# Patient Record
Sex: FEMALE | Race: WHITE | NOT HISPANIC OR LATINO | Employment: FULL TIME | URBAN - NONMETROPOLITAN AREA
[De-identification: names, ages, dates, MRNs, and addresses within clinical notes are randomized per-mention and may not be internally consistent; named-entity substitution may affect disease eponyms.]

---

## 2018-06-29 ENCOUNTER — HISTORICAL (OUTPATIENT)
Dept: ADMINISTRATIVE | Facility: HOSPITAL | Age: 23
End: 2018-06-29

## 2018-11-18 ENCOUNTER — HISTORICAL (OUTPATIENT)
Dept: ADMINISTRATIVE | Facility: HOSPITAL | Age: 23
End: 2018-11-18

## 2021-12-15 LAB
ALBUMIN SERPL-MCNC: 4.2 G/DL (ref 3.4–5)
ALBUMIN/GLOB SERPL: 1.8 {RATIO}
ALP SERPL-CCNC: 72 U/L (ref 50–144)
ALT SERPL-CCNC: 14 U/L (ref 1–45)
ANION GAP SERPL CALC-SCNC: 3 MMOL/L (ref 2–13)
AST SERPL-CCNC: 18 U/L (ref 14–36)
BASOPHILS # BLD AUTO: 0.07 10*3/UL (ref 0.01–0.08)
BASOPHILS NFR BLD AUTO: 1 % (ref 0.1–1.2)
BILIRUB SERPL-MCNC: 0.5 MG/DL (ref 0–1)
BUN SERPL-MCNC: 8 MG/DL (ref 7–20)
CALCIUM SERPL-MCNC: 9.3 MG/DL (ref 8.4–10.2)
CHLORIDE SERPL-SCNC: 104 MMOL/L (ref 94–110)
CHOLEST SERPL-MCNC: 196 MG/DL (ref 0–200)
CO2 SERPL-SCNC: 32 MMOL/L (ref 21–32)
CREAT SERPL-MCNC: 0.75 MG/DL (ref 0.52–1.04)
CREAT/UREA NIT SERPL: 10.7 (ref 12–20)
DEPRECATED CALCIDIOL+CALCIFEROL SERPL-MC: 28 NG/ML (ref 30–100)
EOSINOPHIL # BLD AUTO: 0.15 10*3/UL (ref 0.04–0.36)
EOSINOPHIL NFR BLD AUTO: 2.2 % (ref 0.7–7)
ERYTHROCYTE [DISTWIDTH] IN BLOOD BY AUTOMATED COUNT: 13.5 % (ref 11–14.5)
EST. AVERAGE GLUCOSE BLD GHB EST-MCNC: 99 MG/DL (ref 70–115)
GLOBULIN SER-MCNC: 2.4 G/DL (ref 2–3.9)
GLUCOSE SERPL-MCNC: 87 MGM./DL (ref 70–115)
HBA1C MFR BLD: 5.3 % (ref 4–6)
HCT VFR BLD AUTO: 37.9 % (ref 36–48)
HDLC SERPL-MCNC: 42 MG/DL (ref 40–60)
HGB BLD-MCNC: 12.7 G/DL (ref 11.8–16)
IMM GRANULOCYTES # BLD AUTO: 0.02 10*3/UL (ref 0–0.03)
IMM GRANULOCYTES NFR BLD AUTO: 0.3 % (ref 0–0.5)
LDLC SERPL CALC-MCNC: 92.9 MG/DL (ref 30–100)
LYMPHOCYTES # BLD AUTO: 2 10*3/UL (ref 1.16–3.74)
LYMPHOCYTES NFR BLD AUTO: 29.2 % (ref 20–55)
MCH RBC QN AUTO: 24.9 PG (ref 27–34)
MCHC RBC AUTO-ENTMCNC: 33.5 G/DL (ref 31–37)
MCV RBC AUTO: 74.3 FL (ref 79–99)
MONOCYTES # BLD AUTO: 0.45 10*3/UL (ref 0.24–0.36)
MONOCYTES NFR BLD AUTO: 6.6 % (ref 4.7–12.5)
NEUTROPHILS # BLD AUTO: 4.17 10*3/UL (ref 1.56–6.13)
NEUTROPHILS NFR BLD AUTO: 60.7 % (ref 37–73)
PLATELET # BLD AUTO: 280 10*3/UL (ref 140–371)
PMV BLD AUTO: 13 FL (ref 9.4–12.4)
POTASSIUM SERPL-SCNC: 3.4 MMOL/L (ref 3.5–5.1)
PROT SERPL-MCNC: 6.6 G/DL (ref 6.3–8.2)
RBC # BLD AUTO: 5.1 10*6/UL (ref 4–5.1)
SODIUM SERPL-SCNC: 139 MMOL/L (ref 135–145)
TRIGL SERPL-MCNC: 103 MG/DL (ref 30–200)
TSH SERPL-ACNC: 0.57 UIU/ML (ref 0.36–3.74)
WBC # SPEC AUTO: 6.9 10*3/UL (ref 4–11.5)

## 2022-01-11 LAB
INFLUENZA A ANTIGEN, POC: NEGATIVE
INFLUENZA B ANTIGEN, POC: NEGATIVE
RAPID GROUP A STREP (OHS): NEGATIVE

## 2022-01-18 ENCOUNTER — HISTORICAL (OUTPATIENT)
Dept: ADMINISTRATIVE | Facility: HOSPITAL | Age: 27
End: 2022-01-18

## 2022-04-10 ENCOUNTER — HISTORICAL (OUTPATIENT)
Dept: ADMINISTRATIVE | Facility: HOSPITAL | Age: 27
End: 2022-04-10

## 2022-04-26 VITALS
WEIGHT: 263.88 LBS | HEIGHT: 62 IN | SYSTOLIC BLOOD PRESSURE: 122 MMHG | DIASTOLIC BLOOD PRESSURE: 98 MMHG | BODY MASS INDEX: 48.56 KG/M2 | OXYGEN SATURATION: 98 %

## 2022-05-04 ENCOUNTER — HISTORICAL (OUTPATIENT)
Dept: ADMINISTRATIVE | Facility: HOSPITAL | Age: 27
End: 2022-05-04

## 2022-09-21 ENCOUNTER — HISTORICAL (OUTPATIENT)
Dept: ADMINISTRATIVE | Facility: HOSPITAL | Age: 27
End: 2022-09-21

## 2022-11-17 DIAGNOSIS — G43.909 MIGRAINE WITHOUT STATUS MIGRAINOSUS, NOT INTRACTABLE, UNSPECIFIED MIGRAINE TYPE: Primary | ICD-10-CM

## 2022-12-06 ENCOUNTER — HISTORICAL (OUTPATIENT)
Dept: ADMINISTRATIVE | Facility: HOSPITAL | Age: 27
End: 2022-12-06

## 2023-01-18 PROBLEM — R20.0 BILATERAL HAND NUMBNESS: Status: ACTIVE | Noted: 2023-01-18

## 2023-01-18 PROBLEM — M54.50 CHRONIC BILATERAL LOW BACK PAIN WITHOUT SCIATICA: Status: ACTIVE | Noted: 2023-01-18

## 2023-01-18 PROBLEM — M54.2 NECK PAIN: Status: ACTIVE | Noted: 2023-01-18

## 2023-01-18 PROBLEM — G44.59 OTHER COMPLICATED HEADACHE SYNDROME: Status: ACTIVE | Noted: 2023-01-18

## 2023-01-18 PROBLEM — G89.29 CHRONIC BILATERAL LOW BACK PAIN WITHOUT SCIATICA: Status: ACTIVE | Noted: 2023-01-18

## 2023-01-18 PROBLEM — E66.01 MORBID OBESITY WITH BMI OF 45.0-49.9, ADULT: Status: ACTIVE | Noted: 2023-01-18

## 2023-01-23 RX ORDER — LISINOPRIL 30 MG/1
30 TABLET ORAL
COMMUNITY
Start: 2022-12-20 | End: 2023-01-23 | Stop reason: SDUPTHER

## 2023-01-23 RX ORDER — CITALOPRAM 40 MG/1
40 TABLET, FILM COATED ORAL
COMMUNITY
Start: 2022-12-20 | End: 2023-01-23 | Stop reason: SDUPTHER

## 2023-01-23 NOTE — TELEPHONE ENCOUNTER
Pt called back and said that she took her last Celexa and Lisinopril yesterday.    Please advise on Ambien. Pt was last seen 11/23/22 and was supposed to f/u in 1mo on Migraines after starting Aimovig. No future appt scheduled.     ----- Message from Freda Brown MA sent at 1/23/2023 11:47 AM CST -----  Regarding: Refills  Ambein 5mg pt requested to go up on the dose. She is having to take other meds with her ambein.  Lisinapril 30mg  Celexia 40mg    [phone]  Send to [facility]

## 2023-01-24 DIAGNOSIS — G47.00 INSOMNIA, UNSPECIFIED TYPE: Primary | ICD-10-CM

## 2023-01-24 RX ORDER — LISINOPRIL 30 MG/1
30 TABLET ORAL DAILY
Qty: 30 TABLET | Refills: 1 | Status: SHIPPED | OUTPATIENT
Start: 2023-01-24 | End: 2023-03-22 | Stop reason: SDUPTHER

## 2023-01-24 RX ORDER — CITALOPRAM 40 MG/1
40 TABLET, FILM COATED ORAL DAILY
Qty: 30 TABLET | Refills: 1 | Status: SHIPPED | OUTPATIENT
Start: 2023-01-24 | End: 2023-03-22 | Stop reason: SDUPTHER

## 2023-01-24 RX ORDER — ZOLPIDEM TARTRATE 10 MG/1
10 TABLET ORAL NIGHTLY PRN
Qty: 30 TABLET | Refills: 0 | Status: SHIPPED | OUTPATIENT
Start: 2023-01-24 | End: 2023-02-23 | Stop reason: SDUPTHER

## 2023-01-24 RX ORDER — ZOLPIDEM TARTRATE 10 MG/1
TABLET ORAL
COMMUNITY
Start: 2022-11-09 | End: 2023-01-24 | Stop reason: SDUPTHER

## 2023-01-25 NOTE — TELEPHONE ENCOUNTER
I called pt and notified her. She verbalized understanding. I told her that our office would call her to schedule her f/u appt.

## 2023-02-23 DIAGNOSIS — G43.909 MIGRAINE WITHOUT STATUS MIGRAINOSUS, NOT INTRACTABLE, UNSPECIFIED MIGRAINE TYPE: Primary | ICD-10-CM

## 2023-02-23 DIAGNOSIS — G47.00 INSOMNIA, UNSPECIFIED TYPE: ICD-10-CM

## 2023-02-23 RX ORDER — ZOLPIDEM TARTRATE 10 MG/1
10 TABLET ORAL NIGHTLY PRN
Qty: 30 TABLET | Refills: 2 | Status: SHIPPED | OUTPATIENT
Start: 2023-02-23 | End: 2023-06-10 | Stop reason: SDUPTHER

## 2023-02-23 RX ORDER — UBROGEPANT 100 MG/1
100 TABLET ORAL DAILY PRN
Qty: 20 TABLET | Refills: 5 | Status: SHIPPED | OUTPATIENT
Start: 2023-02-23 | End: 2023-05-02 | Stop reason: SDUPTHER

## 2023-02-23 RX ORDER — UBROGEPANT 100 MG/1
1 TABLET ORAL DAILY PRN
COMMUNITY
Start: 2023-01-09 | End: 2023-02-23 | Stop reason: SDUPTHER

## 2023-03-22 DIAGNOSIS — M54.2 NECK PAIN: ICD-10-CM

## 2023-03-22 DIAGNOSIS — I10 HYPERTENSION, UNSPECIFIED TYPE: ICD-10-CM

## 2023-03-22 DIAGNOSIS — M62.838 MUSCLE SPASMS OF NECK: ICD-10-CM

## 2023-03-22 DIAGNOSIS — F32.A DEPRESSION, UNSPECIFIED DEPRESSION TYPE: ICD-10-CM

## 2023-03-23 RX ORDER — CYCLOBENZAPRINE HCL 10 MG
10 TABLET ORAL 2 TIMES DAILY PRN
COMMUNITY
Start: 2023-02-22 | End: 2023-03-23 | Stop reason: SDUPTHER

## 2023-03-23 RX ORDER — CYCLOBENZAPRINE HCL 10 MG
10 TABLET ORAL 2 TIMES DAILY PRN
Qty: 60 TABLET | Refills: 5 | Status: SHIPPED | OUTPATIENT
Start: 2023-03-23 | End: 2023-05-25

## 2023-03-23 RX ORDER — LISINOPRIL 30 MG/1
30 TABLET ORAL DAILY
Qty: 30 TABLET | Refills: 11 | Status: SHIPPED | OUTPATIENT
Start: 2023-03-23 | End: 2023-06-10 | Stop reason: SDUPTHER

## 2023-03-23 RX ORDER — CITALOPRAM 40 MG/1
40 TABLET, FILM COATED ORAL DAILY
Qty: 30 TABLET | Refills: 11 | Status: SHIPPED | OUTPATIENT
Start: 2023-03-23 | End: 2023-06-10 | Stop reason: SDUPTHER

## 2023-03-23 NOTE — TELEPHONE ENCOUNTER
----- Message from Abby Godoy sent at 3/23/2023  1:15 PM CDT -----  Regarding: refill  Cyclobenzaprine        [facility]        [phone]

## 2023-05-02 ENCOUNTER — TELEPHONE (OUTPATIENT)
Dept: FAMILY MEDICINE | Facility: CLINIC | Age: 28
End: 2023-05-02
Payer: MEDICAID

## 2023-05-02 DIAGNOSIS — G43.909 MIGRAINE WITHOUT STATUS MIGRAINOSUS, NOT INTRACTABLE, UNSPECIFIED MIGRAINE TYPE: ICD-10-CM

## 2023-05-02 RX ORDER — ERENUMAB-AOOE 70 MG/ML
1 INJECTION SUBCUTANEOUS
COMMUNITY
Start: 2023-02-13 | End: 2023-05-02 | Stop reason: SDUPTHER

## 2023-05-02 RX ORDER — UBROGEPANT 100 MG/1
100 TABLET ORAL DAILY PRN
Qty: 20 TABLET | Refills: 5 | Status: SHIPPED | OUTPATIENT
Start: 2023-05-02 | End: 2023-06-10 | Stop reason: SDUPTHER

## 2023-05-02 RX ORDER — ERENUMAB-AOOE 70 MG/ML
70 INJECTION SUBCUTANEOUS
Qty: 1 ML | Refills: 5 | Status: SHIPPED | OUTPATIENT
Start: 2023-05-02 | End: 2023-06-10 | Stop reason: SDUPTHER

## 2023-05-02 NOTE — TELEPHONE ENCOUNTER
----- Message from Bassam Madden sent at 5/2/2023  9:02 AM CDT -----  Regarding: refills  Aimovig, ubrelvy,     [facility]    [phone]

## 2023-05-24 NOTE — PROGRESS NOTES
[facility] Initial Office Visit Note    Initial Visit Date: 5/25/2023  Current Visit Date:  05/25/2023    Chief Complaint:     Chief Complaint   Patient presents with    Patient presents today with a hx of headaches and migraines     Patient states her migraines can come frequently at least 3 times a week but there has been times when her migraines has lasted 7-8 days straight. Patient mentioned she did see neurosurgery a few months ago and they stated surgery wouldn't be mindful and they should see a neurologist. Patient states her medication didn't cause a big change but did help somewhat.       History of Present Illness:      This is 27 y.o. female with history of morbid obesity, Chiari malformation type 1, anxiety, PCOS, insomnia, hypertension who is referred headache disorder.    Age of Onset : 10 years old    Headache Description:   occipital, paracervical, stabbing, throbbing, pressure sensation, moderate to severe, lasting >24 hours to 7 days, with nausea, with photophobia and phonophobia.   Left cheek, lower jaw, gum numbness and occasional periorbital throbbing pain.  Also reports jaw clicking sensation at times.    Frequency: 15 migraine headache days per month for the past 3 years.     Provocation Factors: denied    Risk Factors  - Family history of headache disorder: Yes mom and sister with headaches. Maternal grandfather with headache disorder.   - History of focal CNS lesions: No  - History of CNS infections: No  - History head trauma: No  - History of underlying mood disorder: Yes mood swings.   - History of sleep disorder: Yes not well.  Severe bruxism.  Tends to by through mouth guards when she was young.  Family history of bruxism.   - Recreational drug use: No  - Tobacco use: No  - Alcohol use: No  - Weight fluctuation: Yes morbid obesity.  - Isotretinoin or Tetracycline use:  No  - Family planning and contraceptive use: No    Medications:     Current Prophylactic  Erenumab 70 mg once per  month (12/12/2022 to present)  Lisinopril 30 mg daily  Celexa 40 mg daily  Gabapentin 300 mg 3 times a day (12/20/2022 to present)    Current Abortive  Ubrogepant 100 mg twice a day as needed (12/12/2022 to present): effective.   Cyclobenzaprine   Aleve PRN: daily for the past few weeks   Tylenol PRN: one day per week  Advil PRN: one day     Prior Prophylactic  Acetazolamide  Topiramate (1/2022): nausea   Amitriptyline (2/2022): worsening depression   Emgality 120 mg once per month: ineffective    Prior Abortive  Denied     Devices:     - VNS:  - TNS  - TMS:     Procedures:     - Botox:  - PSG block:   - Occipital nerve block:     Labs:     Results for orders placed or performed in visit on 09/21/22   POCT rapid strep A   Result Value Ref Range    Rapid Group A Strep Negative    POCT Influenza A/B Molecular   Result Value Ref Range    Inflenza A Ag Negative     Influenza B Ag Negative        Studies:     - MRI Brain CSF flow 2/22/2023:  I have reviewed the study independently and with the patient.  Patent flow.  - MRI Brain without contrast 12/6/2022:  I have reviewed the study independently and with the patient. 4 mm tonsillar ectopia.  No crowding.  - MRA Head w/o Juan Luis:   - MRV Head w/o Juan Luis:   - NCHCT:  - Lumbar Puncture:    Review of Systems:     Review of Systems   All other systems reviewed and are negative.    Physical Exams:     Vitals:    05/25/23 0907   BP: 130/89   Pulse: 87   Temp: 97.7 °F (36.5 °C)       Physical Exam  Vitals and nursing note reviewed.   Constitutional:       Appearance: Normal appearance.   HENT:      Head: Normocephalic and atraumatic.      Comments: Bilateral masseter tenderness to palpation.  Mild TMJ subluxation on the left.     Nose: Nose normal.      Mouth/Throat:      Mouth: Mucous membranes are moist.      Pharynx: Oropharynx is clear.   Eyes:      Conjunctiva/sclera: Conjunctivae normal.   Cardiovascular:      Rate and Rhythm: Normal rate and regular rhythm.      Pulses: Normal  pulses.   Pulmonary:      Effort: Pulmonary effort is normal.      Breath sounds: Normal breath sounds.   Abdominal:      General: Abdomen is flat.   Musculoskeletal:         General: Normal range of motion.      Cervical back: Normal range of motion.   Skin:     General: Skin is warm.   Neurological:      Mental Status: She is alert.       Comprehensive Neurological Exam:  Mental Status: Alert Oriented to Self, Date, and Place. Comprehension wnl. No dysarthria.   CN II - XII: GOMEZ, No APD, Fundus wnl OU, VFFC, No ptosis OU, EOMI without nystagmus LT/Temp symmetric in CN V1-3 distribution, Hearing grossly intact, Face Symmetric, Tongue and Uvula midline, Trapezius symmetric bilateral.   Motor: tone and bulk wnl throughout, no abnormal involuntary or voluntary movements, 5/5 to confrontation, Fine finger movements wnl b/l, No pronator drift.   Sensory: LT, Proprioception, Vibration, PP, Temp symmetric.   Reflexes: 2+ throughout, plantar reflexes downward bilateral.   Cerebellar: FNF wnl b/l, RAHM wnl b/l  Romberg: Negative  Gait: normal. Heel Gait, Toe Gait, Tandem Gait wnl.     Assessment:     This is 27 y.o. female with history of morbid obesity, Chiari malformation type 1, anxiety, PCOS, insomnia, hypertension who is referred for chronic migraine without aura with bruxism and left stylohyaloid tendonitis.    Problem List Items Addressed This Visit          Neuro    Chronic migraine without aura without status migrainosus, not intractable    Relevant Medications    baclofen (LIORESAL) 10 MG tablet    onabotulinumtoxina injection 200 Units (Start on 8/25/2023 12:00 AM)       ENT    Bruxism - Primary    Relevant Medications    baclofen (LIORESAL) 10 MG tablet    onabotulinumtoxina injection 200 Units (Start on 8/25/2023 12:00 AM)     Other Visit Diagnoses       Stylohyoid tendonitis                Plan:     [] Continue with Erenumab 70 mg once per month for now.  [] Continue with Gabapentin 300 mg 3 times a day   []  Continue with Ubrogepant 100 mg twice a day as needed - triptans are contraindicated in patient with severe hypertension.  [] start Baclofen 10 mg at bedtime with BID PRN  [] Patient advised to wear mouth guard if possible.  [] Discontinue cyclobenzaprine.  [] Migraine Botox Protocol:  A. : Botox dosage: 10 units in 2 sites Right: 5 Left: 5   B. Procerus Botox dosage: 5 Units in 1 site   C. Frontalis Botox dosage: 20 Units divided in 4 sites Right: 10 Left: 10   D. Temporalis Botox dosage: 40 Units divided in 8 sites Right: 20 Left: 20   E. Occipitalis Botox dosage 30 Units divided in 6 sites Right: 15 Left: 15   F. Cervical Paraspinal Botox dosage: 20 Units divided in 4 sites: Right 10 Left: 10   G. Trapezius Botox dosage: 30 Units divided in 6 sites: Right: 15 Left: 15     Total Units Injected: 155 units   Total Units discarded: 45 units       RTC Botox    Headache education provided: good sleep hygiene and 7 hours of sleep per night, stress management, medication overuse education provided. Using more 3 OTC per week may worsen headaches, high intensity interval training has shown to reduce headache frequency. Low carb, high protein has shown to reduce headache frequency. Patient is instructed in keep headache diary.     I have explained the treatment plan, diagnosis, and prognosis to patient. All questions are answered to the best of my knowledge.     Face to face time 60 minutes, including documentation, chart review, counseling, education, review of test results, relevant medical records, and coordination of care.       Mary Fried MD   General Neurology  05/25/2023

## 2023-05-25 ENCOUNTER — OFFICE VISIT (OUTPATIENT)
Dept: NEUROLOGY | Facility: CLINIC | Age: 28
End: 2023-05-25
Payer: MEDICAID

## 2023-05-25 VITALS
HEIGHT: 62 IN | BODY MASS INDEX: 49.69 KG/M2 | SYSTOLIC BLOOD PRESSURE: 130 MMHG | WEIGHT: 270 LBS | HEART RATE: 87 BPM | OXYGEN SATURATION: 98 % | DIASTOLIC BLOOD PRESSURE: 89 MMHG | TEMPERATURE: 98 F

## 2023-05-25 DIAGNOSIS — F45.8 BRUXISM: ICD-10-CM

## 2023-05-25 DIAGNOSIS — I10 PRIMARY HYPERTENSION: Chronic | ICD-10-CM

## 2023-05-25 DIAGNOSIS — G43.711 INTRACTABLE CHRONIC MIGRAINE WITHOUT AURA AND WITH STATUS MIGRAINOSUS: Primary | ICD-10-CM

## 2023-05-25 DIAGNOSIS — M77.9 STYLOHYOID TENDONITIS: ICD-10-CM

## 2023-05-25 PROBLEM — G43.709 CHRONIC MIGRAINE WITHOUT AURA WITHOUT STATUS MIGRAINOSUS, NOT INTRACTABLE: Status: ACTIVE | Noted: 2023-05-25

## 2023-05-25 PROCEDURE — 4010F PR ACE/ARB THEARPY RXD/TAKEN: ICD-10-PCS | Mod: CPTII,,, | Performed by: PSYCHIATRY & NEUROLOGY

## 2023-05-25 PROCEDURE — 4010F ACE/ARB THERAPY RXD/TAKEN: CPT | Mod: CPTII,,, | Performed by: PSYCHIATRY & NEUROLOGY

## 2023-05-25 PROCEDURE — 3075F SYST BP GE 130 - 139MM HG: CPT | Mod: CPTII,,, | Performed by: PSYCHIATRY & NEUROLOGY

## 2023-05-25 PROCEDURE — 99205 PR OFFICE/OUTPT VISIT, NEW, LEVL V, 60-74 MIN: ICD-10-PCS | Mod: S$PBB,,, | Performed by: PSYCHIATRY & NEUROLOGY

## 2023-05-25 PROCEDURE — 3008F PR BODY MASS INDEX (BMI) DOCUMENTED: ICD-10-PCS | Mod: CPTII,,, | Performed by: PSYCHIATRY & NEUROLOGY

## 2023-05-25 PROCEDURE — 1159F PR MEDICATION LIST DOCUMENTED IN MEDICAL RECORD: ICD-10-PCS | Mod: CPTII,,, | Performed by: PSYCHIATRY & NEUROLOGY

## 2023-05-25 PROCEDURE — 3075F PR MOST RECENT SYSTOLIC BLOOD PRESS GE 130-139MM HG: ICD-10-PCS | Mod: CPTII,,, | Performed by: PSYCHIATRY & NEUROLOGY

## 2023-05-25 PROCEDURE — 3008F BODY MASS INDEX DOCD: CPT | Mod: CPTII,,, | Performed by: PSYCHIATRY & NEUROLOGY

## 2023-05-25 PROCEDURE — 99214 OFFICE O/P EST MOD 30 MIN: CPT | Mod: PBBFAC | Performed by: PSYCHIATRY & NEUROLOGY

## 2023-05-25 PROCEDURE — 3079F DIAST BP 80-89 MM HG: CPT | Mod: CPTII,,, | Performed by: PSYCHIATRY & NEUROLOGY

## 2023-05-25 PROCEDURE — 1159F MED LIST DOCD IN RCRD: CPT | Mod: CPTII,,, | Performed by: PSYCHIATRY & NEUROLOGY

## 2023-05-25 PROCEDURE — 99205 OFFICE O/P NEW HI 60 MIN: CPT | Mod: S$PBB,,, | Performed by: PSYCHIATRY & NEUROLOGY

## 2023-05-25 PROCEDURE — 3079F PR MOST RECENT DIASTOLIC BLOOD PRESSURE 80-89 MM HG: ICD-10-PCS | Mod: CPTII,,, | Performed by: PSYCHIATRY & NEUROLOGY

## 2023-05-25 RX ORDER — ERGOCALCIFEROL 1.25 MG/1
50000 CAPSULE ORAL
COMMUNITY
Start: 2022-12-01 | End: 2023-12-01

## 2023-05-25 RX ORDER — CLONIDINE HYDROCHLORIDE 0.3 MG/1
0.3 TABLET ORAL NIGHTLY
COMMUNITY
Start: 2023-05-04 | End: 2023-06-09 | Stop reason: SDUPTHER

## 2023-05-25 RX ORDER — GABAPENTIN 300 MG/1
300 CAPSULE ORAL 3 TIMES DAILY
COMMUNITY
Start: 2023-05-13 | End: 2023-09-05 | Stop reason: SDUPTHER

## 2023-05-25 RX ORDER — HYDROCHLOROTHIAZIDE 12.5 MG/1
12.5 TABLET ORAL DAILY
COMMUNITY
Start: 2022-02-08

## 2023-05-25 RX ORDER — NAPROXEN SODIUM 220 MG
220 TABLET ORAL DAILY PRN
COMMUNITY

## 2023-05-25 RX ORDER — BACLOFEN 10 MG/1
TABLET ORAL
Qty: 90 TABLET | Refills: 4 | Status: SHIPPED | OUTPATIENT
Start: 2023-05-25 | End: 2023-06-01

## 2023-06-01 ENCOUNTER — TELEPHONE (OUTPATIENT)
Dept: NEUROLOGY | Facility: CLINIC | Age: 28
End: 2023-06-01
Payer: MEDICAID

## 2023-06-01 DIAGNOSIS — M77.9 STYLOHYOID TENDONITIS: Primary | ICD-10-CM

## 2023-06-01 RX ORDER — TIZANIDINE 2 MG/1
2 TABLET ORAL NIGHTLY
Qty: 30 TABLET | Refills: 4 | Status: SHIPPED | OUTPATIENT
Start: 2023-06-01 | End: 2023-07-13

## 2023-06-01 NOTE — TELEPHONE ENCOUNTER
Patient was last seen on 05/25/2023. She complains that the medication is not helping her with her muscle spasms at all, she also states that she has been dealing with a lot of nausea. Patient is requesting for a new medication for her muscle pain.

## 2023-06-01 NOTE — TELEPHONE ENCOUNTER
----- Message from Anabelle Springer sent at 6/1/2023  2:57 PM CDT -----  Regarding: Medcation Issues  Patient called and stated she was prescribed this medication baclofen (LIORESAL) 10 MG tablet and has been taking for a total of one week.   Patient complains about dizziness, fatigue, headaches and nausea , with nausea being the worst of the symptoms.   Patient would like to speak to a nurse regarding this medication and can be reached at [phone]    Please Advise     Thank You

## 2023-06-09 ENCOUNTER — TELEPHONE (OUTPATIENT)
Dept: FAMILY MEDICINE | Facility: CLINIC | Age: 28
End: 2023-06-09
Payer: MEDICAID

## 2023-06-09 DIAGNOSIS — I10 PRIMARY HYPERTENSION: Primary | Chronic | ICD-10-CM

## 2023-06-09 RX ORDER — CLONIDINE HYDROCHLORIDE 0.3 MG/1
0.3 TABLET ORAL NIGHTLY
Qty: 30 TABLET | Refills: 11 | Status: SHIPPED | OUTPATIENT
Start: 2023-06-09 | End: 2023-12-12 | Stop reason: SDUPTHER

## 2023-06-10 DIAGNOSIS — F32.A DEPRESSION, UNSPECIFIED DEPRESSION TYPE: ICD-10-CM

## 2023-06-10 DIAGNOSIS — G43.909 MIGRAINE WITHOUT STATUS MIGRAINOSUS, NOT INTRACTABLE, UNSPECIFIED MIGRAINE TYPE: ICD-10-CM

## 2023-06-10 DIAGNOSIS — I10 HYPERTENSION, UNSPECIFIED TYPE: ICD-10-CM

## 2023-06-10 DIAGNOSIS — G47.00 INSOMNIA, UNSPECIFIED TYPE: ICD-10-CM

## 2023-06-12 RX ORDER — UBROGEPANT 100 MG/1
100 TABLET ORAL DAILY PRN
Qty: 20 TABLET | Refills: 5 | Status: SHIPPED | OUTPATIENT
Start: 2023-06-12 | End: 2023-12-12 | Stop reason: SDUPTHER

## 2023-06-12 RX ORDER — CITALOPRAM 40 MG/1
40 TABLET, FILM COATED ORAL DAILY
Qty: 30 TABLET | Refills: 11 | Status: SHIPPED | OUTPATIENT
Start: 2023-06-12

## 2023-06-12 RX ORDER — ERENUMAB-AOOE 70 MG/ML
70 INJECTION SUBCUTANEOUS
Qty: 1 ML | Refills: 5 | Status: SHIPPED | OUTPATIENT
Start: 2023-06-12 | End: 2023-07-13

## 2023-06-12 RX ORDER — ZOLPIDEM TARTRATE 10 MG/1
10 TABLET ORAL NIGHTLY PRN
Qty: 30 TABLET | Refills: 2 | Status: SHIPPED | OUTPATIENT
Start: 2023-06-12 | End: 2023-09-05 | Stop reason: SDUPTHER

## 2023-06-12 RX ORDER — LISINOPRIL 30 MG/1
30 TABLET ORAL DAILY
Qty: 30 TABLET | Refills: 11 | Status: SHIPPED | OUTPATIENT
Start: 2023-06-12

## 2023-06-13 NOTE — PROGRESS NOTES
Chief Complaint:  Annual Exam     History of Present Illness:  Princess Colindres is a 27 y.o. year old  with PMH of PCOS presents for her well woman. Currently using nothing for birth control, abstinent.     C/o sporadic bleeding. States she's had 4-6 cycles within the last 5 years. +monthly cramping as if she's having a cycle every month.      LMP: 23  Frequency:irregular   Cycle Length: 2 days   Flow: light  Intermenstrual Bleeding: No  Postcoital Bleeding: No  Dysmenorrhea: Yes  Sexually Active: Not Currently   Dyspareunia: No  Contraception: Abstinence   H/o STI: No   Last pap: 2018  H/o Abnormal Pap: No   Colposcopy: NA  Gardasil: 0   MMG: n/a      Review of Systems:  General/Constitutional: Chills denies. Fatigue/weakness denies. Fever denies. Night sweats denies. Hot flashes denies    Respiratory: Cough denies. Hemoptysis denies. SOB denies. Sputum production denies. Wheezing denies .   Cardiovascular: Chest pain denies . Dizziness denies. Palpitations denies. Swelling in hands/feet denies    Gastrointestinal: Abdominal pain denies. Blood in stool denies. Constipation denies. Diarrhea denies. Heartburn denies. Nausea denies. Vomiting denies    Genitourinary: Incontinence denies. Blood in urine denies. Frequent urination denies. Painful urination denies. Urinary urgency denies. Nocturia denies    Gynecologic: Irregular menses admits. Heavy bleeding denies. Painful menses denies. Vaginal discharge denies. Vaginal odor denies. Vaginal itching denies. Vaginal lesion denies. Pelvic pain denies. Decreased libido denies. Vulvar lesion denies. Prolapse of genital organs denies. Painful intercourse denies. Postcoital bleeding denies    Psychiatric: Depression denies. Anxiety denies     OB History    Para Term  AB Living   1 1 1 0 0 1   SAB IAB Ectopic Multiple Live Births   0 0 0 0 1      # 1 - Date: 18, Sex: Male, Weight: 3.147 kg (6 lb 15 oz), GA: 38w0d, Delivery: , Low  Transverse, Apgar1: None, Apgar5: None, Living: Living, Birth Comments: None      Past Medical History:   Diagnosis Date    Acute angle-closure glaucoma     bilateral    Asthma     Cervical radiculopathy     Family history of diabetes mellitus     Fibromyalgia     BRITNEY (generalized anxiety disorder)     Hypertension     Insomnia     Migraine headache     Ovarian cyst     PCOS (polycystic ovarian syndrome)        Current Outpatient Medications:     AIMOVIG AUTOINJECTOR 70 mg/mL autoinjector, Inject 1 mL (70 mg total) into the skin every 28 days., Disp: 1 mL, Rfl: 5    citalopram (CELEXA) 40 MG tablet, Take 1 tablet (40 mg total) by mouth once daily., Disp: 30 tablet, Rfl: 11    cloNIDine (CATAPRES) 0.3 MG tablet, Take 1 tablet (0.3 mg total) by mouth every evening., Disp: 30 tablet, Rfl: 11    ergocalciferol (ERGOCALCIFEROL) 50,000 unit Cap, Take 50,000 Units by mouth., Disp: , Rfl:     gabapentin (NEURONTIN) 300 MG capsule, Take 300 mg by mouth 3 (three) times daily., Disp: , Rfl:     hydroCHLOROthiazide (HYDRODIURIL) 12.5 MG Tab, Take 12.5 mg by mouth., Disp: , Rfl:     lisinopriL (PRINIVIL,ZESTRIL) 30 MG tablet, Take 1 tablet (30 mg total) by mouth once daily., Disp: 30 tablet, Rfl: 11    UBRELVY 100 mg tablet, Take 1 tablet (100 mg total) by mouth daily as needed for Migraine., Disp: 20 tablet, Rfl: 5    zolpidem (AMBIEN) 10 mg Tab, Take 1 tablet (10 mg total) by mouth nightly as needed (Insomnia)., Disp: 30 tablet, Rfl: 2    naproxen sodium (ANAPROX) 220 MG tablet, Take 220 mg by mouth daily as needed., Disp: , Rfl:     tiZANidine (ZANAFLEX) 2 MG tablet, Take 1 tablet (2 mg total) by mouth every evening. (Patient not taking: Reported on 6/14/2023), Disp: 30 tablet, Rfl: 4    Current Facility-Administered Medications:     [START ON 8/23/2023] onabotulinumtoxina injection 200 Units, 200 Units, Intramuscular, 1 time in Clinic/HOD, Mary W Fried, MD    Review of patient's allergies indicates:   Allergen Reactions     "Codeine Anxiety, Itching, Nausea Only, Rash and Shortness Of Breath     Other reaction(s): Other (See Comments)  headaches       Past Surgical History:   Procedure Laterality Date     SECTION  2018     Family History   Problem Relation Age of Onset    Melanoma Maternal Grandmother     Breast cancer Neg Hx     Cervical cancer Neg Hx     Uterine cancer Neg Hx     Colon cancer Neg Hx      Social History     Socioeconomic History    Marital status: Single   Tobacco Use    Smoking status: Never    Smokeless tobacco: Never   Substance and Sexual Activity    Alcohol use: Never    Drug use: Never    Sexual activity: Not Currently     Partners: Male     Birth control/protection: None       Physical Exam:  /78   Ht 5' 2" (1.575 m)   Wt 121.2 kg (267 lb 3.2 oz)   LMP 2023 (Exact Date) Comment: irregular, 4 cycles in 5 years.  BMI 48.87 kg/m²     Chaperone: present.     General appearance: healthy, well-nourished and well-developed     Psychiatric: Orientation to time, place and person. Normal mood and affect and active, alert     Skin: Appearance: no rashes or lesions.     Neck:   Neck: supple, FROM, trachea midline. and no masses   Thyroid: no enlargement or nodules and non-tender.       Cardiovascular:   Auscultation: RRR and no murmur.   Peripheral Vascular: no varicosities, LLE edema, RLE edema, calf tenderness, and palpable cord and pedal pulses intact.     Lungs:   Respiratory effort: no intercostal retractions or accessory muscle usage.   Auscultation: no wheezing, rales/crackles, or rhonchi and clear to auscultation.     Breast:   Inspection/Palpation: no tenderness, discrete/distinct masses, skin changes, or abnormal secretions. Nipple appearance normal.     Abdomen:   Auscultation/Inspection/Palpation: no hepatomegaly, splenomegaly, masses, tenderness or CVA tenderness and soft, non-distended bowel sounds preset.    Hernia: no palpable hernias.     Female Genitalia:    Vulva: no masses, " tenderness or lesions    Bladder/Urethra: no urethral discharge or mass, normal meatus, bladder non-distended.    Vagina: no tenderness, erythema, cystocele, rectocele, abnormal vaginal discharge or vesicle(s) or ulcers    Cervix: no discharge, no cervical lacerations noted or motion tenderness and grossly normal    Uterus: normal size and shape and midline, non-tender, and no uterine prolapse.    Adnexa/Parametria: no parametrial tenderness or mass, no adnexal tenderness or ovarian mass.     Lymph Nodes:   Palpation: non tender submandibular nodes, axillary nodes, or inguinal nodes.     Rectal Exam:   Rectum: normal perianal skin.       Assessment/Plan:  1. Well woman exam  Pap  Advised patient if she notices any changes to her breasts, including a lump, mass, dimpling, discharge, rash or tenderness, to should contact us immediately   Healthy diet, exercise   Multivitamin   Seat belt   Sunscreen use   Safe sex/ STI education   Contraception evaluation: nothing, declines   Gardasil evaluation: 1/3    2. BMI 45.0-49.9, adult  Morbid Obesity: discouraged pregnancy at this time due to obesity. Patient educated on risks and complications of obesity and pregnancy including but not limited to first trimester miscarriage recurrent miscarriages, congenital anomalies, HTN disorders, gestational diabetes, macrosomia, PTL&D,  higher risk of fetal demise in-utero and higher risk of CS (and wound complication including infection breakdown) with obesity.     Weight loss with diet and exercise modifications    3. Oligomenorrhea, PCOS (polycystic ovarian syndrome)  Educated Polycystic ovary syndrome (PCOS) is characterized clinically by oligomenorrhea and hyperandrogenism, as well as the frequent presence of associated risk factors for cardiovascular disease, including obesity, glucose intolerance, dyslipidemia, and obstructive sleep apnea.     Labs: prolactin, estradiol, DHEA, FSH, FT4, TSH, testosterone, lipid panel, a1c,  glucose    4. Contraception management   Discussed with patient contraceptive options including natural family planning, barrier, oral contraceptives, patch, NuvaRing, Depo-Provera, Nexplanon, IUDs, abstinence.       Safe sex education       Discussed with patient that birth control options discussed above do not protect against STDs.     Patient declines

## 2023-06-14 ENCOUNTER — OFFICE VISIT (OUTPATIENT)
Dept: OBSTETRICS AND GYNECOLOGY | Facility: CLINIC | Age: 28
End: 2023-06-14
Payer: MEDICAID

## 2023-06-14 VITALS
HEIGHT: 62 IN | BODY MASS INDEX: 49.17 KG/M2 | WEIGHT: 267.19 LBS | DIASTOLIC BLOOD PRESSURE: 78 MMHG | SYSTOLIC BLOOD PRESSURE: 126 MMHG

## 2023-06-14 DIAGNOSIS — E28.2 PCOS (POLYCYSTIC OVARIAN SYNDROME): ICD-10-CM

## 2023-06-14 DIAGNOSIS — Z01.419 WELL WOMAN EXAM: ICD-10-CM

## 2023-06-14 DIAGNOSIS — N91.5 OLIGOMENORRHEA, UNSPECIFIED TYPE: ICD-10-CM

## 2023-06-14 DIAGNOSIS — Z12.4 SCREENING FOR MALIGNANT NEOPLASM OF THE CERVIX: Primary | ICD-10-CM

## 2023-06-14 LAB
B-HCG UR QL: NEGATIVE
CTP QC/QA: YES

## 2023-06-14 PROCEDURE — 3078F PR MOST RECENT DIASTOLIC BLOOD PRESSURE < 80 MM HG: ICD-10-PCS | Mod: CPTII,,, | Performed by: OBSTETRICS & GYNECOLOGY

## 2023-06-14 PROCEDURE — 4010F ACE/ARB THERAPY RXD/TAKEN: CPT | Mod: CPTII,,, | Performed by: OBSTETRICS & GYNECOLOGY

## 2023-06-14 PROCEDURE — 81025 URINE PREGNANCY TEST: CPT | Mod: ,,, | Performed by: OBSTETRICS & GYNECOLOGY

## 2023-06-14 PROCEDURE — 99385 PR PREVENTIVE VISIT,NEW,18-39: ICD-10-PCS | Mod: ,,, | Performed by: OBSTETRICS & GYNECOLOGY

## 2023-06-14 PROCEDURE — 4010F PR ACE/ARB THEARPY RXD/TAKEN: ICD-10-PCS | Mod: CPTII,,, | Performed by: OBSTETRICS & GYNECOLOGY

## 2023-06-14 PROCEDURE — 1159F PR MEDICATION LIST DOCUMENTED IN MEDICAL RECORD: ICD-10-PCS | Mod: CPTII,,, | Performed by: OBSTETRICS & GYNECOLOGY

## 2023-06-14 PROCEDURE — 3074F PR MOST RECENT SYSTOLIC BLOOD PRESSURE < 130 MM HG: ICD-10-PCS | Mod: CPTII,,, | Performed by: OBSTETRICS & GYNECOLOGY

## 2023-06-14 PROCEDURE — 99385 PREV VISIT NEW AGE 18-39: CPT | Mod: ,,, | Performed by: OBSTETRICS & GYNECOLOGY

## 2023-06-14 PROCEDURE — 3078F DIAST BP <80 MM HG: CPT | Mod: CPTII,,, | Performed by: OBSTETRICS & GYNECOLOGY

## 2023-06-14 PROCEDURE — 3008F PR BODY MASS INDEX (BMI) DOCUMENTED: ICD-10-PCS | Mod: CPTII,,, | Performed by: OBSTETRICS & GYNECOLOGY

## 2023-06-14 PROCEDURE — 1159F MED LIST DOCD IN RCRD: CPT | Mod: CPTII,,, | Performed by: OBSTETRICS & GYNECOLOGY

## 2023-06-14 PROCEDURE — 3008F BODY MASS INDEX DOCD: CPT | Mod: CPTII,,, | Performed by: OBSTETRICS & GYNECOLOGY

## 2023-06-14 PROCEDURE — 3074F SYST BP LT 130 MM HG: CPT | Mod: CPTII,,, | Performed by: OBSTETRICS & GYNECOLOGY

## 2023-06-14 PROCEDURE — 81025 POCT URINE PREGNANCY: ICD-10-PCS | Mod: ,,, | Performed by: OBSTETRICS & GYNECOLOGY

## 2023-06-20 LAB — PSYCHE PATHOLOGY RESULT: NORMAL

## 2023-06-26 NOTE — PROGRESS NOTES
Chief Complaint:  f/u for labs    History of Present Illness:  Patient is a 27 y.o.  presents to review her labs secondary to PMH of PCOS. C/o sporadic bleeding. States she's had 4-6 cycles within the last 5 years. +monthly cramping as if she's having a cycle every month.      LMP: 23  Frequency:irregular   Cycle Length: 2 days   Flow: light  Intermenstrual Bleeding: No  Postcoital Bleeding: No  Dysmenorrhea: Yes  Sexually Active: Not Currently   Dyspareunia: No  Contraception: Abstinence   H/o STI: No   Last pap:   H/o Abnormal Pap: No   Colposcopy: NA  Gardasil: 0   MMG: n/a      Review of systems:  General/Constitutional: Chills denies. Fatigue/weakness denies. Fever denies. Night sweats denies. Hot flashes denies  Gastrointestinal: Abdominal pain denies. Blood in stool denies. Constipation denies. Diarrhea denies. Heartburn denies. Nausea denies. Vomiting denies   Genitourinary: Incontinence denies. Blood in urine denies. Frequent urination denies. Urgency denies. Painful urination denies. Nocturia denies   Gynecologic: Irregular menses denies. Heavy bleeding  denies. Painful menses denies. Vaginal discharge denies.Vaginal odor denies. Vaginal itching/Irritation denies. Vaginal lesion denies.  Pelvic pain denies. Decreased libido denies. Vulvar lesion denies. Prolapse of genital organs denies. Painful intercourse denies. Postcoital bleeding denies   Psychiatric: Mood lability denies. Depressed mood denies. Suicidal thoughts denies. Anxiety denies. Overwhelmed denies. Appetite normal. Energy level normal     OB History    Para Term  AB Living   1 1 1 0 0 1   SAB IAB Ectopic Multiple Live Births   0 0 0 0 1      # 1 - Date: 18, Sex: Male, Weight: 3.147 kg (6 lb 15 oz), GA: 38w0d, Delivery: , Low Transverse, Apgar1: None, Apgar5: None, Living: Living, Birth Comments: None      Past Medical History:   Diagnosis Date    Acute angle-closure glaucoma     bilateral    Asthma   "   Cervical radiculopathy     Family history of diabetes mellitus     Fibromyalgia     BRITNEY (generalized anxiety disorder)     Hypertension     Insomnia     Migraine headache     Ovarian cyst     PCOS (polycystic ovarian syndrome)        Current Outpatient Medications:     AIMOVIG AUTOINJECTOR 70 mg/mL autoinjector, Inject 1 mL (70 mg total) into the skin every 28 days., Disp: 1 mL, Rfl: 5    citalopram (CELEXA) 40 MG tablet, Take 1 tablet (40 mg total) by mouth once daily., Disp: 30 tablet, Rfl: 11    cloNIDine (CATAPRES) 0.3 MG tablet, Take 1 tablet (0.3 mg total) by mouth every evening., Disp: 30 tablet, Rfl: 11    ergocalciferol (ERGOCALCIFEROL) 50,000 unit Cap, Take 50,000 Units by mouth., Disp: , Rfl:     gabapentin (NEURONTIN) 300 MG capsule, Take 300 mg by mouth 3 (three) times daily., Disp: , Rfl:     hydroCHLOROthiazide (HYDRODIURIL) 12.5 MG Tab, Take 12.5 mg by mouth., Disp: , Rfl:     lisinopriL (PRINIVIL,ZESTRIL) 30 MG tablet, Take 1 tablet (30 mg total) by mouth once daily., Disp: 30 tablet, Rfl: 11    naproxen sodium (ANAPROX) 220 MG tablet, Take 220 mg by mouth daily as needed., Disp: , Rfl:     tiZANidine (ZANAFLEX) 2 MG tablet, Take 1 tablet (2 mg total) by mouth every evening. (Patient not taking: Reported on 6/14/2023), Disp: 30 tablet, Rfl: 4    UBRELVY 100 mg tablet, Take 1 tablet (100 mg total) by mouth daily as needed for Migraine., Disp: 20 tablet, Rfl: 5    zolpidem (AMBIEN) 10 mg Tab, Take 1 tablet (10 mg total) by mouth nightly as needed (Insomnia)., Disp: 30 tablet, Rfl: 2    Current Facility-Administered Medications:     [START ON 8/23/2023] onabotulinumtoxina injection 200 Units, 200 Units, Intramuscular, 1 time in Clinic/HOD, Mary Fried MD      Physical Exam:  /74 (BP Location: Right arm)   Temp 97.5 °F (36.4 °C)   Ht 5' 2" (1.575 m)   Wt 120.2 kg (264 lb 15.9 oz)   LMP 04/19/2023 (Exact Date) Comment: irregular, 4 cycles in 5 years.  BMI 48.47 kg/m²     Constitutional: " General appearance: healthy, well-nourished and well-developed   Psychiatric: Orientation to time, place and person. Normal mood and affect and active, alert       Assessment/Plan:  1. PCOS, Oligomenorreha  Educated Polycystic ovary syndrome (PCOS) is characterized clinically by oligomenorrhea and hyperandrogenism, as well as the frequent presence of associated risk factors for cardiovascular disease, including obesity, glucose intolerance, dyslipidemia, and obstructive sleep apnea.     Educated on unopposed estrogen  Educated on risk for endometrial cancer. Advised progesterone in form of POP, DMPA or mirena IUD. States understanding, desires to think about it and discuss on RTC    Lab Results PCOS/OLIGO   Component Value Date    PROLACTIN 47.91**     ESTRADIOL 39     TSH 0.155     FSH 7.06     FT4 0.98     TESTOSTERONE pending     HGBA1C 5.0     DHEA pending      2hr GTT not done    2. Elevated prolactin level  Educated  Repeat prolactin    -     Prolactin; Future; Expected date: 06/29/2023    3.. Chronic HTN      Follow up 2 weeks, repeat prolactin, 2hr GTT, testosterone, DHEA  Discuss progesterone management

## 2023-06-28 ENCOUNTER — LAB VISIT (OUTPATIENT)
Dept: LAB | Facility: HOSPITAL | Age: 28
End: 2023-06-28
Attending: OBSTETRICS & GYNECOLOGY
Payer: MEDICAID

## 2023-06-28 DIAGNOSIS — N91.5 OLIGOMENORRHEA, UNSPECIFIED TYPE: ICD-10-CM

## 2023-06-28 DIAGNOSIS — E28.2 PCOS (POLYCYSTIC OVARIAN SYNDROME): ICD-10-CM

## 2023-06-28 LAB
CHOLEST SERPL-MCNC: 199 MG/DL (ref 0–200)
EST. AVERAGE GLUCOSE BLD GHB EST-MCNC: 96.8 MG/DL (ref 70–115)
HBA1C MFR BLD: 5 % (ref 4–6)
HDLC SERPL-MCNC: 52 MG/DL (ref 40–60)
LDLC SERPL DIRECT ASSAY-SCNC: 110.9 MG/DL (ref 30–100)
T4 FREE SERPL-MCNC: 0.98 NG/DL (ref 0.78–2.19)
TRIGL SERPL-MCNC: 99 MG/DL (ref 30–200)
TSH SERPL-ACNC: 0.15 UIU/ML (ref 0.36–3.74)

## 2023-06-28 PROCEDURE — 84146 ASSAY OF PROLACTIN: CPT

## 2023-06-28 PROCEDURE — 36415 COLL VENOUS BLD VENIPUNCTURE: CPT

## 2023-06-28 PROCEDURE — 83001 ASSAY OF GONADOTROPIN (FSH): CPT

## 2023-06-28 PROCEDURE — 83036 HEMOGLOBIN GLYCOSYLATED A1C: CPT

## 2023-06-28 PROCEDURE — 82670 ASSAY OF TOTAL ESTRADIOL: CPT

## 2023-06-28 PROCEDURE — 80061 LIPID PANEL: CPT

## 2023-06-28 PROCEDURE — 84443 ASSAY THYROID STIM HORMONE: CPT

## 2023-06-28 PROCEDURE — 82627 DEHYDROEPIANDROSTERONE: CPT

## 2023-06-28 PROCEDURE — 84402 ASSAY OF FREE TESTOSTERONE: CPT

## 2023-06-28 PROCEDURE — 84439 ASSAY OF FREE THYROXINE: CPT

## 2023-06-29 ENCOUNTER — OFFICE VISIT (OUTPATIENT)
Dept: OBSTETRICS AND GYNECOLOGY | Facility: CLINIC | Age: 28
End: 2023-06-29
Payer: MEDICAID

## 2023-06-29 VITALS
TEMPERATURE: 98 F | HEIGHT: 62 IN | BODY MASS INDEX: 48.76 KG/M2 | WEIGHT: 265 LBS | SYSTOLIC BLOOD PRESSURE: 128 MMHG | DIASTOLIC BLOOD PRESSURE: 74 MMHG

## 2023-06-29 DIAGNOSIS — R79.89 ELEVATED PROLACTIN LEVEL: ICD-10-CM

## 2023-06-29 DIAGNOSIS — E28.2 PCOS (POLYCYSTIC OVARIAN SYNDROME): Primary | ICD-10-CM

## 2023-06-29 LAB
ESTRADIOL SERPL HS-MCNC: 39 PG/ML
FSH SERPL-ACNC: 7.06 MIU/ML
PROLACTIN LEVEL (OHS): 47.91 NG/ML (ref 5.18–26.53)

## 2023-06-29 PROCEDURE — 3078F PR MOST RECENT DIASTOLIC BLOOD PRESSURE < 80 MM HG: ICD-10-PCS | Mod: CPTII,,, | Performed by: OBSTETRICS & GYNECOLOGY

## 2023-06-29 PROCEDURE — 3078F DIAST BP <80 MM HG: CPT | Mod: CPTII,,, | Performed by: OBSTETRICS & GYNECOLOGY

## 2023-06-29 PROCEDURE — 99214 PR OFFICE/OUTPT VISIT, EST, LEVL IV, 30-39 MIN: ICD-10-PCS | Mod: ,,, | Performed by: OBSTETRICS & GYNECOLOGY

## 2023-06-29 PROCEDURE — 1159F PR MEDICATION LIST DOCUMENTED IN MEDICAL RECORD: ICD-10-PCS | Mod: CPTII,,, | Performed by: OBSTETRICS & GYNECOLOGY

## 2023-06-29 PROCEDURE — 3074F SYST BP LT 130 MM HG: CPT | Mod: CPTII,,, | Performed by: OBSTETRICS & GYNECOLOGY

## 2023-06-29 PROCEDURE — 99214 OFFICE O/P EST MOD 30 MIN: CPT | Mod: ,,, | Performed by: OBSTETRICS & GYNECOLOGY

## 2023-06-29 PROCEDURE — 3074F PR MOST RECENT SYSTOLIC BLOOD PRESSURE < 130 MM HG: ICD-10-PCS | Mod: CPTII,,, | Performed by: OBSTETRICS & GYNECOLOGY

## 2023-06-29 PROCEDURE — 3008F PR BODY MASS INDEX (BMI) DOCUMENTED: ICD-10-PCS | Mod: CPTII,,, | Performed by: OBSTETRICS & GYNECOLOGY

## 2023-06-29 PROCEDURE — 4010F PR ACE/ARB THEARPY RXD/TAKEN: ICD-10-PCS | Mod: CPTII,,, | Performed by: OBSTETRICS & GYNECOLOGY

## 2023-06-29 PROCEDURE — 3008F BODY MASS INDEX DOCD: CPT | Mod: CPTII,,, | Performed by: OBSTETRICS & GYNECOLOGY

## 2023-06-29 PROCEDURE — 1159F MED LIST DOCD IN RCRD: CPT | Mod: CPTII,,, | Performed by: OBSTETRICS & GYNECOLOGY

## 2023-06-29 PROCEDURE — 4010F ACE/ARB THERAPY RXD/TAKEN: CPT | Mod: CPTII,,, | Performed by: OBSTETRICS & GYNECOLOGY

## 2023-06-30 LAB — DHEA-S SERPL-MCNC: 183 MCG/DL (ref 83–377)

## 2023-07-05 LAB
TESTOST FREE SERPL-MCNC: 0.72 NG/DL
TESTOST SERPL-MCNC: 29 NG/DL (ref 8–60)

## 2023-07-11 DIAGNOSIS — E28.2 PCOS (POLYCYSTIC OVARIAN SYNDROME): ICD-10-CM

## 2023-07-11 DIAGNOSIS — F45.8 BRUXISM: ICD-10-CM

## 2023-07-11 DIAGNOSIS — G43.711 INTRACTABLE CHRONIC MIGRAINE WITHOUT AURA AND WITH STATUS MIGRAINOSUS: ICD-10-CM

## 2023-07-12 ENCOUNTER — TELEPHONE (OUTPATIENT)
Dept: OBSTETRICS AND GYNECOLOGY | Facility: CLINIC | Age: 28
End: 2023-07-12
Payer: MEDICAID

## 2023-07-12 NOTE — TELEPHONE ENCOUNTER
----- Message from Stephany Guillen sent at 7/11/2023 12:05 PM CDT -----  Regarding: Nurse call  .Type:  Needs Medical Advice    Who Called:  patient  Best Call Back Number:  [phone]  Additional Information:  discussed possible uterine cancer- wondering if that is what the labs are for or if she will have her do something else to determine if that's what she has

## 2023-07-13 ENCOUNTER — PROCEDURE VISIT (OUTPATIENT)
Dept: NEUROLOGY | Facility: CLINIC | Age: 28
End: 2023-07-13
Payer: MEDICAID

## 2023-07-13 VITALS
TEMPERATURE: 98 F | HEART RATE: 96 BPM | RESPIRATION RATE: 12 BRPM | HEIGHT: 62 IN | WEIGHT: 266.81 LBS | OXYGEN SATURATION: 98 % | BODY MASS INDEX: 49.1 KG/M2 | DIASTOLIC BLOOD PRESSURE: 85 MMHG | SYSTOLIC BLOOD PRESSURE: 129 MMHG

## 2023-07-13 DIAGNOSIS — F45.8 BRUXISM: ICD-10-CM

## 2023-07-13 DIAGNOSIS — G43.711 INTRACTABLE CHRONIC MIGRAINE WITHOUT AURA AND WITH STATUS MIGRAINOSUS: Primary | ICD-10-CM

## 2023-07-13 PROCEDURE — 64615 CHEMODENERV MUSC MIGRAINE: CPT | Mod: PBBFAC | Performed by: PSYCHIATRY & NEUROLOGY

## 2023-07-13 PROCEDURE — 64615 PR CHEMODENERVATION OF MUSCLE FOR CHRONIC MIGRAINE: ICD-10-PCS | Mod: S$PBB,,, | Performed by: PSYCHIATRY & NEUROLOGY

## 2023-07-13 PROCEDURE — 64615 CHEMODENERV MUSC MIGRAINE: CPT | Mod: S$PBB,,, | Performed by: PSYCHIATRY & NEUROLOGY

## 2023-07-13 RX ORDER — CYCLOBENZAPRINE HCL 10 MG
10 TABLET ORAL 2 TIMES DAILY PRN
COMMUNITY
End: 2023-09-05 | Stop reason: SDUPTHER

## 2023-07-13 RX ADMIN — ONABOTULINUMTOXINA 200 UNITS: 200 INJECTION, POWDER, LYOPHILIZED, FOR SOLUTION INTRADERMAL; INTRAMUSCULAR at 01:07

## 2023-07-13 NOTE — PROCEDURES
[facility] Neurology Outpatient Botox Procedure Note    History of Present Illness     This is 27 y.o. female with history of morbid obesity, Chiari malformation type 1, anxiety, PCOS, insomnia, hypertension who is referred for chronic migraine without aura with bruxism and left stylohyaloid tendonitis.  Patient is here for Botox 1.     Age of Onset : 10 years old     Headache Description:   occipital, paracervical, stabbing, throbbing, pressure sensation, moderate to severe, lasting >24 hours to 7 days, with nausea, with photophobia and phonophobia.   Left cheek, lower jaw, gum numbness and occasional periorbital throbbing pain.  Also reports jaw clicking sensation at times.     Baseline Headache Frequency: 15 migraine headache days per month for the past 3 years.     Current Prophylactic  Erenumab 70 mg once per month (12/12/2022 to present)  Lisinopril 30 mg daily  Celexa 40 mg daily  Gabapentin 300 mg 3 times a day (12/20/2022 to present)     Current Abortive  Ubrogepant 100 mg twice a day as needed (12/12/2022 to present): effective.   Cyclobenzaprine     Review of System      reviewed. stable.    Focused Exam     Reviewed.  Stable.    Assessment     This is 27 y.o. female with history of morbid obesity, Chiari malformation type 1, anxiety, PCOS, insomnia, hypertension who is referred for chronic migraine without aura with bruxism and left stylohyaloid tendonitis.  Patient is here for Botox 1.     Procedure     Date of procedure: 7/13/2023    Procedure: Chronic Migraine Chemodenervation with Botulinum toxin    The patient was identified and informed consent was reviewed with the patient, and we discussed the risks, benefits and alternatives. Specifically, we discussed the risks of bleeding, infection and nerve injury with worsened pain and function. Specifically, we discussed the most frequently reported adverse reactions following injection of BOTOX include headache (5%), eyelid ptosis (4%), muscular weakness (4%),  bronchitis (3%), injection-site pain (3%), musculoskeletal pain (3%), myalgia (3%), facial paresis (2%), hypertension (2%), and muscle spasms (2%). The patient verbalized an understanding of these risks and the symptoms and the potentially catastrophic consequences of this occurrence. The patient verbalized an understanding that if she should begin to have these symptoms that she should immediately go to the nearest emergency room for evaluation. The patient was then positioned. The injection sites were identified and was prepped with Alcohol. 4cc of preservative free normal saline was mixed with 200 units of Botox. A 30-gauge, 0.5 inch needle was then used to inject a total 155 units of Botox. Muscles injected as below. Patient tolerated the procedure well with no complaints.    A. : Botox dosage: 10 units in 2 sites Right: 5 Left: 5   B. Procerus Botox dosage: 5 Units in 1 site   C. Frontalis Botox dosage: 20 Units divided in 4 sites Right: 10 Left: 10   D. Temporalis Botox dosage: 40 Units divided in 8 sites Right: 20 Left: 20   E. Occipitalis Botox dosage 30 Units divided in 6 sites Right: 15 Left: 15   F. Cervical Paraspinal Botox dosage: 20 Units divided in 4 sites: Right 10 Left: 10   G. Trapezius Botox dosage: 30 Units divided in 6 sites: Right: 15 Left: 15     Total Units Injected: 155 units   Total Units discarded: 45 units     Follow Up       RTC 2 months with NP     RTC Botox      Mary Fried MD   [facility] General Neurology

## 2023-07-17 ENCOUNTER — LAB VISIT (OUTPATIENT)
Dept: LAB | Facility: HOSPITAL | Age: 28
End: 2023-07-17
Attending: OBSTETRICS & GYNECOLOGY
Payer: MEDICAID

## 2023-07-17 DIAGNOSIS — R79.89 ELEVATED PROLACTIN LEVEL: ICD-10-CM

## 2023-07-17 DIAGNOSIS — E28.2 PCOS (POLYCYSTIC OVARIAN SYNDROME): ICD-10-CM

## 2023-07-17 LAB
GLUCOSE 1H P 100 G GLC PO SERPL-MCNC: 123 MG/DL
GLUCOSE 2H P 100 G GLC PO SERPL-MCNC: 99 MG/DL
GLUCOSE P FAST SERPL-MCNC: 83 MG/DL (ref 70–115)
POCT GLUCOSE: 69 MG/DL (ref 70–110)
TESTOST SERPL-MCNC: 38.5 NG/DL (ref 5.77–77)

## 2023-07-17 PROCEDURE — 82627 DEHYDROEPIANDROSTERONE: CPT

## 2023-07-17 PROCEDURE — 36415 COLL VENOUS BLD VENIPUNCTURE: CPT

## 2023-07-17 PROCEDURE — 84403 ASSAY OF TOTAL TESTOSTERONE: CPT

## 2023-07-17 PROCEDURE — 82950 GLUCOSE TEST: CPT

## 2023-07-17 PROCEDURE — 82947 ASSAY GLUCOSE BLOOD QUANT: CPT

## 2023-07-17 PROCEDURE — 84146 ASSAY OF PROLACTIN: CPT

## 2023-07-17 PROCEDURE — 82951 GLUCOSE TOLERANCE TEST (GTT): CPT

## 2023-07-18 LAB — PROLACTIN LEVEL (OHS): 70.7 NG/ML (ref 5.18–26.53)

## 2023-07-19 LAB — DHEA-S SERPL-MCNC: 192 MCG/DL (ref 83–377)

## 2023-07-20 ENCOUNTER — OFFICE VISIT (OUTPATIENT)
Dept: OBSTETRICS AND GYNECOLOGY | Facility: CLINIC | Age: 28
End: 2023-07-20
Payer: MEDICAID

## 2023-07-20 VITALS
BODY MASS INDEX: 48.95 KG/M2 | HEIGHT: 62 IN | DIASTOLIC BLOOD PRESSURE: 74 MMHG | TEMPERATURE: 98 F | WEIGHT: 266 LBS | SYSTOLIC BLOOD PRESSURE: 128 MMHG

## 2023-07-20 DIAGNOSIS — R79.89 ELEVATED PROLACTIN LEVEL: ICD-10-CM

## 2023-07-20 DIAGNOSIS — R10.2 PELVIC PAIN: ICD-10-CM

## 2023-07-20 DIAGNOSIS — N91.5 OLIGOMENORRHEA, UNSPECIFIED TYPE: ICD-10-CM

## 2023-07-20 DIAGNOSIS — E28.2 PCOS (POLYCYSTIC OVARIAN SYNDROME): Primary | ICD-10-CM

## 2023-07-20 PROCEDURE — 4010F PR ACE/ARB THEARPY RXD/TAKEN: ICD-10-PCS | Mod: CPTII,,, | Performed by: OBSTETRICS & GYNECOLOGY

## 2023-07-20 PROCEDURE — 3078F DIAST BP <80 MM HG: CPT | Mod: CPTII,,, | Performed by: OBSTETRICS & GYNECOLOGY

## 2023-07-20 PROCEDURE — 3008F BODY MASS INDEX DOCD: CPT | Mod: CPTII,,, | Performed by: OBSTETRICS & GYNECOLOGY

## 2023-07-20 PROCEDURE — 1159F PR MEDICATION LIST DOCUMENTED IN MEDICAL RECORD: ICD-10-PCS | Mod: CPTII,,, | Performed by: OBSTETRICS & GYNECOLOGY

## 2023-07-20 PROCEDURE — 99214 PR OFFICE/OUTPT VISIT, EST, LEVL IV, 30-39 MIN: ICD-10-PCS | Mod: ,,, | Performed by: OBSTETRICS & GYNECOLOGY

## 2023-07-20 PROCEDURE — 99214 OFFICE O/P EST MOD 30 MIN: CPT | Mod: ,,, | Performed by: OBSTETRICS & GYNECOLOGY

## 2023-07-20 PROCEDURE — 3078F PR MOST RECENT DIASTOLIC BLOOD PRESSURE < 80 MM HG: ICD-10-PCS | Mod: CPTII,,, | Performed by: OBSTETRICS & GYNECOLOGY

## 2023-07-20 PROCEDURE — 1159F MED LIST DOCD IN RCRD: CPT | Mod: CPTII,,, | Performed by: OBSTETRICS & GYNECOLOGY

## 2023-07-20 PROCEDURE — 3074F PR MOST RECENT SYSTOLIC BLOOD PRESSURE < 130 MM HG: ICD-10-PCS | Mod: CPTII,,, | Performed by: OBSTETRICS & GYNECOLOGY

## 2023-07-20 PROCEDURE — 3008F PR BODY MASS INDEX (BMI) DOCUMENTED: ICD-10-PCS | Mod: CPTII,,, | Performed by: OBSTETRICS & GYNECOLOGY

## 2023-07-20 PROCEDURE — 3044F HG A1C LEVEL LT 7.0%: CPT | Mod: CPTII,,, | Performed by: OBSTETRICS & GYNECOLOGY

## 2023-07-20 PROCEDURE — 3074F SYST BP LT 130 MM HG: CPT | Mod: CPTII,,, | Performed by: OBSTETRICS & GYNECOLOGY

## 2023-07-20 PROCEDURE — 4010F ACE/ARB THERAPY RXD/TAKEN: CPT | Mod: CPTII,,, | Performed by: OBSTETRICS & GYNECOLOGY

## 2023-07-20 PROCEDURE — 3044F PR MOST RECENT HEMOGLOBIN A1C LEVEL <7.0%: ICD-10-PCS | Mod: CPTII,,, | Performed by: OBSTETRICS & GYNECOLOGY

## 2023-07-20 NOTE — PROGRESS NOTES
Chief Complaint:  F/u Results     History of Present Illness:  Patient is a 27 y.o.  with PMH of PCOS presents review labs due to an elevated prolactin level. C/o sporadic bleeding. States she's had 4-6 cycles within the last 5 years. First cycle this year was in April. +monthly cramping as if she's having a cycle every month.     C/o one month history of worsening intermittent pelvic pain. Pain is more in the center of her lower abdomen. Improvement with ibuprofen/tylenol. No relation to PO intake, BM or urination. State mild cramp in nature. No pain today.     LMP: 23  Frequency:irregular   Cycle Length: 2 days   Flow: light  Intermenstrual Bleeding: No  Postcoital Bleeding: No  Dysmenorrhea: Yes  Sexually Active: Not Currently   Dyspareunia: No  Contraception: Abstinence   H/o STI: No   Last pap: 23 WNL   H/o Abnormal Pap: No   Colposcopy: NA  Gardasil: 0   MMG: n/a      Review of systems:  General/Constitutional: Chills denies. Fatigue/weakness denies. Fever denies. Night sweats denies. Hot flashes denies  Gastrointestinal: Abdominal pain denies. Blood in stool denies. Constipation denies. Diarrhea denies. Heartburn denies. Nausea denies. Vomiting denies   Genitourinary: Incontinence denies. Blood in urine denies. Frequent urination denies. Urgency denies. Painful urination denies. Nocturia denies   Gynecologic: Irregular menses admits. Heavy bleeding  denies. Painful menses denies. Vaginal discharge denies.Vaginal odor denies. Vaginal itching/Irritation denies. Vaginal lesion denies.  Pelvic pain admits. Decreased libido denies. Vulvar lesion denies. Prolapse of genital organs denies. Painful intercourse denies. Postcoital bleeding denies   Psychiatric: Mood lability denies. Depressed mood denies. Suicidal thoughts denies. Anxiety denies. Overwhelmed denies. Appetite normal. Energy level normal     OB History    Para Term  AB Living   1 1 1 0 0 1   SAB IAB Ectopic Multiple Live  "Births   0 0 0 0 1      # 1 - Date: [birth year], Sex: Male, Weight: 3.147 kg (6 lb 15 oz), GA: 38w0d, Delivery: , Low Transverse, Apgar1: None, Apgar5: None, Living: Living, Birth Comments: None      Past Medical History:   Diagnosis Date    Acute angle-closure glaucoma     bilateral    Asthma     Cervical radiculopathy     Family history of diabetes mellitus     Fibromyalgia     BRITNEY (generalized anxiety disorder)     Hypertension     Insomnia     Migraine headache     Ovarian cyst     PCOS (polycystic ovarian syndrome)        Current Outpatient Medications:     citalopram (CELEXA) 40 MG tablet, Take 1 tablet (40 mg total) by mouth once daily., Disp: 30 tablet, Rfl: 11    cloNIDine (CATAPRES) 0.3 MG tablet, Take 1 tablet (0.3 mg total) by mouth every evening., Disp: 30 tablet, Rfl: 11    cyclobenzaprine (FLEXERIL) 10 MG tablet, Take 10 mg by mouth 2 (two) times daily as needed for Muscle spasms., Disp: , Rfl:     ergocalciferol (ERGOCALCIFEROL) 50,000 unit Cap, Take 50,000 Units by mouth every 7 days., Disp: , Rfl:     gabapentin (NEURONTIN) 300 MG capsule, Take 300 mg by mouth 3 (three) times daily., Disp: , Rfl:     hydroCHLOROthiazide (HYDRODIURIL) 12.5 MG Tab, Take 12.5 mg by mouth once daily., Disp: , Rfl:     lisinopriL (PRINIVIL,ZESTRIL) 30 MG tablet, Take 1 tablet (30 mg total) by mouth once daily., Disp: 30 tablet, Rfl: 11    naproxen sodium (ANAPROX) 220 MG tablet, Take 220 mg by mouth daily as needed., Disp: , Rfl:     UBRELVY 100 mg tablet, Take 1 tablet (100 mg total) by mouth daily as needed for Migraine., Disp: 20 tablet, Rfl: 5    zolpidem (AMBIEN) 10 mg Tab, Take 1 tablet (10 mg total) by mouth nightly as needed (Insomnia)., Disp: 30 tablet, Rfl: 2    Current Facility-Administered Medications:     [START ON 10/12/2023] onabotulinumtoxina injection 200 Units, 200 Units, Intramuscular, 1 time in Clinic/HOD, Mary Fried MD      Physical Exam:  /74   Temp 98.1 °F (36.7 °C)   Ht 5' 2" " (1.575 m)   Wt 120.7 kg (266 lb)   LMP  (LMP Unknown)   BMI 48.65 kg/m²     Constitutional: General appearance: healthy, well-nourished and well-developed   Psychiatric:  Orientation to time, place and person. Normal mood and affect and active, alert   Abdomen: Auscultation/Inspection/Palpation: No tenderness or masses. Soft, nondistended       Declines pelvic exam      Assessment/Plan:  1. PCOS,  Oligomenorrhea  Educated Polycystic ovary syndrome (PCOS) is characterized clinically by oligomenorrhea and hyperandrogenism, as well as the frequent presence of associated risk factors for cardiovascular disease, including obesity, glucose intolerance, dyslipidemia, and obstructive sleep apnea.      Educated on unopposed estrogen  Educated on risk for endometrial cancer. Advised progesterone in form of POP, DMPA or mirena IUD. States understanding, desires to think about it and discuss on RTC     Lab Results PCOS/OLIGO   Component Value Date     PROLACTIN 47.91**  6/28/23    PROLACTIN 70.70** 7/17/23     ESTRADIOL 39  6/28/23     TSH 0.155  6/28/23     FSH 7.06  6/28/23     FT4 0.98  6/28/23     TESTOSTERONE 38.50 7/17/23      HGBA1C 5.0 6/28/23    DHEA 183 6/28/23     DHEA 192 7/17/23    2hr GTT 83, 123, 99 7/17/23    Cholesterol total 199 6/28/23    HDL 52 6/28/23    Triglyceride  99 6/28/23     6/28/23       3. Elevated prolactin level  Educated    Reviewed MRI from 12/2022. Due to no mass and mild elevation of prolactin, will not repeat MRI at this time. States  known \ h/o Chiari malformation      Repeat prl in 3 mo       7/17/23: 70.70  6/28/23: 47.91     4. Pelvic Pain  Educated    NSAIDs, heating pad, warm bath    Pain/ER precautions   RTS pelvis  Urine swab: gc/cz/tv/myco/urea    Declines pelvic exam    Discussed with patient not a candidate for Estrogen containing contraceptive management       Explained common options for contraception including natural family planning, barrier methods, depo-provera,  POP, IUD, Nexplanon, and sterilization.        If BTB continues after 3 months, a change may be necessary. A second birth control method should be used for the first 7 days.        Advised patient that smoking is harmful due to increased risks of stroke, heart attack and blood clots when taking progesterone. Patient to contact us immediately if she experiences severe abdominal pain, severe chest pain, severe headaches, eye-visual changes, severe leg pain or SOB.       Discussed that birth control, such as pills, patch, Nuva Ring or Depo Provera, Nexplanon, or IUDs do not protect against STDs.        Patient desires Mirena IUD  RTC in 2 weeks for insertion and review US       MRI    I see no intraparenchymal masses, dominant wedge shaped infarcts, or hemorrhagic lesions.  The ventricles and sulci are of normal configuration.    I see no extra-axial masses or abnormal fluid collections.  The orbits and intraorbital contents, as   well as, the paranasal sinuses, pituitary gland, brain stem and cranial nerves, where visualized, appear unremarkable.   There is 3-5 mm of herniation of the cerebellar tonsils through the foramen magnum.  A 2 cm, benign-appearing mucous retention cyst   is noted within the right maxillary sinus.            IMPRESSION:          1.  There is 3-5 mm of herniation of the cerebellar tonsils through the foramen magnum (Chiari I type malformation).  This can be associated with recurrent headaches, and clinical correlation is recommended.        2.  A 2 cm, benign-appearing mucous retention cyst is noted within the right maxillary sinus.

## 2023-07-25 ENCOUNTER — TELEPHONE (OUTPATIENT)
Dept: OBSTETRICS AND GYNECOLOGY | Facility: CLINIC | Age: 28
End: 2023-07-25
Payer: MEDICAID

## 2023-07-25 NOTE — TELEPHONE ENCOUNTER
Dr. Leon not in currently. Symptoms should not affect her pelvis US. Patient feels like it's urgent, can go to urgent care or ER. I will speak with JOURDAN when she comes in.

## 2023-07-25 NOTE — TELEPHONE ENCOUNTER
----- Message from Lindy Orrodrigolio sent at 7/25/2023  9:27 AM CDT -----  Regarding: Call Back  Type:  Patient Returning Call    Who Called:Pt  Who Left Message for Patient:  Does the patient know what this is regarding?:  Would the patient rather a call back or a response via [facility]?   Best Call Back Number:[phone]  Additional Information: Pt was seen last week and has imaging scheduled tomorrow. She said that when waking up this morning she noticed swelling in her legs, no appetite, frequent bowel movements,fatigue,spotting yesterday and nausea when eating.   Pt said she has not saw PCP recently and has been seeing Dr. Leon due to irregular cycle.

## 2023-07-26 ENCOUNTER — HOSPITAL ENCOUNTER (OUTPATIENT)
Dept: RADIOLOGY | Facility: HOSPITAL | Age: 28
Discharge: HOME | End: 2023-07-26
Attending: OBSTETRICS & GYNECOLOGY
Payer: MEDICAID

## 2023-07-26 DIAGNOSIS — R10.2 PELVIC PAIN: ICD-10-CM

## 2023-07-26 PROCEDURE — 76856 US EXAM PELVIC COMPLETE: CPT | Mod: TC

## 2023-08-02 NOTE — PROGRESS NOTES
Chief Complaint:  F/U Pelvic U/S     History of Present Illness:   Princess Colindres is a 27 y.o.  who presents to review recent imaging secondary to pelvic pain and sporadic bleeding.       LMP: 23  Frequency: irregualr  Cycle Length: 2 days   Flow: light  Intermenstrual Bleeding: No  Postcoital Bleeding: No  Dysmenorrhea: Yes  Sexually Active: not currently   Dyspareunia: No  Contraception: abstinence   H/o STI: No   Last pap: 23 NIL  H/o abnl pap prior to current: No   Colposcopy: no  Gardasil: 0/3   MMG: n/a  H/o abnl MMG: n/a  Colonoscopy: n/a      Review of Systems:  General/Constitutional: Chills denies. Fatigue/weakness denies. Fever denies . Night sweats denies . Hot flashes denies  Gynecologic: Irregular menses admits.  Heavy bleeding  denies.  Painful menses denies.  Vaginal discharge denies. Vaginal odor denies. Vaginal itching/Irritation denies. Vaginal lesion denies.  Pelvic pain denies. Decreased libido denies. Vulvar lesion denies. Prolapse of genital organs denies. Painful intercourse denies. Postcoital bleeding denies   Psychiatric: Mood lability denies.  Depressed mood denies. Suicidal thoughts denies. Anxiety denies.  Overwhelmed denies.  Appetite normal. Energy level normal      OB History    Para Term  AB Living   1 1 1 0 0 1   SAB IAB Ectopic Multiple Live Births   0 0 0 0 1      # 1 - Date: [birth year], Sex: Male, Weight: 3.147 kg (6 lb 15 oz), GA: 38w0d, Delivery: , Low Transverse, Apgar1: None, Apgar5: None, Living: Living, Birth Comments: None      Current Outpatient Medications:     citalopram (CELEXA) 40 MG tablet, Take 1 tablet (40 mg total) by mouth once daily., Disp: 30 tablet, Rfl: 11    cloNIDine (CATAPRES) 0.3 MG tablet, Take 1 tablet (0.3 mg total) by mouth every evening., Disp: 30 tablet, Rfl: 11    cyclobenzaprine (FLEXERIL) 10 MG tablet, Take 10 mg by mouth 2 (two) times daily as needed for Muscle spasms., Disp: , Rfl:     ergocalciferol  "(ERGOCALCIFEROL) 50,000 unit Cap, Take 50,000 Units by mouth every 7 days., Disp: , Rfl:     gabapentin (NEURONTIN) 300 MG capsule, Take 300 mg by mouth 3 (three) times daily., Disp: , Rfl:     hydroCHLOROthiazide (HYDRODIURIL) 12.5 MG Tab, Take 12.5 mg by mouth once daily., Disp: , Rfl:     lisinopriL (PRINIVIL,ZESTRIL) 30 MG tablet, Take 1 tablet (30 mg total) by mouth once daily., Disp: 30 tablet, Rfl: 11    naproxen sodium (ANAPROX) 220 MG tablet, Take 220 mg by mouth daily as needed., Disp: , Rfl:     UBRELVY 100 mg tablet, Take 1 tablet (100 mg total) by mouth daily as needed for Migraine., Disp: 20 tablet, Rfl: 5    zolpidem (AMBIEN) 10 mg Tab, Take 1 tablet (10 mg total) by mouth nightly as needed (Insomnia)., Disp: 30 tablet, Rfl: 2    Current Facility-Administered Medications:     [START ON 10/12/2023] onabotulinumtoxina injection 200 Units, 200 Units, Intramuscular, 1 time in Clinic/HOD, Mary Fried MD    Physical Exam:  /74   Temp 98.1 °F (36.7 °C)   Ht 5' 2" (1.575 m)   Wt 121 kg (266 lb 12.8 oz)   LMP  (LMP Unknown)   BMI 48.80 kg/m²     Chaperone present.    Constitutional: General appearance: healthy, well-nourished and well-developed  Psychiatric: Orientation to time, place and person. Normal mood and affect and active, alert  Abdomen: Auscultation/Inspection/Palpation: deferred  Female Genitalia:  Vulva: no masses, atrophy or lesions  Bladder/Urethra: no urethral discharge or mass, normal meatus, bladder   non-distended.  Vagina: no tenderness, erythema, cystocele, rectocele, abnormal  vaginal discharge, or vesicle(s) or ulcers   Cervix: no discharge or cervical motion tenderness and grossly normal  Uterus: normal size and shape and midline, non-tender, and no uterine   prolapse.  Adnexa/Parametria: no parametrial tenderness or mass, no adnexal tenderness  or ovarian mass.    Procedure:   After having reviewed the contraindications, side effects, and risks and benefits of all major " options for reversible contraception, the patient chose the Mirena IUD for contraception. We discussed the risks of insertion, pelvic infection, and the possibility of failure. Patient stated that she has a good understanding of all we discussed, and all questions were answered regarding IUD use. Patient signed consent form.   Pelvic examination was normal. Pap smear is current. Urine pregnancy test negative.  With the patient in the dorsal lithotomy position, a speculum was placed in the vagina. The cervix and vagina were prepped with Betadine, the cervix was stabilized with a tenaculum, and the uterus was sounded to a depth of _ cm. The Mirena IUD was then placed in the endometrial cavity as directed without complications. The strings were trimmed to approximately to 2 cm.  Patient tolerated procedure well.      Assessment/Plan:  1. Pelvic pain  Educated    Reviewed imaging with patient, see below  NSAIDs, heating pad, warm bath    Pain/ER precautions     US pelvis  7/26/23  - Uterus: 6.3 x 3.7 x 5.4 cm   - ES: 4 mm  - ROV: 2.5 x 1.8 x 2.4 cm   - LOV: 2.8 x 2.1 x 1.8 cm   - Free fluid: no evidence of free fluid     Urine swab 7/20/23: neg gc/cz/tv/myco/urea       2. IUD insert  Explained to patient, and options for contraceptive including natural family planning, barrier methods, oral contraceptives, patch, NuvaRing, Depo-Provera, Nexplanon, IUD, and sterilization     Patient desires IUD insertion.      Risk factors reviewed and not present.      HCG negative.       Tolerated well.       Discussed cramping and bleeding for 6-8 weeks.  If breakthrough bleeding continues after 3 months add back or change may be necessary.       A second form of birth control should be used for the first 10 days after insertion.       Advised patient that smoking is harmful due to increased risk of stroke, heart attack, and blood clots when taking contraceptive hormones. Patient is to contact us immediately if she experiences severe  abdominal pain, severe chest pain, severe headaches, eye visual changes, severe leg pain, or shortness of breath       Call with fever, foul discharge, or intense abdominal pain. Ibuprofen for cramping.       Return to clinic 3 weeks for string check.     Reminded IUD no longer effective after 8 years      Lot #[id number]  Exp: 08/31/2025    3. Elevated prolactin level     7/17/23: 70.70  6/28/23: 47.91     MRI from 12/2022 to eval CSF flow secondary to known Chiari malformation  . Due to no mass and mild elevation of prolactin, will not repeat MRI at this time.   Repeat prl in 3 mo

## 2023-08-03 ENCOUNTER — OFFICE VISIT (OUTPATIENT)
Dept: OBSTETRICS AND GYNECOLOGY | Facility: CLINIC | Age: 28
End: 2023-08-03
Payer: MEDICAID

## 2023-08-03 VITALS
WEIGHT: 266.81 LBS | DIASTOLIC BLOOD PRESSURE: 74 MMHG | SYSTOLIC BLOOD PRESSURE: 126 MMHG | BODY MASS INDEX: 49.1 KG/M2 | TEMPERATURE: 98 F | HEIGHT: 62 IN

## 2023-08-03 DIAGNOSIS — Z30.430 ENCOUNTER FOR IUD INSERTION: Primary | ICD-10-CM

## 2023-08-03 DIAGNOSIS — R79.89 ELEVATED PROLACTIN LEVEL: ICD-10-CM

## 2023-08-03 DIAGNOSIS — R10.2 PELVIC PAIN: ICD-10-CM

## 2023-08-03 PROCEDURE — 58300 PR INSERT INTRAUTERINE DEVICE: ICD-10-PCS | Mod: ,,, | Performed by: OBSTETRICS & GYNECOLOGY

## 2023-08-03 PROCEDURE — 58300 INSERT INTRAUTERINE DEVICE: CPT | Mod: ,,, | Performed by: OBSTETRICS & GYNECOLOGY

## 2023-08-03 PROCEDURE — 3008F BODY MASS INDEX DOCD: CPT | Mod: CPTII,,, | Performed by: OBSTETRICS & GYNECOLOGY

## 2023-08-03 PROCEDURE — 3078F PR MOST RECENT DIASTOLIC BLOOD PRESSURE < 80 MM HG: ICD-10-PCS | Mod: CPTII,,, | Performed by: OBSTETRICS & GYNECOLOGY

## 2023-08-03 PROCEDURE — 3078F DIAST BP <80 MM HG: CPT | Mod: CPTII,,, | Performed by: OBSTETRICS & GYNECOLOGY

## 2023-08-03 PROCEDURE — 3074F PR MOST RECENT SYSTOLIC BLOOD PRESSURE < 130 MM HG: ICD-10-PCS | Mod: CPTII,,, | Performed by: OBSTETRICS & GYNECOLOGY

## 2023-08-03 PROCEDURE — 3044F PR MOST RECENT HEMOGLOBIN A1C LEVEL <7.0%: ICD-10-PCS | Mod: CPTII,,, | Performed by: OBSTETRICS & GYNECOLOGY

## 2023-08-03 PROCEDURE — 99213 PR OFFICE/OUTPT VISIT, EST, LEVL III, 20-29 MIN: ICD-10-PCS | Mod: 25,,, | Performed by: OBSTETRICS & GYNECOLOGY

## 2023-08-03 PROCEDURE — 4010F PR ACE/ARB THEARPY RXD/TAKEN: ICD-10-PCS | Mod: CPTII,,, | Performed by: OBSTETRICS & GYNECOLOGY

## 2023-08-03 PROCEDURE — 3074F SYST BP LT 130 MM HG: CPT | Mod: CPTII,,, | Performed by: OBSTETRICS & GYNECOLOGY

## 2023-08-03 PROCEDURE — 3008F PR BODY MASS INDEX (BMI) DOCUMENTED: ICD-10-PCS | Mod: CPTII,,, | Performed by: OBSTETRICS & GYNECOLOGY

## 2023-08-03 PROCEDURE — 4010F ACE/ARB THERAPY RXD/TAKEN: CPT | Mod: CPTII,,, | Performed by: OBSTETRICS & GYNECOLOGY

## 2023-08-03 PROCEDURE — 1159F PR MEDICATION LIST DOCUMENTED IN MEDICAL RECORD: ICD-10-PCS | Mod: CPTII,,, | Performed by: OBSTETRICS & GYNECOLOGY

## 2023-08-03 PROCEDURE — 3044F HG A1C LEVEL LT 7.0%: CPT | Mod: CPTII,,, | Performed by: OBSTETRICS & GYNECOLOGY

## 2023-08-03 PROCEDURE — 1159F MED LIST DOCD IN RCRD: CPT | Mod: CPTII,,, | Performed by: OBSTETRICS & GYNECOLOGY

## 2023-08-03 PROCEDURE — 99213 OFFICE O/P EST LOW 20 MIN: CPT | Mod: 25,,, | Performed by: OBSTETRICS & GYNECOLOGY

## 2023-09-05 DIAGNOSIS — G89.29 CHRONIC BILATERAL LOW BACK PAIN WITHOUT SCIATICA: Primary | ICD-10-CM

## 2023-09-05 DIAGNOSIS — G47.00 INSOMNIA, UNSPECIFIED TYPE: ICD-10-CM

## 2023-09-05 DIAGNOSIS — M79.7 FIBROMYALGIA: ICD-10-CM

## 2023-09-05 DIAGNOSIS — M54.50 CHRONIC BILATERAL LOW BACK PAIN WITHOUT SCIATICA: Primary | ICD-10-CM

## 2023-09-05 RX ORDER — ZOLPIDEM TARTRATE 10 MG/1
10 TABLET ORAL NIGHTLY PRN
Qty: 30 TABLET | Refills: 2 | Status: SHIPPED | OUTPATIENT
Start: 2023-09-05 | End: 2023-12-18 | Stop reason: SDUPTHER

## 2023-09-05 RX ORDER — CYCLOBENZAPRINE HCL 10 MG
10 TABLET ORAL 2 TIMES DAILY PRN
Qty: 60 TABLET | Refills: 2 | Status: SHIPPED | OUTPATIENT
Start: 2023-09-05 | End: 2023-12-12 | Stop reason: SDUPTHER

## 2023-09-05 RX ORDER — GABAPENTIN 300 MG/1
300 CAPSULE ORAL 3 TIMES DAILY
Qty: 90 CAPSULE | Refills: 2 | Status: SHIPPED | OUTPATIENT
Start: 2023-09-05 | End: 2023-12-12 | Stop reason: SDUPTHER

## 2023-09-08 ENCOUNTER — TELEPHONE (OUTPATIENT)
Dept: FAMILY MEDICINE | Facility: CLINIC | Age: 28
End: 2023-09-08
Payer: MEDICAID

## 2023-09-11 PROBLEM — E66.813 CLASS 3 SEVERE OBESITY DUE TO EXCESS CALORIES WITH SERIOUS COMORBIDITY AND BODY MASS INDEX (BMI) OF 45.0 TO 49.9 IN ADULT: Status: ACTIVE | Noted: 2023-01-18

## 2023-09-11 NOTE — PROGRESS NOTES
[facility] Neurology Telemedicine Follow Up Visit Note    Initial Visit Date: 5/25/2023  Last Visit Date: 7/13/2023  Current Visit Date:  09/12/2023    Chief Complaint:     Chief Complaint   Patient presents with    Migraine     Patient reports 1-2 a week. Increased intensity.       History of Present Illness:      This is 27 y.o. female with history of morbid obesity, Chiari malformation type 1, anxiety, PCOS, insomnia, hypertension who was referred headache disorder. Pt was last seen on 7/13/2023 where she received Botox for Migraine and baclofen was initiated, cyclobenzaprine was discontinued. Discussed need to wear mouth guard.    Today, Pt states she noticed a decrease in migraines immediately after Botox. Now, is averaging 1-2 severe migraines per week. Did not tolerate Baclofen as she states it caused nausea and vomiting. No longer on injectable. Ubrelvy continues to be effective as abortive therapy. However, went to urgent care for Toradol for severe migraine that occurred last Friday. Is taking cyclobenzaprine instead of Baclofen.    Age of Onset : 10 years old    Headache Description:   occipital, paracervical, stabbing, throbbing, pressure sensation, moderate to severe, lasting >24 hours to 7 days, with nausea, with photophobia and phonophobia.   Left cheek, lower jaw, gum numbness and occasional periorbital throbbing pain.  Also reports jaw clicking sensation at times.    Frequency: 15 migraine headache days per month for the past 3 years.     Provocation Factors: denied    Risk Factors  - Family history of headache disorder: Yes mom and sister with headaches. Maternal grandfather with headache disorder.   - History of focal CNS lesions: No  - History of CNS infections: No  - History head trauma: No  - History of underlying mood disorder: Yes mood swings.   - History of sleep disorder: Yes not well.  Severe bruxism.  Tends to by through mouth guards when she was young.  Family history of bruxism.   -  Recreational drug use: No  - Tobacco use: No  - Alcohol use: No  - Weight fluctuation: Yes morbid obesity.  - Isotretinoin or Tetracycline use:  No  - Family planning and contraceptive use: No    Medications:     Current Prophylactic  Erenumab 70 mg once per month (12/12/2022 to present)  Lisinopril 30 mg daily  Celexa 40 mg daily  Gabapentin 300 mg 3 times a day (12/20/2022 to present)    Current Abortive  Ubrogepant 100 mg twice a day as needed (12/12/2022 to present): effective.   Cyclobenzaprine   Aleve PRN: daily for the past few weeks   Tylenol PRN: one day per week  Advil PRN: one day     Prior Prophylactic  Acetazolamide  Topiramate (1/2022): nausea   Amitriptyline (2/2022): worsening depression   Emgality 120 mg once per month: ineffective    Prior Abortive  Denied     Devices:     - VNS:  - TNS  - TMS:     Procedures:     - Botox:  #1 7/13/2023    - PSG block:   - Occipital nerve block:     Labs:     Results for orders placed or performed in visit on 07/17/23   Prolactin   Result Value Ref Range    Prolactin Level 70.70 (H) 5.18 - 26.53 ng/mL   Testosterone   Result Value Ref Range    Testosterone Total 38.50 5.77 - 77.00 ng/dL   DHEA-Sulfate   Result Value Ref Range    DHEA SULFATE 192 83 - 377 mcg/dL   GTT Fasting   Result Value Ref Range    Glucose Fasting 83 70 - 115 mg/dL   GTT 1 Hour   Result Value Ref Range    Glucose 1 Hour 123 mg/dL   GTT 2 Hour   Result Value Ref Range    Glucose 2 Hour 99 mg/dL   POCT glucose   Result Value Ref Range    POCT Glucose 69 (L) 70 - 110 mg/dL       Studies:     - MRI Brain CSF flow 2/22/2023:  I have reviewed the study independently and with the patient.  Patent flow.  - MRI Brain without contrast 12/6/2022:  I have reviewed the study independently and with the patient. 4 mm tonsillar ectopia.  No crowding.  - MRA Head w/o Juan Luis:   - MRV Head w/o Juan Luis:   - NCHCT:  - Lumbar Puncture:    Review of Systems:     Review of Systems   All other systems reviewed and are  negative.  As per HPI    Physical Exams:     There were no vitals filed for this visit.    Physical Exam  Vitals and nursing note reviewed.   Constitutional:       Appearance: Normal appearance.   HENT:      Head: Normocephalic and atraumatic.      Comments: Bilateral masseter tenderness to palpation.  Mild TMJ subluxation on the left.     Nose: Nose normal.      Mouth/Throat:      Mouth: Mucous membranes are moist.      Pharynx: Oropharynx is clear.   Eyes:      Conjunctiva/sclera: Conjunctivae normal.   Cardiovascular:      Rate and Rhythm: Normal rate and regular rhythm.      Pulses: Normal pulses.   Pulmonary:      Effort: Pulmonary effort is normal.      Breath sounds: Normal breath sounds.   Abdominal:      General: Abdomen is flat.   Musculoskeletal:         General: Normal range of motion.      Cervical back: Normal range of motion.   Skin:     General: Skin is warm.   Neurological:      Mental Status: She is alert.         Comprehensive Neurological Exam:  Mental Status: Alert Oriented to Self, Date, and Place. Comprehension wnl. No dysarthria.   CN II - XII: GOMEZ, No APD, Fundus wnl OU, VFFC, No ptosis OU, EOMI without nystagmus LT/Temp symmetric in CN V1-3 distribution, Hearing grossly intact, Face Symmetric, Tongue and Uvula midline, Trapezius symmetric bilateral.   Motor: tone and bulk wnl throughout, no abnormal involuntary or voluntary movements, 5/5 to confrontation, Fine finger movements wnl b/l, No pronator drift.   Sensory: LT, Proprioception, Vibration, PP, Temp symmetric.   Reflexes: 2+ throughout, plantar reflexes downward bilateral.   Cerebellar: FNF wnl b/l, RAHM wnl b/l  Romberg: Negative  Gait: normal. Heel Gait, Toe Gait, Tandem Gait wnl.     Assessment:     This is 27 y.o. female with history of morbid obesity, Chiari malformation type 1, anxiety, PCOS, insomnia, hypertension who was referred for chronic migraine without aura with bruxism and left stylohyaloid tendonitis.    Problem List  Items Addressed This Visit          Neuro    Intractable chronic migraine without aura and with status migrainosus - Primary       Endocrine    Class 3 severe obesity due to excess calories with serious comorbidity and body mass index (BMI) of 45.0 to 49.9 in adult     Plan:     [] c/w Gabapentin 300 mg 3 times a day   [] c/w Ubrogepant 100 mg twice a day as needed - triptans are contraindicated in patient with severe hypertension.  [] Patient advised to wear mouth guard if possible.  [] c/w cyclobenzaprine 10mg BID PRN  [] start exercise program for overall health and wellness    [] Migraine Botox Protocol:  A. : Botox dosage: 10 units in 2 sites Right: 5 Left: 5   B. Procerus Botox dosage: 5 Units in 1 site   C. Frontalis Botox dosage: 20 Units divided in 4 sites Right: 10 Left: 10   D. Temporalis Botox dosage: 40 Units divided in 8 sites Right: 20 Left: 20   E. Occipitalis Botox dosage 30 Units divided in 6 sites Right: 15 Left: 15   F. Cervical Paraspinal Botox dosage: 20 Units divided in 4 sites: Right 10 Left: 10   G. Trapezius Botox dosage: 30 Units divided in 6 sites: Right: 15 Left: 15     RTC 10/12/2023 @ 11 AM for Botox #2    Headache education provided: good sleep hygiene and 7 hours of sleep per night, stress management, medication overuse education provided. Using more 3 OTC per week may worsen headaches, high intensity interval training has shown to reduce headache frequency. Low carb, high protein has shown to reduce headache frequency. Patient is instructed in keep headache diary.     I have explained the treatment plan, diagnosis, and prognosis to patient. All questions are answered to the best of my knowledge.     Face to face time 30 minutes, including documentation, chart review, counseling, education, review of test results, relevant medical records, and coordination of care.   09/12/2023

## 2023-09-12 ENCOUNTER — OFFICE VISIT (OUTPATIENT)
Dept: NEUROLOGY | Facility: CLINIC | Age: 28
End: 2023-09-12
Payer: MEDICAID

## 2023-09-12 DIAGNOSIS — G43.711 INTRACTABLE CHRONIC MIGRAINE WITHOUT AURA AND WITH STATUS MIGRAINOSUS: Primary | ICD-10-CM

## 2023-09-12 DIAGNOSIS — E66.01 CLASS 3 SEVERE OBESITY DUE TO EXCESS CALORIES WITH SERIOUS COMORBIDITY AND BODY MASS INDEX (BMI) OF 45.0 TO 49.9 IN ADULT: ICD-10-CM

## 2023-09-12 PROCEDURE — 99214 OFFICE O/P EST MOD 30 MIN: CPT | Mod: 95,,, | Performed by: NURSE PRACTITIONER

## 2023-09-12 PROCEDURE — 3044F PR MOST RECENT HEMOGLOBIN A1C LEVEL <7.0%: ICD-10-PCS | Mod: CPTII,95,, | Performed by: NURSE PRACTITIONER

## 2023-09-12 PROCEDURE — 4010F PR ACE/ARB THEARPY RXD/TAKEN: ICD-10-PCS | Mod: CPTII,95,, | Performed by: NURSE PRACTITIONER

## 2023-09-12 PROCEDURE — 4010F ACE/ARB THERAPY RXD/TAKEN: CPT | Mod: CPTII,95,, | Performed by: NURSE PRACTITIONER

## 2023-09-12 PROCEDURE — 1159F MED LIST DOCD IN RCRD: CPT | Mod: CPTII,95,, | Performed by: NURSE PRACTITIONER

## 2023-09-12 PROCEDURE — 3044F HG A1C LEVEL LT 7.0%: CPT | Mod: CPTII,95,, | Performed by: NURSE PRACTITIONER

## 2023-09-12 PROCEDURE — 99214 PR OFFICE/OUTPT VISIT, EST, LEVL IV, 30-39 MIN: ICD-10-PCS | Mod: 95,,, | Performed by: NURSE PRACTITIONER

## 2023-09-12 PROCEDURE — 1159F PR MEDICATION LIST DOCUMENTED IN MEDICAL RECORD: ICD-10-PCS | Mod: CPTII,95,, | Performed by: NURSE PRACTITIONER

## 2023-09-12 PROCEDURE — 1160F RVW MEDS BY RX/DR IN RCRD: CPT | Mod: CPTII,95,, | Performed by: NURSE PRACTITIONER

## 2023-09-12 PROCEDURE — 1160F PR REVIEW ALL MEDS BY PRESCRIBER/CLIN PHARMACIST DOCUMENTED: ICD-10-PCS | Mod: CPTII,95,, | Performed by: NURSE PRACTITIONER

## 2023-09-14 ENCOUNTER — TELEPHONE (OUTPATIENT)
Dept: FAMILY MEDICINE | Facility: CLINIC | Age: 28
End: 2023-09-14
Payer: MEDICAID

## 2023-10-12 ENCOUNTER — PROCEDURE VISIT (OUTPATIENT)
Dept: NEUROLOGY | Facility: CLINIC | Age: 28
End: 2023-10-12
Payer: MEDICAID

## 2023-10-12 VITALS
HEIGHT: 62 IN | OXYGEN SATURATION: 98 % | DIASTOLIC BLOOD PRESSURE: 95 MMHG | SYSTOLIC BLOOD PRESSURE: 137 MMHG | HEART RATE: 95 BPM | WEIGHT: 260 LBS | BODY MASS INDEX: 47.84 KG/M2

## 2023-10-12 DIAGNOSIS — F45.8 BRUXISM: ICD-10-CM

## 2023-10-12 DIAGNOSIS — G43.711 INTRACTABLE CHRONIC MIGRAINE WITHOUT AURA AND WITH STATUS MIGRAINOSUS: Primary | ICD-10-CM

## 2023-10-12 DIAGNOSIS — G24.4 OROMANDIBULAR DYSTONIA: ICD-10-CM

## 2023-10-12 PROCEDURE — 64615 CHEMODENERV MUSC MIGRAINE: CPT | Mod: PBBFAC | Performed by: PSYCHIATRY & NEUROLOGY

## 2023-10-12 PROCEDURE — 64615 PR CHEMODENERVATION OF MUSCLE FOR CHRONIC MIGRAINE: ICD-10-PCS | Mod: S$PBB,,, | Performed by: PSYCHIATRY & NEUROLOGY

## 2023-10-12 PROCEDURE — 64615 CHEMODENERV MUSC MIGRAINE: CPT | Mod: S$PBB,,, | Performed by: PSYCHIATRY & NEUROLOGY

## 2023-10-12 RX ADMIN — ONABOTULINUMTOXINA 200 UNITS: 200 INJECTION, POWDER, LYOPHILIZED, FOR SOLUTION INTRADERMAL; INTRAMUSCULAR at 11:10

## 2023-10-12 NOTE — PROCEDURES
[facility] Neurology Outpatient Botox Procedure Note    History of Present Illness     This is 27 y.o. female with history of morbid obesity, Chiari malformation type 1, anxiety, PCOS, insomnia, hypertension who is referred for chronic migraine without aura with bruxism.  Patient is here for Botox 2.     Age of Onset : 10 years old     Headache Description:   occipital, paracervical, stabbing, throbbing, pressure sensation, moderate to severe, lasting >24 hours to 7 days, with nausea, with photophobia and phonophobia.   Left cheek, lower jaw, gum numbness and occasional periorbital throbbing pain.  Also reports jaw clicking sensation at times.     Baseline Headache Frequency: 15 migraine headache days per month for the past 3 years.   Interval Headache Frequency:  4-8 migraine headache days per month     Current Prophylactic  Erenumab 70 mg once per month (12/12/2022 to present)  Lisinopril 30 mg daily  Celexa 40 mg daily  Gabapentin 300 mg 3 times a day (12/20/2022 to present)     Current Abortive  Ubrogepant 100 mg twice a day as needed (12/12/2022 to present): effective.   Cyclobenzaprine     Review of System      reviewed. stable.    Focused Exam     Reviewed.  Stable.    Assessment     This is 27 y.o. female with history of morbid obesity, Chiari malformation type 1, anxiety, PCOS, insomnia, hypertension who is referred for chronic migraine without aura with bruxism.  Patient is here for Botox 2.     Procedure     Date of procedure: 10/12/2023    Procedure: Chronic Migraine Chemodenervation with Botulinum toxin    The patient was identified and informed consent was reviewed with the patient, and we discussed the risks, benefits and alternatives. Specifically, we discussed the risks of bleeding, infection and nerve injury with worsened pain and function. Specifically, we discussed the most frequently reported adverse reactions following injection of BOTOX include headache (5%), eyelid ptosis (4%), muscular weakness  (4%), bronchitis (3%), injection-site pain (3%), musculoskeletal pain (3%), myalgia (3%), facial paresis (2%), hypertension (2%), and muscle spasms (2%). The patient verbalized an understanding of these risks and the symptoms and the potentially catastrophic consequences of this occurrence. The patient verbalized an understanding that if she should begin to have these symptoms that she should immediately go to the nearest emergency room for evaluation. The patient was then positioned. The injection sites were identified and was prepped with Alcohol. 4cc of preservative free normal saline was mixed with 200 units of Botox. A 30-gauge, 0.5 inch needle was then used to inject a total 155 units of Botox. Muscles injected as below. Patient tolerated the procedure well with no complaints.    A. : Botox dosage: 10 units in 2 sites Right: 5 Left: 5   B. Procerus Botox dosage: 5 Units in 1 site   C. Frontalis Botox dosage: 20 Units divided in 4 sites Right: 10 Left: 10   D. Temporalis Botox dosage: 40 Units divided in 8 sites Right: 20 Left: 20   E. Occipitalis Botox dosage 30 Units divided in 6 sites Right: 15 Left: 15   F. Cervical Paraspinal Botox dosage: 20 Units divided in 4 sites: Right 10 Left: 10   G. Trapezius Botox dosage: 30 Units divided in 6 sites: Right: 15 Left: 15   H. Masseter Botox dosage: 10 Units divided in 2 sites: Right: 5 Left: 5     Total Units Injected: 165 units   Total Units discarded: 35 units     Follow Up       RTC 2 Months   RTC Botox      Mary Fried MD   [facility] General Neurology

## 2023-10-27 ENCOUNTER — HOSPITAL ENCOUNTER (EMERGENCY)
Facility: HOSPITAL | Age: 28
Discharge: HOME | End: 2023-10-27
Attending: FAMILY MEDICINE
Payer: MEDICAID

## 2023-10-27 VITALS
HEART RATE: 85 BPM | OXYGEN SATURATION: 99 % | BODY MASS INDEX: 48.33 KG/M2 | WEIGHT: 256 LBS | TEMPERATURE: 98 F | DIASTOLIC BLOOD PRESSURE: 96 MMHG | HEIGHT: 61 IN | SYSTOLIC BLOOD PRESSURE: 143 MMHG | RESPIRATION RATE: 17 BRPM

## 2023-10-27 DIAGNOSIS — K29.00 ACUTE GASTRITIS, PRESENCE OF BLEEDING UNSPECIFIED, UNSPECIFIED GASTRITIS TYPE: Primary | ICD-10-CM

## 2023-10-27 LAB
ABS NEUT CALC (OHS): 3.58 X10(3)/MCL (ref 2.1–9.2)
ALBUMIN SERPL-MCNC: 4.3 G/DL (ref 3.4–5)
ALBUMIN/GLOB SERPL: 1.3 RATIO
ALP SERPL-CCNC: 68 UNIT/L (ref 50–144)
ALT SERPL-CCNC: 24 UNIT/L (ref 1–45)
ANION GAP SERPL CALC-SCNC: 8 MEQ/L (ref 2–13)
APPEARANCE UR: CLEAR
AST SERPL-CCNC: 28 UNIT/L (ref 14–36)
B-HCG SERPL QL: NEGATIVE
BILIRUB SERPL-MCNC: 0.5 MG/DL (ref 0–1)
BILIRUB UR QL STRIP.AUTO: NEGATIVE
BUN SERPL-MCNC: 11 MG/DL (ref 7–20)
CALCIUM SERPL-MCNC: 9.3 MG/DL (ref 8.4–10.2)
CHLORIDE SERPL-SCNC: 104 MMOL/L (ref 98–110)
CO2 SERPL-SCNC: 27 MMOL/L (ref 21–32)
COLOR UR AUTO: YELLOW
CREAT SERPL-MCNC: 0.7 MG/DL (ref 0.66–1.25)
CREAT/UREA NIT SERPL: 16 (ref 12–20)
EOSINOPHIL NFR BLD MANUAL: 0.06 X10(3)/MCL (ref 0–0.9)
EOSINOPHIL NFR BLD MANUAL: 1 % (ref 0–3)
ERYTHROCYTE [DISTWIDTH] IN BLOOD BY AUTOMATED COUNT: 14.1 % (ref 11–14.5)
GFR SERPLBLD CREATININE-BSD FMLA CKD-EPI: >90 MLS/MIN/1.73/M2
GLOBULIN SER-MCNC: 3.3 GM/DL (ref 2–3.9)
GLUCOSE SERPL-MCNC: 90 MG/DL (ref 70–115)
GLUCOSE UR QL STRIP.AUTO: NEGATIVE
HCT VFR BLD AUTO: 40.7 % (ref 36–48)
HGB BLD-MCNC: 13.8 G/DL (ref 11.8–16)
KETONES UR QL STRIP.AUTO: NEGATIVE
LEUKOCYTE ESTERASE UR QL STRIP.AUTO: NEGATIVE
LIPASE SERPL-CCNC: 117 U/L (ref 23–300)
LYMPH ABN # BLD MANUAL: 8 %
LYMPHOCYTES NFR BLD MANUAL: 1.55 X10(3)/MCL
LYMPHOCYTES NFR BLD MANUAL: 26 % (ref 20–55)
MCH RBC QN AUTO: 25.1 PG (ref 27–34)
MCHC RBC AUTO-ENTMCNC: 33.9 G/DL (ref 31–37)
MCV RBC AUTO: 74 FL (ref 79–99)
MICROCYTES BLD QL SMEAR: SLIGHT
MONOCYTES NFR BLD MANUAL: 0.3 X10(3)/MCL (ref 0.1–1.3)
MONOCYTES NFR BLD MANUAL: 5 % (ref 0–10)
NEUTROPHILS NFR BLD MANUAL: 60 % (ref 37–73)
NITRITE UR QL STRIP.AUTO: NEGATIVE
NRBC BLD AUTO-RTO: 0 %
PH UR STRIP.AUTO: 6.5 [PH]
PLATELET # BLD AUTO: 265 X10(3)/MCL (ref 140–371)
PLATELET # BLD EST: ADEQUATE 10*3/UL
PMV BLD AUTO: 11.9 FL (ref 9.4–12.4)
POIKILOCYTOSIS BLD QL SMEAR: SLIGHT
POTASSIUM SERPL-SCNC: 3.6 MMOL/L (ref 3.5–5.1)
PROT SERPL-MCNC: 7.6 GM/DL (ref 6.3–8.2)
PROT UR QL STRIP.AUTO: NEGATIVE
RBC # BLD AUTO: 5.5 X10(6)/MCL (ref 4–5.1)
RBC #/AREA URNS AUTO: ABNORMAL /HPF
RBC UR QL AUTO: ABNORMAL
SODIUM SERPL-SCNC: 139 MMOL/L (ref 135–145)
SP GR UR STRIP.AUTO: 1.01 (ref 1–1.03)
SQUAMOUS #/AREA URNS AUTO: ABNORMAL /HPF
UROBILINOGEN UR STRIP-ACNC: 1
WBC # SPEC AUTO: 5.96 X10(3)/MCL (ref 4–11.5)
WBC #/AREA URNS AUTO: ABNORMAL /HPF

## 2023-10-27 PROCEDURE — 96375 TX/PRO/DX INJ NEW DRUG ADDON: CPT

## 2023-10-27 PROCEDURE — 83690 ASSAY OF LIPASE: CPT | Performed by: FAMILY MEDICINE

## 2023-10-27 PROCEDURE — 85027 COMPLETE CBC AUTOMATED: CPT | Performed by: FAMILY MEDICINE

## 2023-10-27 PROCEDURE — 25000003 PHARM REV CODE 250: Performed by: FAMILY MEDICINE

## 2023-10-27 PROCEDURE — 96374 THER/PROPH/DIAG INJ IV PUSH: CPT

## 2023-10-27 PROCEDURE — 99284 EMERGENCY DEPT VISIT MOD MDM: CPT | Mod: 25

## 2023-10-27 PROCEDURE — 80053 COMPREHEN METABOLIC PANEL: CPT | Performed by: FAMILY MEDICINE

## 2023-10-27 PROCEDURE — 81025 URINE PREGNANCY TEST: CPT | Performed by: FAMILY MEDICINE

## 2023-10-27 PROCEDURE — 81001 URINALYSIS AUTO W/SCOPE: CPT | Performed by: FAMILY MEDICINE

## 2023-10-27 PROCEDURE — 96361 HYDRATE IV INFUSION ADD-ON: CPT

## 2023-10-27 PROCEDURE — 63600175 PHARM REV CODE 636 W HCPCS: Performed by: FAMILY MEDICINE

## 2023-10-27 RX ORDER — ONDANSETRON 2 MG/ML
4 INJECTION INTRAMUSCULAR; INTRAVENOUS
Status: COMPLETED | OUTPATIENT
Start: 2023-10-27 | End: 2023-10-27

## 2023-10-27 RX ORDER — PROMETHAZINE HYDROCHLORIDE 25 MG/1
25 TABLET ORAL EVERY 6 HOURS PRN
Qty: 15 TABLET | Refills: 0 | Status: SHIPPED | OUTPATIENT
Start: 2023-10-27 | End: 2024-02-21

## 2023-10-27 RX ORDER — KETOROLAC TROMETHAMINE 30 MG/ML
15 INJECTION, SOLUTION INTRAMUSCULAR; INTRAVENOUS
Status: COMPLETED | OUTPATIENT
Start: 2023-10-27 | End: 2023-10-27

## 2023-10-27 RX ORDER — OMEPRAZOLE 20 MG/1
20 CAPSULE, DELAYED RELEASE ORAL DAILY
Qty: 15 CAPSULE | Refills: 0 | Status: SHIPPED | OUTPATIENT
Start: 2023-10-27 | End: 2023-12-12

## 2023-10-27 RX ADMIN — SODIUM CHLORIDE 1000 ML: 9 INJECTION, SOLUTION INTRAVENOUS at 10:10

## 2023-10-27 RX ADMIN — KETOROLAC TROMETHAMINE 15 MG: 30 INJECTION, SOLUTION INTRAMUSCULAR; INTRAVENOUS at 10:10

## 2023-10-27 RX ADMIN — ONDANSETRON 4 MG: 2 INJECTION INTRAMUSCULAR; INTRAVENOUS at 10:10

## 2023-10-27 NOTE — ED PROVIDER NOTES
Encounter Date: 10/27/2023       History     Chief Complaint   Patient presents with    Abdominal Pain    Vomiting    Diarrhea     C/o abd pain, vomiting, diarrhea, decreased appetite x 2 weeks     Patient presents to the emergency room with about a 2 week history of epigastric pain on and off.  She has had some nausea and a few episodes of vomiting.    The history is provided by the patient.     Review of patient's allergies indicates:   Allergen Reactions    Codeine Anxiety, Itching, Nausea Only, Rash and Shortness Of Breath     Other reaction(s): Other (See Comments)  headaches       Past Medical History:   Diagnosis Date    Acute angle-closure glaucoma     bilateral    Asthma     Cervical radiculopathy     Family history of diabetes mellitus     Fibromyalgia     BRITNEY (generalized anxiety disorder)     Hypertension     Insomnia     Migraine headache     Ovarian cyst     PCOS (polycystic ovarian syndrome)      Past Surgical History:   Procedure Laterality Date     SECTION  2018     Family History   Problem Relation Age of Onset    Melanoma Maternal Grandmother     Breast cancer Neg Hx     Cervical cancer Neg Hx     Uterine cancer Neg Hx     Colon cancer Neg Hx      Social History     Tobacco Use    Smoking status: Never    Smokeless tobacco: Never   Substance Use Topics    Alcohol use: Not Currently    Drug use: Never     Review of Systems   Constitutional:  Negative for fever.   HENT:  Negative for sore throat.    Respiratory:  Negative for shortness of breath.    Cardiovascular:  Negative for chest pain.   Gastrointestinal:  Positive for abdominal pain, nausea and vomiting.   Genitourinary:  Negative for dysuria and urgency.   Musculoskeletal:  Negative for back pain.   Skin:  Negative for rash.   Neurological:  Negative for weakness.   Hematological:  Does not bruise/bleed easily.   All other systems reviewed and are negative.      Physical Exam     Initial Vitals [10/27/23 1003]   BP Pulse Resp  Temp SpO2   (!) 156/98 84 20 97.9 °F (36.6 °C) 100 %      MAP       --         Physical Exam    Nursing note and vitals reviewed.  Constitutional: She appears well-developed and well-nourished.   HENT:   Head: Normocephalic and atraumatic.   Eyes: EOM are normal. Pupils are equal, round, and reactive to light.   Neck: Neck supple.   Normal range of motion.  Cardiovascular:  Normal rate, regular rhythm and normal heart sounds.           Pulmonary/Chest: Breath sounds normal.   Abdominal: Abdomen is soft. Bowel sounds are normal. There is abdominal tenderness.   Mild epigastric tenderness. There is no rebound and no guarding.   Musculoskeletal:         General: No edema. Normal range of motion.      Cervical back: Normal range of motion and neck supple.     Neurological: She is alert and oriented to person, place, and time.   Skin: Skin is warm and dry. Capillary refill takes less than 2 seconds.   Psychiatric: She has a normal mood and affect.         ED Course   Procedures  Labs Reviewed   URINALYSIS - Abnormal; Notable for the following components:       Result Value    Blood, UA Trace-Intact (*)     All other components within normal limits    Narrative:      URINE STABILITY IS 2 HOURS AT ROOM TEMP OR    SIX HOURS REFRIGERATED. PERFORMING TESTING ON    SPECIMENS GREATER THAN THIS AGE MAY AFFECT THE    FOLLOWING TESTS:    PH          SPECIFIC GRAVITY           BLOOD    CLARITY     BILIRUBIN               UROBILINOGEN   CBC WITH DIFFERENTIAL - Abnormal; Notable for the following components:    RBC 5.50 (*)     MCV 74.0 (*)     MCH 25.1 (*)     All other components within normal limits   URINALYSIS, MICROSCOPIC - Abnormal; Notable for the following components:    Squamous Epithelial Cells, UA Moderate (*)     All other components within normal limits   HCG QUALITATIVE URINE - Normal   LIPASE - Normal   COMPREHENSIVE METABOLIC PANEL   CBC W/ AUTO DIFFERENTIAL    Narrative:     The following orders were created for  panel order CBC Auto Differential.  Procedure                               Abnormality         Status                     ---------                               -----------         ------                     CBC with Differential[[id number]]       Abnormal            Final result               Manual Differential[[id number]]                             In process                   Please view results for these tests on the individual orders.   MANUAL DIFFERENTIAL          Imaging Results    None          Medications   sodium chloride 0.9% bolus 1,000 mL 1,000 mL (1,000 mLs Intravenous New Bag 10/27/23 1039)   ketorolac injection 15 mg (15 mg Intravenous Given 10/27/23 1039)   ondansetron injection 4 mg (4 mg Intravenous Given 10/27/23 1039)     Medical Decision Making  Differential diagnosis: gastritis, pancreatitis, pregnancy, uti, GERD    After iv zofran, IVF bolus, patient feeling much better.  Dx gastritis, pt says she has appt with her PCP Monday.    Amount and/or Complexity of Data Reviewed  Labs: ordered.    Risk  Prescription drug management.                               Clinical Impression:   Final diagnoses:  [K29.00] Acute gastritis, presence of bleeding unspecified, unspecified gastritis type (Primary)        ED Disposition Condition    Discharge Stable          ED Prescriptions       Medication Sig Dispense Start Date End Date Auth. Provider    promethazine (PHENERGAN) 25 MG tablet Take 1 tablet (25 mg total) by mouth every 6 (six) hours as needed for Nausea. 15 tablet 10/27/2023 -- Rupert Spence MD    omeprazole (PRILOSEC) 20 MG capsule Take 1 capsule (20 mg total) by mouth once daily. 15 capsule 10/27/2023 -- Rupert Spence MD          Follow-up Information       Follow up With Specialties Details Why Contact Info    Florencia Em, FNP-C Family Medicine  keep Monday appt [address]  [address]  [place] [address]  [phone]               Rupert Spence MD  10/27/23 5883

## 2023-10-27 NOTE — DISCHARGE INSTRUCTIONS
Add famotidine (Pepcid) available over the counter twice a day for a few weeks in addition to the prescription medications.  The combination will help.

## 2023-10-30 ENCOUNTER — OFFICE VISIT (OUTPATIENT)
Dept: FAMILY MEDICINE | Facility: CLINIC | Age: 28
End: 2023-10-30
Payer: MEDICAID

## 2023-10-30 VITALS
BODY MASS INDEX: 49.84 KG/M2 | HEART RATE: 100 BPM | TEMPERATURE: 97 F | DIASTOLIC BLOOD PRESSURE: 88 MMHG | WEIGHT: 264 LBS | OXYGEN SATURATION: 99 % | HEIGHT: 61 IN | SYSTOLIC BLOOD PRESSURE: 138 MMHG

## 2023-10-30 DIAGNOSIS — J30.2 SEASONAL ALLERGIES: ICD-10-CM

## 2023-10-30 DIAGNOSIS — R10.11 COLICKY RUQ ABDOMINAL PAIN: Primary | ICD-10-CM

## 2023-10-30 PROBLEM — E28.2 POLYCYSTIC OVARY SYNDROME: Status: ACTIVE | Noted: 2022-12-01

## 2023-10-30 PROBLEM — G93.5 ARNOLD-CHIARI MALFORMATION, TYPE I: Status: ACTIVE | Noted: 2023-10-30

## 2023-10-30 PROBLEM — E55.9 VITAMIN D DEFICIENCY: Status: ACTIVE | Noted: 2022-12-01

## 2023-10-30 PROBLEM — G47.00 INSOMNIA: Status: ACTIVE | Noted: 2023-10-30

## 2023-10-30 PROBLEM — M54.12 CERVICAL RADICULOPATHY: Status: ACTIVE | Noted: 2022-12-01

## 2023-10-30 PROBLEM — J45.909 ASTHMA: Status: ACTIVE | Noted: 2022-12-01

## 2023-10-30 PROBLEM — Z83.3 FAMILY HISTORY OF DIABETES MELLITUS: Status: ACTIVE | Noted: 2023-10-30

## 2023-10-30 PROBLEM — F41.1 GENERALIZED ANXIETY DISORDER: Status: ACTIVE | Noted: 2022-12-01

## 2023-10-30 PROBLEM — E78.00 HIGH CHOLESTEROL: Status: ACTIVE | Noted: 2022-12-01

## 2023-10-30 PROBLEM — N83.209 CYST OF OVARY: Status: ACTIVE | Noted: 2022-12-01

## 2023-10-30 PROBLEM — H40.213 ACUTE ANGLE-CLOSURE GLAUCOMA OF BOTH EYES: Status: ACTIVE | Noted: 2022-12-01

## 2023-10-30 PROBLEM — K21.9 GASTROESOPHAGEAL REFLUX DISEASE: Status: ACTIVE | Noted: 2022-12-01

## 2023-10-30 PROCEDURE — 99214 OFFICE O/P EST MOD 30 MIN: CPT | Mod: ,,, | Performed by: NURSE PRACTITIONER

## 2023-10-30 PROCEDURE — 4010F ACE/ARB THERAPY RXD/TAKEN: CPT | Mod: CPTII,,, | Performed by: NURSE PRACTITIONER

## 2023-10-30 PROCEDURE — 3075F PR MOST RECENT SYSTOLIC BLOOD PRESS GE 130-139MM HG: ICD-10-PCS | Mod: CPTII,,, | Performed by: NURSE PRACTITIONER

## 2023-10-30 PROCEDURE — 3044F PR MOST RECENT HEMOGLOBIN A1C LEVEL <7.0%: ICD-10-PCS | Mod: CPTII,,, | Performed by: NURSE PRACTITIONER

## 2023-10-30 PROCEDURE — 1160F RVW MEDS BY RX/DR IN RCRD: CPT | Mod: CPTII,,, | Performed by: NURSE PRACTITIONER

## 2023-10-30 PROCEDURE — 4010F PR ACE/ARB THEARPY RXD/TAKEN: ICD-10-PCS | Mod: CPTII,,, | Performed by: NURSE PRACTITIONER

## 2023-10-30 PROCEDURE — 1159F MED LIST DOCD IN RCRD: CPT | Mod: CPTII,,, | Performed by: NURSE PRACTITIONER

## 2023-10-30 PROCEDURE — 3044F HG A1C LEVEL LT 7.0%: CPT | Mod: CPTII,,, | Performed by: NURSE PRACTITIONER

## 2023-10-30 PROCEDURE — 3079F DIAST BP 80-89 MM HG: CPT | Mod: CPTII,,, | Performed by: NURSE PRACTITIONER

## 2023-10-30 PROCEDURE — 1159F PR MEDICATION LIST DOCUMENTED IN MEDICAL RECORD: ICD-10-PCS | Mod: CPTII,,, | Performed by: NURSE PRACTITIONER

## 2023-10-30 PROCEDURE — 3079F PR MOST RECENT DIASTOLIC BLOOD PRESSURE 80-89 MM HG: ICD-10-PCS | Mod: CPTII,,, | Performed by: NURSE PRACTITIONER

## 2023-10-30 PROCEDURE — 99214 PR OFFICE/OUTPT VISIT, EST, LEVL IV, 30-39 MIN: ICD-10-PCS | Mod: ,,, | Performed by: NURSE PRACTITIONER

## 2023-10-30 PROCEDURE — 3075F SYST BP GE 130 - 139MM HG: CPT | Mod: CPTII,,, | Performed by: NURSE PRACTITIONER

## 2023-10-30 PROCEDURE — 3008F BODY MASS INDEX DOCD: CPT | Mod: CPTII,,, | Performed by: NURSE PRACTITIONER

## 2023-10-30 PROCEDURE — 3008F PR BODY MASS INDEX (BMI) DOCUMENTED: ICD-10-PCS | Mod: CPTII,,, | Performed by: NURSE PRACTITIONER

## 2023-10-30 PROCEDURE — 1160F PR REVIEW ALL MEDS BY PRESCRIBER/CLIN PHARMACIST DOCUMENTED: ICD-10-PCS | Mod: CPTII,,, | Performed by: NURSE PRACTITIONER

## 2023-10-30 NOTE — PROGRESS NOTES
Patient ID: Princess Colindres  : 1995    Chief Complaint: ENT  and Asthma    Allergies: Patient is allergic to codeine.     History of Present Illness:  The patient is a 27 y.o.    Black or   White female who presents to clinic for follow up on ENT  and Asthma     Complains of increase in allergy type symptoms recently; itchy, watery eyes, itchy ears, chronic sinus issues. She is enquiring about allergy testing.     Also having some issue with abdominal pain, upper abdomen and sometime between shoulder blades. This has been intermittent over the last few weeks. Exacerbated by any food intake at this time; food increases pain, causes nausea, occasional vomiting and diarrhea. She did go to the ER and was prescribed medications for there treatment of GERD but that has not helped. She has never had issue with her gallbladder previously.     Social History:  reports that she has never smoked. She has never used smokeless tobacco. She reports that she does not currently use alcohol. She reports that she does not use drugs.    Past Medical History:  has a past medical history of Acute angle-closure glaucoma, Asthma, Cervical radiculopathy, Family history of diabetes mellitus, Fibromyalgia, BRITNEY (generalized anxiety disorder), Hypertension, Insomnia, Migraine headache, Ovarian cyst, and PCOS (polycystic ovarian syndrome).    Current Medications:  Current Outpatient Medications   Medication Instructions    citalopram (CELEXA) 40 mg, Oral, Daily    cloNIDine (CATAPRES) 0.3 mg, Oral, Nightly    cyclobenzaprine (FLEXERIL) 10 mg, Oral, 2 times daily PRN    ergocalciferol (ERGOCALCIFEROL) 50,000 Units, Oral, Every 7 days    gabapentin (NEURONTIN) 300 mg, Oral, 3 times daily    hydroCHLOROthiazide (HYDRODIURIL) 12.5 mg, Oral, Daily    lisinopriL (PRINIVIL,ZESTRIL) 30 mg, Oral, Daily    naproxen sodium (ANAPROX) 220 mg, Oral, Daily PRN    omeprazole (PRILOSEC) 20 mg, Oral, Daily    promethazine (PHENERGAN) 25  "mg, Oral, Every 6 hours PRN    UBRELVY 100 mg, Oral, Daily PRN    zolpidem (AMBIEN) 10 mg, Oral, Nightly PRN       Review of Systems   See HPI    Visit Vitals  /88 (BP Location: Right arm)   Pulse 100   Temp 97.2 °F (36.2 °C) (Temporal)   Ht 5' 1" (1.549 m)   Wt 119.7 kg (264 lb)   LMP  (LMP Unknown)   SpO2 99%   BMI 49.88 kg/m²       Physical Exam  Constitutional:       General: She is not in acute distress.     Appearance: Normal appearance. She is not ill-appearing.   Eyes:      Conjunctiva/sclera: Conjunctivae normal.   Cardiovascular:      Heart sounds: Normal heart sounds.   Pulmonary:      Breath sounds: Normal breath sounds.   Abdominal:      General: Bowel sounds are normal.      Palpations: Abdomen is soft.      Tenderness: There is abdominal tenderness. There is no guarding (RUQ tenderness).   Skin:     General: Skin is warm and dry.          Labs Reviewed:  Chemistry:  Lab Results   Component Value Date     10/27/2023    K 3.6 10/27/2023    CHLORIDE 104 10/27/2023    BUN 11.0 10/27/2023    CREATININE 0.70 10/27/2023    EGFRNORACEVR >90 10/27/2023    GLUCOSE 90 10/27/2023    CALCIUM 9.3 10/27/2023    ALKPHOS 68 10/27/2023    LABPROT 7.6 10/27/2023    ALBUMIN 4.3 10/27/2023    AST 28 10/27/2023    ALT 24 10/27/2023    [id number] 28.00 (L) 12/15/2021    TSH 0.155 (L) 06/28/2023    [id number] 0.98 06/28/2023        Hematology:  Lab Results   Component Value Date    WBC 5.96 10/27/2023    RBC 5.50 (H) 10/27/2023    HGB 13.8 10/27/2023    HCT 40.7 10/27/2023    MCV 74.0 (L) 10/27/2023    MCH 25.1 (L) 10/27/2023    MCHC 33.9 10/27/2023    RDW 14.1 10/27/2023     10/27/2023    MPV 11.9 10/27/2023         Assessment & Plan:  1. Colicky RUQ abdominal pain  Comments:  US abdomen/gallbladder to assess for gallstones.   Advised BRAT diet  Refer to Dr Moreno if indicated.   Orders:  -     US Abdomen Limited; Future; Expected date: 10/30/2023    2. Seasonal allergies  -     Ambulatory " referral/consult to Allergy; Future; Expected date: 11/06/2023           Future Appointments   Date Time Provider Department Center   11/9/2023 11:10 AM Emily Leon MD [facility] O[facility] [place] [facility]   11/20/2023  9:10 AM LAB, [facility] LABORATORY DRAW STATION [facility] LUCY [place] [facility]   11/28/2023  2:00 PM Florencia Em FNP-C [facility] [place] [facility]   12/12/2023 10:00 AM Elsa Schmidt, ANP [facility] NEURO [place] [facility]   1/11/2024  9:00 AM BOTOX, [facility] NEUROLOGY [facility] NE[facility] [place] [facility]       Follow up if symptoms worsen or fail to improve. Call sooner if needed.    GAYATRI Burrows    Lab Frequency Next Occurrence   Glucose Tolerance, 2 Hours Once 06/14/2023

## 2023-11-02 ENCOUNTER — HOSPITAL ENCOUNTER (OUTPATIENT)
Dept: RADIOLOGY | Facility: HOSPITAL | Age: 28
Discharge: HOME | End: 2023-11-02
Attending: NURSE PRACTITIONER
Payer: MEDICAID

## 2023-11-02 DIAGNOSIS — R10.11 COLICKY RUQ ABDOMINAL PAIN: ICD-10-CM

## 2023-11-02 PROCEDURE — 76705 ECHO EXAM OF ABDOMEN: CPT | Mod: TC

## 2023-11-03 ENCOUNTER — PATIENT MESSAGE (OUTPATIENT)
Dept: FAMILY MEDICINE | Facility: CLINIC | Age: 28
End: 2023-11-03
Payer: MEDICAID

## 2023-11-03 DIAGNOSIS — K29.00 ACUTE GASTRITIS WITHOUT HEMORRHAGE, UNSPECIFIED GASTRITIS TYPE: Primary | ICD-10-CM

## 2023-11-03 RX ORDER — SUCRALFATE 1 G/1
1 TABLET ORAL 4 TIMES DAILY
Qty: 40 TABLET | Refills: 0 | Status: SHIPPED | OUTPATIENT
Start: 2023-11-03 | End: 2023-11-13

## 2023-11-06 DIAGNOSIS — R71.8 MICROCYTIC RED BLOOD CELLS: Primary | ICD-10-CM

## 2023-11-07 ENCOUNTER — TELEPHONE (OUTPATIENT)
Dept: FAMILY MEDICINE | Facility: CLINIC | Age: 28
End: 2023-11-07
Payer: MEDICAID

## 2023-11-07 NOTE — TELEPHONE ENCOUNTER
Please call her to schedule a fasting lab appointment. She had some abnormal labs so Florencia ordered additional labs. She also said that they can draw her wellness labs at the same time. Her wellness labs are scheduled on 11/20 so that appointment can be cancelled. She is aware that someone will be calling her to schedule.

## 2023-11-08 PROCEDURE — 85025 COMPLETE CBC W/AUTO DIFF WBC: CPT | Performed by: NURSE PRACTITIONER

## 2023-11-08 PROCEDURE — 85045 AUTOMATED RETICULOCYTE COUNT: CPT | Performed by: NURSE PRACTITIONER

## 2023-11-08 PROCEDURE — 83540 ASSAY OF IRON: CPT | Performed by: NURSE PRACTITIONER

## 2023-11-08 PROCEDURE — 82728 ASSAY OF FERRITIN: CPT | Performed by: NURSE PRACTITIONER

## 2023-11-20 ENCOUNTER — TELEPHONE (OUTPATIENT)
Dept: FAMILY MEDICINE | Facility: CLINIC | Age: 28
End: 2023-11-20
Payer: MEDICAID

## 2023-11-20 DIAGNOSIS — D75.1 ERYTHROCYTOSIS: Primary | ICD-10-CM

## 2023-11-29 ENCOUNTER — LAB VISIT (OUTPATIENT)
Dept: LAB | Facility: HOSPITAL | Age: 28
End: 2023-11-29
Attending: NURSE PRACTITIONER
Payer: MEDICAID

## 2023-11-29 DIAGNOSIS — D75.1 ERYTHROCYTOSIS: ICD-10-CM

## 2023-11-29 PROCEDURE — 83020 HEMOGLOBIN ELECTROPHORESIS: CPT

## 2023-11-29 PROCEDURE — 83020 HEMOGLOBIN ELECTROPHORESIS: CPT | Mod: 91

## 2023-11-29 PROCEDURE — 36415 COLL VENOUS BLD VENIPUNCTURE: CPT

## 2023-12-01 LAB
HGB A MFR BLD ELPH: 63.1 % (ref 95.8–98)
HGB A2 MFR BLD ELPH: 3.4 % (ref 2–3.3)
HGB F MFR BLD ELPH: 0 % (ref 0–0.9)
HGB FRACT BLD ELPH-IMP: ABNORMAL
HGB FRACT BLD ELPH-IMP: NORMAL
HGB XXX MFR BLD ELPH: ABNORMAL %
M HPLC HB VARIANT, B: ABNORMAL
PROVIDER SIGNING NAME: NORMAL

## 2023-12-04 ENCOUNTER — OFFICE VISIT (OUTPATIENT)
Dept: FAMILY MEDICINE | Facility: CLINIC | Age: 28
End: 2023-12-04
Payer: MEDICAID

## 2023-12-04 VITALS
BODY MASS INDEX: 49.35 KG/M2 | HEART RATE: 103 BPM | SYSTOLIC BLOOD PRESSURE: 130 MMHG | WEIGHT: 261.38 LBS | TEMPERATURE: 97 F | DIASTOLIC BLOOD PRESSURE: 78 MMHG | HEIGHT: 61 IN | OXYGEN SATURATION: 100 %

## 2023-12-04 DIAGNOSIS — F32.A DEPRESSION, UNSPECIFIED DEPRESSION TYPE: ICD-10-CM

## 2023-12-04 DIAGNOSIS — R53.83 PROFOUND FATIGUE: ICD-10-CM

## 2023-12-04 DIAGNOSIS — D75.1 ERYTHROCYTOSIS: Primary | ICD-10-CM

## 2023-12-04 PROCEDURE — 3044F PR MOST RECENT HEMOGLOBIN A1C LEVEL <7.0%: ICD-10-PCS | Mod: CPTII,,, | Performed by: NURSE PRACTITIONER

## 2023-12-04 PROCEDURE — 3078F PR MOST RECENT DIASTOLIC BLOOD PRESSURE < 80 MM HG: ICD-10-PCS | Mod: CPTII,,, | Performed by: NURSE PRACTITIONER

## 2023-12-04 PROCEDURE — 3044F HG A1C LEVEL LT 7.0%: CPT | Mod: CPTII,,, | Performed by: NURSE PRACTITIONER

## 2023-12-04 PROCEDURE — 4010F ACE/ARB THERAPY RXD/TAKEN: CPT | Mod: CPTII,,, | Performed by: NURSE PRACTITIONER

## 2023-12-04 PROCEDURE — 1160F RVW MEDS BY RX/DR IN RCRD: CPT | Mod: CPTII,,, | Performed by: NURSE PRACTITIONER

## 2023-12-04 PROCEDURE — 3078F DIAST BP <80 MM HG: CPT | Mod: CPTII,,, | Performed by: NURSE PRACTITIONER

## 2023-12-04 PROCEDURE — 3075F SYST BP GE 130 - 139MM HG: CPT | Mod: CPTII,,, | Performed by: NURSE PRACTITIONER

## 2023-12-04 PROCEDURE — 3075F PR MOST RECENT SYSTOLIC BLOOD PRESS GE 130-139MM HG: ICD-10-PCS | Mod: CPTII,,, | Performed by: NURSE PRACTITIONER

## 2023-12-04 PROCEDURE — 1159F MED LIST DOCD IN RCRD: CPT | Mod: CPTII,,, | Performed by: NURSE PRACTITIONER

## 2023-12-04 PROCEDURE — 3008F BODY MASS INDEX DOCD: CPT | Mod: CPTII,,, | Performed by: NURSE PRACTITIONER

## 2023-12-04 PROCEDURE — 4010F PR ACE/ARB THEARPY RXD/TAKEN: ICD-10-PCS | Mod: CPTII,,, | Performed by: NURSE PRACTITIONER

## 2023-12-04 PROCEDURE — 3008F PR BODY MASS INDEX (BMI) DOCUMENTED: ICD-10-PCS | Mod: CPTII,,, | Performed by: NURSE PRACTITIONER

## 2023-12-04 PROCEDURE — 1160F PR REVIEW ALL MEDS BY PRESCRIBER/CLIN PHARMACIST DOCUMENTED: ICD-10-PCS | Mod: CPTII,,, | Performed by: NURSE PRACTITIONER

## 2023-12-04 PROCEDURE — 1159F PR MEDICATION LIST DOCUMENTED IN MEDICAL RECORD: ICD-10-PCS | Mod: CPTII,,, | Performed by: NURSE PRACTITIONER

## 2023-12-04 PROCEDURE — 99214 PR OFFICE/OUTPT VISIT, EST, LEVL IV, 30-39 MIN: ICD-10-PCS | Mod: ,,, | Performed by: NURSE PRACTITIONER

## 2023-12-04 PROCEDURE — 99214 OFFICE O/P EST MOD 30 MIN: CPT | Mod: ,,, | Performed by: NURSE PRACTITIONER

## 2023-12-04 RX ORDER — CARIPRAZINE 1.5 MG/1
1.5 CAPSULE, GELATIN COATED ORAL DAILY
Qty: 14 CAPSULE | Refills: 0 | COMMUNITY
Start: 2023-12-04 | End: 2023-12-12 | Stop reason: SDUPTHER

## 2023-12-04 NOTE — PROGRESS NOTES
"Patient ID: Princess Colindres  : 1995    Chief Complaint: Follow-up and Results (Lab results)    Allergies: Patient is allergic to codeine.     History of Present Illness:  The patient is a 27 y.o.    Black or   White female who presents to clinic for follow up on Follow-up and Results (Lab results)     Here to f/u on abnormal labs. Was seen at the local ER the end of October with complains of of colicky abdominal pain that has since resolved.  Routine labs were drawn at that time and she was found to have a mild elevation of the RBC count (5.50).  Red blood cells were still elevated on repeat (5.44), iron studies were done and all within normal limits except a very mild decrease in iron saturation at 19%.  Retic count was decreased.  Peripheral smear revealed microcytosis with hypochromasia.  Hemoglobin electrophoresis also showed some abnormalities (HbA decreased 63.1%, HbF normal 0.0, HbA2 increased 3.4%, Variant 1 33.5 HbC).   Denies any family history of blood disorders. She has never been anemic.     She now reports profound fatigue despite feeling that she sleeps well at night.  She denies that she snores and she has no concern for sleep apnea. A sleep study was ordered and she is willing to do the study.  At this point in time she states that she is becoming depressed.  She was placed on Celexa in the remote past, she claims it was primarily to treat anxiety more so than depression.  Her current depression she believes a stemming from the fact that she is "sick and tired of feeling sick and tired".    She previously suffered from terrible migraines that were not controlled despite multiple medications.  Subsequently a CT revealed a Chiari malformation type 1.  She was referred to Neurology and is now getting Botox injections.  She tells me she is had no more issues with migraines.    Social History:  reports that she has never smoked. She has never used smokeless tobacco. She reports " "that she does not currently use alcohol. She reports that she does not use drugs.    Past Medical History:  has a past medical history of Acute angle-closure glaucoma, Asthma, Cervical radiculopathy, Family history of diabetes mellitus, Fibromyalgia, BRITNEY (generalized anxiety disorder), Hypertension, Insomnia, Migraine headache, Ovarian cyst, and PCOS (polycystic ovarian syndrome).    Current Medications:  Current Outpatient Medications   Medication Instructions    citalopram (CELEXA) 40 mg, Oral, Daily    cloNIDine (CATAPRES) 0.3 mg, Oral, Nightly    cyclobenzaprine (FLEXERIL) 10 mg, Oral, 2 times daily PRN    gabapentin (NEURONTIN) 300 mg, Oral, 3 times daily    hydroCHLOROthiazide (HYDRODIURIL) 12.5 mg, Oral, Daily    lisinopriL (PRINIVIL,ZESTRIL) 30 mg, Oral, Daily    naproxen sodium (ANAPROX) 220 mg, Oral, Daily PRN    omeprazole (PRILOSEC) 20 mg, Oral, Daily    promethazine (PHENERGAN) 25 mg, Oral, Every 6 hours PRN    UBRELVY 100 mg, Oral, Daily PRN    VRAYLAR 1.5 mg, Oral, Daily    zolpidem (AMBIEN) 10 mg, Oral, Nightly PRN       Review of Systems   See HPI    Visit Vitals  /78 (BP Location: Left arm, Patient Position: Sitting, BP Method: Large (Manual))   Pulse 103   Temp 97 °F (36.1 °C) (Temporal)   Ht 5' 0.98" (1.549 m)   Wt 118.6 kg (261 lb 6.4 oz)   SpO2 100%   BMI 49.42 kg/m²       Physical Exam  Vitals reviewed.   Constitutional:       Appearance: Normal appearance. She is obese.   Eyes:      Conjunctiva/sclera: Conjunctivae normal.   Cardiovascular:      Heart sounds: Normal heart sounds.   Pulmonary:      Breath sounds: Normal breath sounds.   Skin:     General: Skin is warm and dry.   Neurological:      Mental Status: She is oriented to person, place, and time.          Labs Reviewed:  Chemistry:  Lab Results   Component Value Date     10/27/2023    K 3.6 10/27/2023    CHLORIDE 104 10/27/2023    BUN 11.0 10/27/2023    CREATININE 0.70 10/27/2023    EGFRNORACEVR >90 10/27/2023    GLUCOSE " 90 10/27/2023    CALCIUM 9.3 10/27/2023    ALKPHOS 68 10/27/2023    LABPROT 7.6 10/27/2023    ALBUMIN 4.3 10/27/2023    AST 28 10/27/2023    ALT 24 10/27/2023    MRRVKCDF75HP 28.00 (L) 12/15/2021    TSH 0.155 (L) 06/28/2023    UGHMOJ3OSHW 0.98 06/28/2023        Lab Results   Component Value Date    HGBA1C 5.0 06/28/2023        Hematology:  Lab Results   Component Value Date    WBC 5.75 11/08/2023    RBC 5.44 (H) 11/08/2023    HGB 13.4 11/08/2023    HCT 39.1 11/08/2023    MCV 71.9 (L) 11/08/2023    MCH 24.6 (L) 11/08/2023    MCHC 34.3 11/08/2023    RDW 14.0 11/08/2023     11/08/2023    MPV 12.5 (H) 11/08/2023       Lipid Panel:  Lab Results   Component Value Date    CHOL 199 06/28/2023    HDL 52 06/28/2023    DLDL 110.9 (H) 06/28/2023    LDLCALC 137 (H) 12/01/2022    TRIG 99 06/28/2023        Assessment & Plan:  1. Erythrocytosis  Overview:  RBC 5.50, H&H 13.8/40.7, MCV 74.0, MCH 25.1, RDW 14.1, platelets 265 (10/27/2023).    Hematology:  Lab Results   Component Value Date    WBC 5.75 11/08/2023    RBC 5.44 (H) 11/08/2023    HGB 13.4 11/08/2023    HCT 39.1 11/08/2023    MCV 71.9 (L) 11/08/2023    MCH 24.6 (L) 11/08/2023    MCHC 34.3 11/08/2023    RDW 14.0 11/08/2023     11/08/2023    MPV 12.5 (H) 11/08/2023     Iron studies WNL except mildly reduced iron sat at 19%.  Decreased retic (1.08), peripheral smear: microcytosis with hypochromasia.  Abnormal hemoglobin electrophoresis.    Assessment & Plan:  E-consult Hematology for recommendations prior to referral.     Orders:  -     E-Consult to Hemonc    2. Depression, unspecified depression type  Overview:  On Celexa 40 mg daily.     Assessment & Plan:  Continue with Celexa at current dose; add Vraylar 1.5 mg daily (samples given) and will f/u in 2 weeks.     Orders:  -     cariprazine (VRAYLAR) 1.5 mg Cap; Take 1 capsule (1.5 mg total) by mouth once daily.  Dispense: 14 capsule; Refill: 0    3. Profound fatigue  -     E-Consult to Hemonc         Future  Appointments   Date Time Provider Department Center   12/12/2023 10:00 AM Elsa Schmidt, ANP [facility] NEURO [place] [facility]   12/18/2023  2:15 PM Florencia Em F[facility]   1/11/2024  9:00 AM BOTOX, [facility] NEUROLOGY [facility] [place]        Follow up for 2 wk f/u, depression/anxiety. Call sooner if needed.    GAYATRI Burrows    Lab Frequency Next Occurrence   Glucose Tolerance, 2 Hours Once 06/14/2023   Ambulatory referral/consult to Allergy Once 11/06/2023   Ambulatory referral/consult to Sleep Disorders Once 12/05/2023

## 2023-12-05 ENCOUNTER — E-CONSULT (OUTPATIENT)
Dept: HEMATOLOGY/ONCOLOGY | Facility: CLINIC | Age: 28
End: 2023-12-05
Payer: MEDICAID

## 2023-12-05 ENCOUNTER — PATIENT MESSAGE (OUTPATIENT)
Dept: FAMILY MEDICINE | Facility: CLINIC | Age: 28
End: 2023-12-05
Payer: MEDICAID

## 2023-12-05 DIAGNOSIS — D58.2 HEMOGLOBIN C TRAIT: Primary | ICD-10-CM

## 2023-12-05 PROBLEM — G43.711 INTRACTABLE CHRONIC MIGRAINE WITHOUT AURA AND WITH STATUS MIGRAINOSUS: Status: RESOLVED | Noted: 2023-05-25 | Resolved: 2023-12-05

## 2023-12-05 PROBLEM — M77.9: Status: RESOLVED | Noted: 2023-05-25 | Resolved: 2023-12-05

## 2023-12-05 PROBLEM — M54.2 NECK PAIN: Status: RESOLVED | Noted: 2023-01-18 | Resolved: 2023-12-05

## 2023-12-05 PROBLEM — R20.0 BILATERAL HAND NUMBNESS: Status: RESOLVED | Noted: 2023-01-18 | Resolved: 2023-12-05

## 2023-12-05 PROBLEM — G44.59 OTHER COMPLICATED HEADACHE SYNDROME: Status: RESOLVED | Noted: 2023-01-18 | Resolved: 2023-12-05

## 2023-12-05 PROBLEM — N83.209 CYST OF OVARY: Status: RESOLVED | Noted: 2022-12-01 | Resolved: 2023-12-05

## 2023-12-05 PROBLEM — D75.1 ERYTHROCYTOSIS: Status: ACTIVE | Noted: 2023-12-05

## 2023-12-05 PROBLEM — F32.A DEPRESSION: Status: ACTIVE | Noted: 2023-12-05

## 2023-12-05 PROCEDURE — 99451 PR INTERPROF, PHONE/INTERNET/EHR, CONSULT, >= 5MINS: ICD-10-PCS | Mod: ,,, | Performed by: INTERNAL MEDICINE

## 2023-12-05 PROCEDURE — 99451 NTRPROF PH1/NTRNET/EHR 5/>: CPT | Mod: ,,, | Performed by: INTERNAL MEDICINE

## 2023-12-05 NOTE — ASSESSMENT & PLAN NOTE
Continue with Celexa at current dose; add Vraylar 1.5 mg daily (samples given) and will f/u in 2 weeks.

## 2023-12-07 ENCOUNTER — TELEPHONE (OUTPATIENT)
Dept: OBSTETRICS AND GYNECOLOGY | Facility: CLINIC | Age: 28
End: 2023-12-07
Payer: MEDICAID

## 2023-12-07 NOTE — TELEPHONE ENCOUNTER
Attempted to contact, no answer. Left voicemail.     Per KB, elevated RBC has no association with IUD. F/U with PCP concerning labs drawn.

## 2023-12-07 NOTE — TELEPHONE ENCOUNTER
----- Message from Judit Garcia sent at 12/7/2023 10:33 AM CST -----  Regarding: PT Advice  Contact: Patient  Who Called:   PATIENT HAD LABS DONE AND HER RED BLOOD COUNT IS UP. SHE IS ASKING IF COULD  BE FROM HER IUD. IF SO SHE WANTS TO HAVE IT TAKEN OUT. B/C THE COMPLICATION.   Best Call Back Number:Telephone Information:  Mobile          [phone]      [facility] [address] [address]  Phone: [phone] Fax: [phone]

## 2023-12-08 NOTE — TELEPHONE ENCOUNTER
Spoke with patient, informed patient about RBC labs. Verbalized understanding. No further questions or concerns.

## 2023-12-12 ENCOUNTER — OFFICE VISIT (OUTPATIENT)
Dept: NEUROLOGY | Facility: CLINIC | Age: 28
End: 2023-12-12
Payer: MEDICAID

## 2023-12-12 DIAGNOSIS — G43.711 CHRONIC MIGRAINE WITHOUT AURA, INTRACTABLE, WITH STATUS MIGRAINOSUS: Primary | ICD-10-CM

## 2023-12-12 DIAGNOSIS — E66.01 CLASS 3 SEVERE OBESITY DUE TO EXCESS CALORIES WITH SERIOUS COMORBIDITY AND BODY MASS INDEX (BMI) OF 45.0 TO 49.9 IN ADULT: ICD-10-CM

## 2023-12-12 PROCEDURE — 1160F PR REVIEW ALL MEDS BY PRESCRIBER/CLIN PHARMACIST DOCUMENTED: ICD-10-PCS | Mod: CPTII,95,, | Performed by: NURSE PRACTITIONER

## 2023-12-12 PROCEDURE — 3044F PR MOST RECENT HEMOGLOBIN A1C LEVEL <7.0%: ICD-10-PCS | Mod: CPTII,95,, | Performed by: NURSE PRACTITIONER

## 2023-12-12 PROCEDURE — 99443 PR PHYSICIAN TELEPHONE EVALUATION 21-30 MIN: ICD-10-PCS | Mod: 95,,, | Performed by: NURSE PRACTITIONER

## 2023-12-12 PROCEDURE — 3044F HG A1C LEVEL LT 7.0%: CPT | Mod: CPTII,95,, | Performed by: NURSE PRACTITIONER

## 2023-12-12 PROCEDURE — 99443 PR PHYSICIAN TELEPHONE EVALUATION 21-30 MIN: CPT | Mod: 95,,, | Performed by: NURSE PRACTITIONER

## 2023-12-12 PROCEDURE — 4010F ACE/ARB THERAPY RXD/TAKEN: CPT | Mod: CPTII,95,, | Performed by: NURSE PRACTITIONER

## 2023-12-12 PROCEDURE — 4010F PR ACE/ARB THEARPY RXD/TAKEN: ICD-10-PCS | Mod: CPTII,95,, | Performed by: NURSE PRACTITIONER

## 2023-12-12 PROCEDURE — 1160F RVW MEDS BY RX/DR IN RCRD: CPT | Mod: CPTII,95,, | Performed by: NURSE PRACTITIONER

## 2023-12-12 PROCEDURE — 1159F PR MEDICATION LIST DOCUMENTED IN MEDICAL RECORD: ICD-10-PCS | Mod: CPTII,95,, | Performed by: NURSE PRACTITIONER

## 2023-12-12 PROCEDURE — 1159F MED LIST DOCD IN RCRD: CPT | Mod: CPTII,95,, | Performed by: NURSE PRACTITIONER

## 2023-12-12 NOTE — PROGRESS NOTES
[facility] Neurology Audio Only Follow Up Visit Note    Initial Visit Date: 5/25/2023  Last Visit Date: 7/13/2023  Current Visit Date:  12/12/2023    This is a real-time audio only visit that was performed with the originating site at patient's home and the distant site, [facility]. Verbal consent to participate in interactive audio only visit was obtained.    I discussed with the patient regarding the nature of our telehealth visits, that:    - Our sessions are not being recorded and that personal health information is protected  - Provider would evaluate the patient and recommend diagnostics and treatments based on my assessment  - [facility] will provide follow up care in person if/when the patient needs it.     Chief Complaint:     Chief Complaint   Patient presents with    Migraine     Follow up after Botox. Pt states that the first Botox did not make any difference. After the second round of Botox, she had severe pain causing nausea and vomiting- does not want Botox in Jaw again but is okay with it for migraine. Pt states that the Botox has helped with the migraines, only having 1-2 headaches monthly.       History of Present Illness:      This is 27 y.o. female with history of morbid obesity, Chiari malformation type 1, anxiety, PCOS, insomnia, hypertension who was referred headache disorder. Pt was last seen on 10/12/2023 where she received Botox for Migraine. Gabapentin 300mg, Ubrelvy 100mg and Baclofen were continued.Discussed need to wear mouth guard.    Today, Pt states she did not notice a difference to jaw pain after Botox. Does not wish to proceed with this procedure. However, continues to notice a decrease in migraines immediately after Botox #2. Averaging 2 migraines/month. No longer taking Baclofen as it caused N/V. Ubrelvy continues to be effective as abortive therapy. Walking 2 miles daily.    Age of Onset : 10 years  old    Headache Description:   occipital, paracervical, stabbing, throbbing, pressure sensation, moderate to severe, lasting >24 hours to 7 days, with nausea, with photophobia and phonophobia.   Left cheek, lower jaw, gum numbness and occasional periorbital throbbing pain.  Also reports jaw clicking sensation at times.    Frequency: 15 migraine headache days per month for the past 3 years.     Provocation Factors: denied    Risk Factors  - Family history of headache disorder: Yes mom and sister with headaches. Maternal grandfather with headache disorder.   - History of focal CNS lesions: No  - History of CNS infections: No  - History head trauma: No  - History of underlying mood disorder: Yes mood swings.   - History of sleep disorder: Yes not well.  Severe bruxism.  Tends to by through mouth guards when she was young.  Family history of bruxism.   - Recreational drug use: No  - Tobacco use: No  - Alcohol use: No  - Weight fluctuation: Yes morbid obesity.  - Isotretinoin or Tetracycline use:  No  - Family planning and contraceptive use: No    Medications:     Current Prophylactic  Lisinopril 30 mg daily  Celexa 40 mg daily  Gabapentin 300 mg 3 times a day (12/20/2022 to present)    Current Abortive  Ubrogepant 100 mg twice a day as needed (12/12/2022 to present): effective.   Cyclobenzaprine   Tylenol PRN: one day per week  Advil PRN: one day     Prior Prophylactic  Acetazolamide  Topiramate (1/2022): nausea   Amitriptyline (2/2022): worsening depression   Emgality 120 mg once per month: ineffective  Erenumab 70 mg once per month (12/12/2022 - 6/2023) - ineffective    Prior Abortive  Aleve PRN: daily for the past few weeks     Devices:     - VNS:  - TNS  - TMS:     Procedures:     - Botox:  #1 7/13/2023  #2 10/12/2023    - PSG block:   - Occipital nerve block:     Labs:     Results for orders placed or performed in visit on 11/29/23   Hemoglobin Electrophorsis Evaluation, Blood   Result Value Ref Range    Hb  Electrophoresis Interpretation SEE COMMENTS     Hb A 63.1 (L) 95.8 - 98.0 %    Hb F 0.0 0.0 - 0.9 %    Hb A2 3.4 (H) 2.0 - 3.3 %    Variant 1 33.5 Hb C (A) 0.0 %    HPLC Hb Variant, B See Interpretation    Hb Electrophoresis Summary Interpretation   Result Value Ref Range    Hb Electrophoresis Summary Interp SEE COMMENTS     Reviewed By SEE COMMENTS        Studies:     - MRI Brain CSF flow 2/22/2023:  I have reviewed the study independently and with the patient.  Patent flow.  - MRI Brain without contrast 12/6/2022:  I have reviewed the study independently and with the patient. 4 mm tonsillar ectopia.  No crowding.  - MRA Head w/o Juan Luis:   - MRV Head w/o Juan Luis:   - NCHCT:  - Lumbar Puncture:    Review of Systems:     Review of Systems   All other systems reviewed and are negative.  As per HPI    Physical Exams:     There were no vitals filed for this visit.    Physical Exam  Vitals and nursing note reviewed.   HENT:      Head:      Comments: Bilateral masseter tenderness to palpation.  Mild TMJ subluxation on the left.  Pulmonary:      Effort: Pulmonary effort is normal. No respiratory distress.   Neurological:      Mental Status: She is alert.   Psychiatric:         Mood and Affect: Mood normal.     Comprehensive Neurological Exam:  Mental Status: Alert Oriented to Self, Date, and Place. Comprehension wnl. No dysarthria.   CN II - XII: Hearing grossly intact  Motor: unable to assess due to nature of visit   Sensory: unable to assess due to nature of visit   Reflexes: unable to assess due to nature of visit   Cerebellar: unable to assess due to nature of visit  Romberg: unable to assess due to nature of visit  Gait: unable to assess due to nature of visit    Assessment:     This is 27 y.o. female with history of morbid obesity, Chiari malformation type 1, anxiety, PCOS, insomnia, hypertension who was referred for chronic migraine without aura with bruxism and left stylohyaloid tendonitis. Botox not effective for jaw  pian. Effective for migraine and has decrease frequency to 2/month. Walking 2 miles daily.    Problem List Items Addressed This Visit          Neuro    Chronic migraine without aura, intractable, with status migrainosus - Primary       Endocrine    Class 3 severe obesity due to excess calories with serious comorbidity and body mass index (BMI) of 45.0 to 49.9 in adult       Plan:     [] c/w Gabapentin 300 mg 3 times a day via PCP  [] c/w Ubrogepant 100 mg twice a day as needed - triptans are contraindicated in patient with severe hypertension. Via PCP  [] c/w cyclobenzaprine 10mg BID PRN via PCP  [] start exercise program for overall health and wellness    [] Migraine Botox Protocol:  A. : Botox dosage: 10 units in 2 sites Right: 5 Left: 5   B. Procerus Botox dosage: 5 Units in 1 site   C. Frontalis Botox dosage: 20 Units divided in 4 sites Right: 10 Left: 10   D. Temporalis Botox dosage: 40 Units divided in 8 sites Right: 20 Left: 20   E. Occipitalis Botox dosage 30 Units divided in 6 sites Right: 15 Left: 15   F. Cervical Paraspinal Botox dosage: 20 Units divided in 4 sites: Right 10 Left: 10   G. Trapezius Botox dosage: 30 Units divided in 6 sites: Right: 15 Left: 15     RTC 1/11/2024 @ 0900 AM for Botox #3    Headache education provided: good sleep hygiene and 7 hours of sleep per night, stress management, medication overuse education provided. Using more 3 OTC per week may worsen headaches, high intensity interval training has shown to reduce headache frequency. Low carb, high protein has shown to reduce headache frequency. Patient is instructed in keep headache diary.     I have explained the treatment plan, diagnosis, and prognosis to patient. All questions are answered to the best of my knowledge.     Face to face time 30 minutes, including documentation, chart review, counseling, education, review of test results, relevant medical records, and coordination of care.   12/12/2023

## 2023-12-18 ENCOUNTER — OFFICE VISIT (OUTPATIENT)
Dept: FAMILY MEDICINE | Facility: CLINIC | Age: 28
End: 2023-12-18
Payer: MEDICAID

## 2023-12-18 VITALS
BODY MASS INDEX: 49.39 KG/M2 | WEIGHT: 261.63 LBS | HEART RATE: 94 BPM | SYSTOLIC BLOOD PRESSURE: 128 MMHG | HEIGHT: 61 IN | DIASTOLIC BLOOD PRESSURE: 72 MMHG | TEMPERATURE: 97 F | OXYGEN SATURATION: 98 %

## 2023-12-18 DIAGNOSIS — E05.90 SUBCLINICAL HYPERTHYROIDISM: ICD-10-CM

## 2023-12-18 DIAGNOSIS — R10.10 UPPER ABDOMINAL PAIN, UNSPECIFIED: ICD-10-CM

## 2023-12-18 DIAGNOSIS — G47.00 INSOMNIA, UNSPECIFIED TYPE: ICD-10-CM

## 2023-12-18 DIAGNOSIS — D75.1 ERYTHROCYTOSIS: ICD-10-CM

## 2023-12-18 DIAGNOSIS — F32.A DEPRESSION, UNSPECIFIED DEPRESSION TYPE: Primary | ICD-10-CM

## 2023-12-18 PROBLEM — R79.89 LOW TSH LEVEL: Status: ACTIVE | Noted: 2023-12-18

## 2023-12-18 PROCEDURE — 1160F PR REVIEW ALL MEDS BY PRESCRIBER/CLIN PHARMACIST DOCUMENTED: ICD-10-PCS | Mod: CPTII,,, | Performed by: NURSE PRACTITIONER

## 2023-12-18 PROCEDURE — 3078F DIAST BP <80 MM HG: CPT | Mod: CPTII,,, | Performed by: NURSE PRACTITIONER

## 2023-12-18 PROCEDURE — 3008F BODY MASS INDEX DOCD: CPT | Mod: CPTII,,, | Performed by: NURSE PRACTITIONER

## 2023-12-18 PROCEDURE — 3074F PR MOST RECENT SYSTOLIC BLOOD PRESSURE < 130 MM HG: ICD-10-PCS | Mod: CPTII,,, | Performed by: NURSE PRACTITIONER

## 2023-12-18 PROCEDURE — 99214 PR OFFICE/OUTPT VISIT, EST, LEVL IV, 30-39 MIN: ICD-10-PCS | Mod: ,,, | Performed by: NURSE PRACTITIONER

## 2023-12-18 PROCEDURE — 3008F PR BODY MASS INDEX (BMI) DOCUMENTED: ICD-10-PCS | Mod: CPTII,,, | Performed by: NURSE PRACTITIONER

## 2023-12-18 PROCEDURE — 99214 OFFICE O/P EST MOD 30 MIN: CPT | Mod: ,,, | Performed by: NURSE PRACTITIONER

## 2023-12-18 PROCEDURE — 1159F MED LIST DOCD IN RCRD: CPT | Mod: CPTII,,, | Performed by: NURSE PRACTITIONER

## 2023-12-18 PROCEDURE — 4010F ACE/ARB THERAPY RXD/TAKEN: CPT | Mod: CPTII,,, | Performed by: NURSE PRACTITIONER

## 2023-12-18 PROCEDURE — 4010F PR ACE/ARB THEARPY RXD/TAKEN: ICD-10-PCS | Mod: CPTII,,, | Performed by: NURSE PRACTITIONER

## 2023-12-18 PROCEDURE — 3074F SYST BP LT 130 MM HG: CPT | Mod: CPTII,,, | Performed by: NURSE PRACTITIONER

## 2023-12-18 PROCEDURE — 3078F PR MOST RECENT DIASTOLIC BLOOD PRESSURE < 80 MM HG: ICD-10-PCS | Mod: CPTII,,, | Performed by: NURSE PRACTITIONER

## 2023-12-18 PROCEDURE — 1160F RVW MEDS BY RX/DR IN RCRD: CPT | Mod: CPTII,,, | Performed by: NURSE PRACTITIONER

## 2023-12-18 PROCEDURE — 3044F PR MOST RECENT HEMOGLOBIN A1C LEVEL <7.0%: ICD-10-PCS | Mod: CPTII,,, | Performed by: NURSE PRACTITIONER

## 2023-12-18 PROCEDURE — 1159F PR MEDICATION LIST DOCUMENTED IN MEDICAL RECORD: ICD-10-PCS | Mod: CPTII,,, | Performed by: NURSE PRACTITIONER

## 2023-12-18 PROCEDURE — 3044F HG A1C LEVEL LT 7.0%: CPT | Mod: CPTII,,, | Performed by: NURSE PRACTITIONER

## 2023-12-18 RX ORDER — ZOLPIDEM TARTRATE 10 MG/1
10 TABLET ORAL NIGHTLY PRN
Qty: 30 TABLET | Refills: 2 | Status: SHIPPED | OUTPATIENT
Start: 2023-12-18 | End: 2024-01-16 | Stop reason: SDUPTHER

## 2023-12-18 RX ORDER — CARIPRAZINE 1.5 MG/1
1.5 CAPSULE, GELATIN COATED ORAL DAILY
Qty: 30 CAPSULE | Refills: 11 | Status: SHIPPED | OUTPATIENT
Start: 2023-12-18 | End: 2024-03-05 | Stop reason: SDUPTHER

## 2023-12-18 NOTE — PROGRESS NOTES
"Patient ID: Princess Colindres  : 1995    Chief Complaint: Follow-up (2 week ), Anxiety, and Depression    Allergies: Patient is allergic to codeine.     History of Present Illness:  The patient is a 28 y.o.    Black or   White female who presents to clinic for follow up on Follow-up (2 week ), Anxiety, and Depression     She has been on Celexa for a long period of time. At last visit she complained of  increased depression and was give samples of Vraylar and she reports that her mood has improved.  However she does report that she feels a little bit restless.  She is only on day 10 of therapy and would like to take it for a little longer to see if she adjusts.    She has issue with upper RT abdomen pain and nausea with occasional vomiting following eating. This has been ongoing for a couple months. Occasionally she does have some diarrhea, this occurs more with "rich" foods.  Ultrasound of the gallbladder was negative for any cholelithiasis or gallbladder thickening.    Social History:  reports that she has never smoked. She has never used smokeless tobacco. She reports that she does not currently use alcohol. She reports that she does not use drugs.    Past Medical History:  has a past medical history of Acute angle-closure glaucoma, Asthma, Cervical radiculopathy, Family history of diabetes mellitus, Fibromyalgia, BRITNEY (generalized anxiety disorder), Hypertension, Insomnia, Migraine headache, Ovarian cyst, and PCOS (polycystic ovarian syndrome).    Current Medications:  Current Outpatient Medications   Medication Instructions    citalopram (CELEXA) 40 mg, Oral, Daily    cloNIDine (CATAPRES) 0.3 mg, Oral, Nightly    cyclobenzaprine (FLEXERIL) 10 mg, Oral, 2 times daily PRN    gabapentin (NEURONTIN) 300 mg, Oral, 3 times daily    hydroCHLOROthiazide (HYDRODIURIL) 12.5 mg, Oral, Daily    lisinopriL (PRINIVIL,ZESTRIL) 30 mg, Oral, Daily    naproxen sodium (ANAPROX) 220 mg, Oral, Daily PRN    " "promethazine (PHENERGAN) 25 mg, Oral, Every 6 hours PRN    UBRELVY 100 mg, Oral, Daily PRN    VRAYLAR 1.5 mg, Oral, Daily    zolpidem (AMBIEN) 10 mg, Oral, Nightly PRN       Review of Systems   See HPI    Visit Vitals  /72 (BP Location: Left arm, Patient Position: Sitting, BP Method: Medium (Manual))   Pulse 94   Temp 97.3 °F (36.3 °C) (Temporal)   Ht 5' 0.98" (1.549 m)   Wt 118.7 kg (261 lb 9.6 oz)   SpO2 98%   BMI 49.45 kg/m²       Physical Exam  Vitals reviewed.   Constitutional:       Appearance: Normal appearance.   Cardiovascular:      Heart sounds: Normal heart sounds.   Pulmonary:      Breath sounds: Normal breath sounds.   Skin:     General: Skin is warm and dry.   Neurological:      Mental Status: She is oriented to person, place, and time.            Assessment & Plan:  1. Depression, unspecified depression type  Overview:  On Celexa 40 mg daily.   Vraylar 1.5 mg added (12/04/2023)    Assessment & Plan:  Continue with current regimen.  Patient will let me know if the Vraylar is not tolerated.      Orders:  -     cariprazine (VRAYLAR) 1.5 mg Cap; Take 1 capsule (1.5 mg total) by mouth once daily.  Dispense: 30 capsule; Refill: 11    2. Insomnia, unspecified type  -     zolpidem (AMBIEN) 10 mg Tab; Take 1 tablet (10 mg total) by mouth nightly as needed (Insomnia).  Dispense: 30 tablet; Refill: 2    3. Upper abdominal pain, unspecified  Overview:  Gallbladder US: negative for cholelithiasis or gallbladder thickening.  We will go ahead and order a HIDA scan to assess gallbladder function.    Orders:  -     NM Hepatobiliary Imaging HIDA; Future; Expected date: 12/18/2023    4. Erythrocytosis  Overview:  RBC 5.50, H&H 13.8/40.7, MCV 74.0, MCH 25.1, RDW 14.1, platelets 265 (10/27/2023).    Hematology:  Lab Results   Component Value Date    WBC 5.75 11/08/2023    RBC 5.44 (H) 11/08/2023    HGB 13.4 11/08/2023    HCT 39.1 11/08/2023    MCV 71.9 (L) 11/08/2023    MCH 24.6 (L) 11/08/2023    MCHC 34.3 " 11/08/2023    RDW 14.0 11/08/2023     11/08/2023    MPV 12.5 (H) 11/08/2023     Iron studies WNL except mildly reduced iron sat at 19%.  Decreased retic (1.08), peripheral smear: microcytosis with hypochromasia.  Abnormal hemoglobin electrophoresis.  E-consult with Hematology done; recommendation to continue workup for other causes of fatigue symptom. Sleep study pending.     Orders:  -     CBC Auto Differential; Future; Expected date: 01/18/2024  -     Comprehensive Metabolic Panel; Future; Expected date: 01/18/2024    5. Subclinical hyperthyroidism  -     TSH; Future; Expected date: 01/18/2024  -     T4, Free; Future; Expected date: 01/18/2024         Future Appointments   Date Time Provider Department Center   1/11/2024  9:00 AM BOTOX, [facility] NEUROLOGY [facility]UR[facility] [place]   1/24/2024  9:20 AM LAB, [facility] LABORATORY DRAW STATION [facility] LUC[facility] [place]   1/31/2024 11:30 AM Florencia Em FNP-[facility]       Follow up for 6 wk f/u labwork, non-fasting labs prior. Call sooner if needed.    GAYATRI Burrows    Lab Frequency Next Occurrence   Glucose Tolerance, 2 Hours Once 06/14/2023   Ambulatory referral/consult to Allergy Once 11/06/2023   Ambulatory referral/consult to Sleep Disorders Once 12/05/2023

## 2023-12-27 ENCOUNTER — PATIENT MESSAGE (OUTPATIENT)
Dept: FAMILY MEDICINE | Facility: CLINIC | Age: 28
End: 2023-12-27
Payer: MEDICAID

## 2024-01-03 ENCOUNTER — TELEPHONE (OUTPATIENT)
Dept: OBSTETRICS AND GYNECOLOGY | Facility: CLINIC | Age: 29
End: 2024-01-03

## 2024-01-03 ENCOUNTER — OFFICE VISIT (OUTPATIENT)
Dept: OBSTETRICS AND GYNECOLOGY | Facility: CLINIC | Age: 29
End: 2024-01-03
Payer: MEDICAID

## 2024-01-03 ENCOUNTER — HOSPITAL ENCOUNTER (OUTPATIENT)
Dept: RADIOLOGY | Facility: HOSPITAL | Age: 29
Discharge: HOME | End: 2024-01-03
Attending: NURSE PRACTITIONER
Payer: MEDICAID

## 2024-01-03 VITALS — BODY MASS INDEX: 49.16 KG/M2 | WEIGHT: 260 LBS

## 2024-01-03 VITALS
WEIGHT: 260.38 LBS | BODY MASS INDEX: 49.16 KG/M2 | SYSTOLIC BLOOD PRESSURE: 124 MMHG | TEMPERATURE: 98 F | HEIGHT: 61 IN | DIASTOLIC BLOOD PRESSURE: 72 MMHG

## 2024-01-03 DIAGNOSIS — R10.10 UPPER ABDOMINAL PAIN, UNSPECIFIED: ICD-10-CM

## 2024-01-03 DIAGNOSIS — Z97.5 INTRAUTERINE DEVICE: Primary | ICD-10-CM

## 2024-01-03 DIAGNOSIS — R79.89 ELEVATED PROLACTIN LEVEL: ICD-10-CM

## 2024-01-03 PROCEDURE — 63600175 PHARM REV CODE 636 W HCPCS: Performed by: NURSE PRACTITIONER

## 2024-01-03 PROCEDURE — A9537 TC99M MEBROFENIN: HCPCS

## 2024-01-03 PROCEDURE — 3078F DIAST BP <80 MM HG: CPT | Mod: CPTII,,, | Performed by: OBSTETRICS & GYNECOLOGY

## 2024-01-03 PROCEDURE — 1159F MED LIST DOCD IN RCRD: CPT | Mod: CPTII,,, | Performed by: OBSTETRICS & GYNECOLOGY

## 2024-01-03 PROCEDURE — 3008F BODY MASS INDEX DOCD: CPT | Mod: CPTII,,, | Performed by: OBSTETRICS & GYNECOLOGY

## 2024-01-03 PROCEDURE — 99213 OFFICE O/P EST LOW 20 MIN: CPT | Mod: ,,, | Performed by: OBSTETRICS & GYNECOLOGY

## 2024-01-03 PROCEDURE — 3074F SYST BP LT 130 MM HG: CPT | Mod: CPTII,,, | Performed by: OBSTETRICS & GYNECOLOGY

## 2024-01-03 RX ORDER — ALBUTEROL SULFATE 90 UG/1
2 AEROSOL, METERED RESPIRATORY (INHALATION)
COMMUNITY
Start: 2023-12-27

## 2024-01-03 RX ORDER — CETIRIZINE HCL 1 MG/ML
10 SOLUTION, ORAL ORAL
COMMUNITY
Start: 2023-12-27

## 2024-01-03 RX ORDER — FLUTICASONE PROPIONATE 50 UG/1
1 SPRAY, METERED NASAL 2 TIMES DAILY
COMMUNITY
Start: 2023-12-27

## 2024-01-03 RX ORDER — SINCALIDE 5 UG/5ML
0.02 INJECTION, POWDER, LYOPHILIZED, FOR SOLUTION INTRAVENOUS ONCE
Status: COMPLETED | OUTPATIENT
Start: 2024-01-03 | End: 2024-01-03

## 2024-01-03 RX ADMIN — SINCALIDE 2 MCG: 5 INJECTION, POWDER, LYOPHILIZED, FOR SOLUTION INTRAVENOUS at 01:01

## 2024-01-03 NOTE — PROGRESS NOTES
Chief Complaint:  Discuss IUD Removal     History of Present Illness:  Patient is a 28 y.o.  presents c/o a lot of medical complications. States recent GI issues including GERD, RUQ pain. Undergoing HIDA scan this afternoon and future upper GI scope planned. Desires to discuss IUD removal.     No complaints with IUD placed 8/3/23. No cycle with IUD. No pelvic pain, otherwise content. Previous pelvic pain resolved with IUD.      LMP: no cycle w/ Mirena   Frequency: n/a  Cycle length: n/a  Flow: n/a  Intermenstrual bleeding: No  Postcoital bleeding: No  Dysmenorrhea: n/a  Sexually active: not currently   Dyspareunia: no  Contraception: Mirena, 8/3/23  H/o STI: no  Last pap: 23 NIL  H/o abnl pap: no   Colposcopy: no  Gardasil: 0/3  MMG: n/a  H/o abnl MMG: n/a  Colonoscopy: n/a      Review of systems:  General/Constitutional: Chills denies. Fatigue/weakness denies. Fever denies. Night sweats denies. Hot flashes denies  Breast: Dimpling denies. Breast mass denies. Breast pain/tenderness denies. Nipple discharge denies. Puckering denies.  Gastrointestinal: Abdominal pain denies. Blood in stool denies. Constipation denies. Diarrhea denies. Heartburn denies. Nausea denies. Vomiting denies   Genitourinary: Incontinence denies. Blood in urine denies. Frequent urination denies. Urgency denies. Painful urination denies. Nocturia denies   Gynecologic: Irregular menses denies. Heavy bleeding denies. Painful menses denies. Vaginal discharge denies. Vaginal odor denies. Vaginal itching/Irritation denies. Vaginal lesion denies.  Pelvic pain denies. Decreased libido denies. Vulvar lesion denies. Prolapse of genital organs denies. Painful intercourse denies. Postcoital bleeding denies   Psychiatric: Mood lability denies. Depressed mood denies. Suicidal thoughts denies. Anxiety denies. Overwhelmed denies. Appetite normal. Energy level normal.     OB History    Para Term  AB Living   1 1 1 0 0 1   SAB IAB  Ectopic Multiple Live Births   0 0 0 0 1      # 1 - Date: 18, Sex: Male, Weight: 3.147 kg (6 lb 15 oz), GA: 38w0d, Delivery: , Low Transverse, Apgar1: None, Apgar5: None, Living: Living, Birth Comments: None      Past Medical History:   Diagnosis Date    Acute angle-closure glaucoma     bilateral    Asthma     Cervical radiculopathy     Family history of diabetes mellitus     Fibromyalgia     BRITNEY (generalized anxiety disorder)     Hypertension     Insomnia     Migraine headache     Ovarian cyst     PCOS (polycystic ovarian syndrome)      Past Surgical History:   Procedure Laterality Date     SECTION  2018        Current Outpatient Medications:     albuterol (PROVENTIL/VENTOLIN HFA) 90 mcg/actuation inhaler, Inhale 2 puffs into the lungs., Disp: , Rfl:     cariprazine (VRAYLAR) 1.5 mg Cap, Take 1 capsule (1.5 mg total) by mouth once daily., Disp: 30 capsule, Rfl: 11    citalopram (CELEXA) 40 MG tablet, Take 1 tablet (40 mg total) by mouth once daily., Disp: 30 tablet, Rfl: 11    cloNIDine (CATAPRES) 0.3 MG tablet, Take 1 tablet (0.3 mg total) by mouth every evening., Disp: 30 tablet, Rfl: 0    cyclobenzaprine (FLEXERIL) 10 MG tablet, Take 1 tablet (10 mg total) by mouth 2 (two) times daily as needed for Muscle spasms., Disp: 60 tablet, Rfl: 0    FLONASE ALLERGY RELIEF 50 mcg/actuation nasal spray, 1 spray by Each Nostril route 2 (two) times daily., Disp: , Rfl:     gabapentin (NEURONTIN) 300 MG capsule, Take 1 capsule (300 mg total) by mouth 3 (three) times daily., Disp: 90 capsule, Rfl: 0    hydroCHLOROthiazide (HYDRODIURIL) 12.5 MG Tab, Take 12.5 mg by mouth once daily., Disp: , Rfl:     lisinopriL (PRINIVIL,ZESTRIL) 30 MG tablet, Take 1 tablet (30 mg total) by mouth once daily., Disp: 30 tablet, Rfl: 11    naproxen sodium (ANAPROX) 220 MG tablet, Take 220 mg by mouth daily as needed., Disp: , Rfl:     promethazine (PHENERGAN) 25 MG tablet, Take 1 tablet (25 mg total) by mouth every 6  "(six) hours as needed for Nausea., Disp: 15 tablet, Rfl: 0    UBRELVY 100 mg tablet, Take 1 tablet (100 mg total) by mouth daily as needed for Migraine., Disp: 20 tablet, Rfl: 0    zolpidem (AMBIEN) 10 mg Tab, Take 1 tablet (10 mg total) by mouth nightly as needed (Insomnia)., Disp: 30 tablet, Rfl: 2    ZYRTEC 10 mg tablet, Take 10 mg by mouth., Disp: , Rfl:     Current Facility-Administered Medications:     [START ON 1/11/2024] onabotulinumtoxina injection 200 Units, 200 Units, Intramuscular, 1 time in Clinic/HOD, Mary Fried MD      Physical Exam:  /72 (BP Location: Right arm)   Temp 97.5 °F (36.4 °C)   Ht 5' 0.98" (1.549 m)   Wt 118.1 kg (260 lb 6.4 oz)   BMI 49.23 kg/m²     Constitutional: General appearance: healthy, well-nourished and well-developed   Psychiatric:  Orientation to time, place and person. Normal mood and affect and active, alert   Abdomen: Auscultation/Inspection/Palpation: No tenderness or masses. Soft, nondistended       Assessment/Plan:  1. Intrauterine device  Educated  Pelvic pain resolved with IUD  Offered removal, declines  Desires to wait following HIDA scan and gallbladder planning  If patient desires removal, may call and schedule appt for procedure     2. Elevated prolactin level  7/17/23: 70.70  6/28/23: 47.91     MRI from 12/2022 to eval CSF flow secondary to known Chiari malformation  . Due to no mass and mild elevation of prolactin, will not repeat MRI at this time.       "

## 2024-01-04 ENCOUNTER — TELEPHONE (OUTPATIENT)
Dept: FAMILY MEDICINE | Facility: CLINIC | Age: 29
End: 2024-01-04
Payer: MEDICAID

## 2024-01-04 ENCOUNTER — PATIENT MESSAGE (OUTPATIENT)
Dept: FAMILY MEDICINE | Facility: CLINIC | Age: 29
End: 2024-01-04
Payer: MEDICAID

## 2024-01-04 DIAGNOSIS — R11.2 NAUSEA AND VOMITING, UNSPECIFIED VOMITING TYPE: ICD-10-CM

## 2024-01-04 DIAGNOSIS — K80.50 BILIARY COLIC: ICD-10-CM

## 2024-01-04 DIAGNOSIS — R93.2 ABNORMAL FINDINGS ON DIAGNOSTIC IMAGING OF GALLBLADDER: Primary | ICD-10-CM

## 2024-01-04 NOTE — TELEPHONE ENCOUNTER
I called her and notified her. She said that she doesn't have any preference so I told her that I can see if Dr. Moreno can see her tomorrow. She has phenergan. She stated she vomited 4x this morning so I explained to her that she needs to take the phenergan and I would call her back in regards to the appointment. She verbalized understanding.

## 2024-01-04 NOTE — TELEPHONE ENCOUNTER
----- Message from Florencia Em, FNP-C sent at 1/4/2024  8:08 AM CST -----  HiDA scan shows that her Gallbladder is not functioning well. She will need to have it removed. Ask who she would like to go to for this and then see if we can get her in quickly.   We can send her some nausea medication; ask her to try to stay hydrated.

## 2024-01-04 NOTE — TELEPHONE ENCOUNTER
I called Dr. Moreno's office. They scheduled her for tomorrow at 6am. I called Princess and notified her. I instructed her to bring her insurance card, ID, and medication bottles. She verbalized understanding.

## 2024-01-05 ENCOUNTER — HOSPITAL ENCOUNTER (OUTPATIENT)
Dept: PREADMISSION TESTING | Facility: HOSPITAL | Age: 29
Discharge: HOME | End: 2024-01-05
Attending: SURGERY
Payer: MEDICAID

## 2024-01-05 ENCOUNTER — ANESTHESIA (OUTPATIENT)
Dept: SURGERY | Facility: HOSPITAL | Age: 29
End: 2024-01-05
Payer: MEDICAID

## 2024-01-05 ENCOUNTER — HOSPITAL ENCOUNTER (OUTPATIENT)
Facility: HOSPITAL | Age: 29
Discharge: HOME | End: 2024-01-05
Attending: SURGERY | Admitting: SURGERY
Payer: MEDICAID

## 2024-01-05 ENCOUNTER — ANESTHESIA EVENT (OUTPATIENT)
Dept: SURGERY | Facility: HOSPITAL | Age: 29
End: 2024-01-05
Payer: MEDICAID

## 2024-01-05 VITALS
WEIGHT: 260 LBS | TEMPERATURE: 98 F | SYSTOLIC BLOOD PRESSURE: 144 MMHG | OXYGEN SATURATION: 100 % | HEART RATE: 85 BPM | RESPIRATION RATE: 20 BRPM | DIASTOLIC BLOOD PRESSURE: 98 MMHG | BODY MASS INDEX: 47.55 KG/M2

## 2024-01-05 VITALS — HEIGHT: 62 IN | WEIGHT: 260 LBS | BODY MASS INDEX: 47.84 KG/M2

## 2024-01-05 DIAGNOSIS — K81.9 CHOLECYSTITIS: Primary | ICD-10-CM

## 2024-01-05 DIAGNOSIS — K81.1 CHRONIC CHOLECYSTITIS: Primary | ICD-10-CM

## 2024-01-05 DIAGNOSIS — K81.9 CHOLECYSTITIS: ICD-10-CM

## 2024-01-05 LAB — B-HCG SERPL QL: NEGATIVE

## 2024-01-05 PROCEDURE — 36000708 HC OR TIME LEV III 1ST 15 MIN: Performed by: SURGERY

## 2024-01-05 PROCEDURE — 71000033 HC RECOVERY, INTIAL HOUR: Performed by: SURGERY

## 2024-01-05 PROCEDURE — 63600175 PHARM REV CODE 636 W HCPCS: Performed by: SURGERY

## 2024-01-05 PROCEDURE — 37000008 HC ANESTHESIA 1ST 15 MINUTES: Performed by: SURGERY

## 2024-01-05 PROCEDURE — 37000009 HC ANESTHESIA EA ADD 15 MINS: Performed by: SURGERY

## 2024-01-05 PROCEDURE — 27201423 OPTIME MED/SURG SUP & DEVICES STERILE SUPPLY: Performed by: SURGERY

## 2024-01-05 PROCEDURE — 63600175 PHARM REV CODE 636 W HCPCS: Mod: JZ,JG | Performed by: SURGERY

## 2024-01-05 PROCEDURE — D9220A PRA ANESTHESIA: Mod: ,,, | Performed by: NURSE ANESTHETIST, CERTIFIED REGISTERED

## 2024-01-05 PROCEDURE — 71000015 HC POSTOP RECOV 1ST HR: Performed by: SURGERY

## 2024-01-05 PROCEDURE — 81025 URINE PREGNANCY TEST: CPT | Performed by: SURGERY

## 2024-01-05 PROCEDURE — 71000016 HC POSTOP RECOV ADDL HR: Performed by: SURGERY

## 2024-01-05 PROCEDURE — 36000709 HC OR TIME LEV III EA ADD 15 MIN: Performed by: SURGERY

## 2024-01-05 PROCEDURE — 63600175 PHARM REV CODE 636 W HCPCS: Performed by: NURSE ANESTHETIST, CERTIFIED REGISTERED

## 2024-01-05 PROCEDURE — 25000003 PHARM REV CODE 250: Performed by: NURSE ANESTHETIST, CERTIFIED REGISTERED

## 2024-01-05 PROCEDURE — 25000003 PHARM REV CODE 250: Performed by: SURGERY

## 2024-01-05 RX ORDER — MIDAZOLAM HYDROCHLORIDE 1 MG/ML
2 INJECTION INTRAMUSCULAR; INTRAVENOUS
Status: COMPLETED | OUTPATIENT
Start: 2024-01-05 | End: 2024-01-05

## 2024-01-05 RX ORDER — SODIUM CHLORIDE, SODIUM LACTATE, POTASSIUM CHLORIDE, CALCIUM CHLORIDE 600; 310; 30; 20 MG/100ML; MG/100ML; MG/100ML; MG/100ML
INJECTION, SOLUTION INTRAVENOUS CONTINUOUS
Status: DISCONTINUED | OUTPATIENT
Start: 2024-01-05 | End: 2024-01-05 | Stop reason: HOSPADM

## 2024-01-05 RX ORDER — ACETAMINOPHEN 10 MG/ML
1000 INJECTION, SOLUTION INTRAVENOUS ONCE
Status: COMPLETED | OUTPATIENT
Start: 2024-01-05 | End: 2024-01-05

## 2024-01-05 RX ORDER — KETOROLAC TROMETHAMINE 30 MG/ML
INJECTION, SOLUTION INTRAMUSCULAR; INTRAVENOUS
Status: DISCONTINUED | OUTPATIENT
Start: 2024-01-05 | End: 2024-01-05

## 2024-01-05 RX ORDER — LABETALOL HCL 20 MG/4 ML
10 SYRINGE (ML) INTRAVENOUS ONCE
Status: COMPLETED | OUTPATIENT
Start: 2024-01-05 | End: 2024-01-05

## 2024-01-05 RX ORDER — SODIUM CHLORIDE 9 MG/ML
INJECTION, SOLUTION INTRAVENOUS CONTINUOUS
Status: DISCONTINUED | OUTPATIENT
Start: 2024-01-05 | End: 2024-01-05 | Stop reason: HOSPADM

## 2024-01-05 RX ORDER — SODIUM CHLORIDE, SODIUM LACTATE, POTASSIUM CHLORIDE, CALCIUM CHLORIDE 600; 310; 30; 20 MG/100ML; MG/100ML; MG/100ML; MG/100ML
INJECTION, SOLUTION INTRAVENOUS CONTINUOUS
Status: CANCELLED | OUTPATIENT
Start: 2024-01-05

## 2024-01-05 RX ORDER — LIDOCAINE HYDROCHLORIDE 10 MG/ML
INJECTION INFILTRATION; PERINEURAL
Status: DISCONTINUED | OUTPATIENT
Start: 2024-01-05 | End: 2024-01-05 | Stop reason: HOSPADM

## 2024-01-05 RX ORDER — VECURONIUM BROMIDE FOR INJECTION 1 MG/ML
INJECTION, POWDER, LYOPHILIZED, FOR SOLUTION INTRAVENOUS
Status: DISCONTINUED | OUTPATIENT
Start: 2024-01-05 | End: 2024-01-05

## 2024-01-05 RX ORDER — FENTANYL CITRATE 50 UG/ML
INJECTION, SOLUTION INTRAMUSCULAR; INTRAVENOUS
Status: DISCONTINUED | OUTPATIENT
Start: 2024-01-05 | End: 2024-01-05

## 2024-01-05 RX ORDER — ONDANSETRON 2 MG/ML
INJECTION INTRAMUSCULAR; INTRAVENOUS
Status: DISCONTINUED | OUTPATIENT
Start: 2024-01-05 | End: 2024-01-05

## 2024-01-05 RX ORDER — HYDRALAZINE HYDROCHLORIDE 20 MG/ML
5 INJECTION INTRAMUSCULAR; INTRAVENOUS ONCE
Status: COMPLETED | OUTPATIENT
Start: 2024-01-05 | End: 2024-01-05

## 2024-01-05 RX ORDER — PROPOFOL 10 MG/ML
VIAL (ML) INTRAVENOUS
Status: DISCONTINUED | OUTPATIENT
Start: 2024-01-05 | End: 2024-01-05

## 2024-01-05 RX ORDER — FAMOTIDINE 20 MG/1
20 TABLET, FILM COATED ORAL
Status: COMPLETED | OUTPATIENT
Start: 2024-01-05 | End: 2024-01-05

## 2024-01-05 RX ORDER — TRIPROLIDINE/PSEUDOEPHEDRINE 2.5MG-60MG
600 TABLET ORAL ONCE
Status: COMPLETED | OUTPATIENT
Start: 2024-01-05 | End: 2024-01-05

## 2024-01-05 RX ORDER — BUPIVACAINE HYDROCHLORIDE 5 MG/ML
INJECTION, SOLUTION EPIDURAL; INTRACAUDAL
Status: DISCONTINUED | OUTPATIENT
Start: 2024-01-05 | End: 2024-01-05 | Stop reason: HOSPADM

## 2024-01-05 RX ADMIN — SODIUM CHLORIDE, POTASSIUM CHLORIDE, SODIUM LACTATE AND CALCIUM CHLORIDE: 600; 310; 30; 20 INJECTION, SOLUTION INTRAVENOUS at 01:01

## 2024-01-05 RX ADMIN — LABETALOL HYDROCHLORIDE 10 MG: 5 INJECTION, SOLUTION INTRAVENOUS at 03:01

## 2024-01-05 RX ADMIN — VECURONIUM BROMIDE 6 MG: 10 INJECTION, POWDER, FOR SOLUTION INTRAVENOUS at 02:01

## 2024-01-05 RX ADMIN — FENTANYL CITRATE 100 MCG: 50 INJECTION, SOLUTION INTRAMUSCULAR; INTRAVENOUS at 01:01

## 2024-01-05 RX ADMIN — FAMOTIDINE 20 MG: 20 TABLET, FILM COATED ORAL at 01:01

## 2024-01-05 RX ADMIN — SUGAMMADEX 200 MG: 100 INJECTION, SOLUTION INTRAVENOUS at 02:01

## 2024-01-05 RX ADMIN — FENTANYL CITRATE 50 MCG: 50 INJECTION, SOLUTION INTRAMUSCULAR; INTRAVENOUS at 02:01

## 2024-01-05 RX ADMIN — IBUPROFEN 600 MG: 200 SUSPENSION ORAL at 05:01

## 2024-01-05 RX ADMIN — ACETAMINOPHEN 1000 MG: 10 INJECTION INTRAVENOUS at 03:01

## 2024-01-05 RX ADMIN — CEFAZOLIN 2 G: 2 INJECTION, POWDER, FOR SOLUTION INTRAMUSCULAR; INTRAVENOUS at 02:01

## 2024-01-05 RX ADMIN — KETOROLAC TROMETHAMINE 30 MG: 30 INJECTION, SOLUTION INTRAMUSCULAR; INTRAVENOUS at 02:01

## 2024-01-05 RX ADMIN — ONDANSETRON 4 MG: 2 INJECTION INTRAMUSCULAR; INTRAVENOUS at 02:01

## 2024-01-05 RX ADMIN — PROPOFOL 160 MG: 10 INJECTION, EMULSION INTRAVENOUS at 02:01

## 2024-01-05 RX ADMIN — MIDAZOLAM HYDROCHLORIDE 2 MG: 1 INJECTION, SOLUTION INTRAMUSCULAR; INTRAVENOUS at 01:01

## 2024-01-05 RX ADMIN — HYDRALAZINE HYDROCHLORIDE 5 MG: 20 INJECTION INTRAMUSCULAR; INTRAVENOUS at 03:01

## 2024-01-05 NOTE — OP NOTE
[facility]  Operative Note      Date of Procedure: 1/5/2024     Procedure: Procedure(s) (LRB):  CHOLECYSTECTOMY, LAPAROSCOPIC, WITH CHOLANGIOGRAM (N/A)   Normal intraoperative cholangiography  Surgeon(s) and Role:     * Richard Spencer MD - Primary    Assisting Surgeon: None    Pre-Operative Diagnosis: Chronic cholecystitis [K81.1]    Post-Operative Diagnosis: Post-Op Diagnosis Codes:     * Chronic cholecystitis [K81.1]  Normal intraoperative cholangiography  Anesthesia: General    Operative Findings (including complications, if any):  Patient is a 28-year-old  female with a history of asthma anxiety anemia hypertension fibromyalgia gastroesophageal reflux disease and insomnia with migraines.  Patient had midepigastric right upper quadrant abdominal pain nausea bloating vomiting inability maintain diet with associated diarrhea.  Patient has fatty food intolerance.  Patient had a HIDA scan showing a 12% ejection fraction with ultrasound of the abdomen showed fatty liver.  Patient was very ill and as result consented for cholecystectomy.  Patient was brought to the OR supine position general anesthetic prepped and draped in a sterile fashion     A supraumbilical incision was made with insertion of Veress needle insufflation abdomen of 14 mm of mercury with insertion of a 5 mm trocar.  Patient then underwent insertion of 2 lateral 5 mm trocars and 1 5 mm trocar just to the right of falciform ligament.  Gallbladder is grasped and mobilized cystic duct and artery were isolated triangle Calot was clearly dissected intraoperative cholangiogram was obtained.  Patient underwent clipping of the cystic duct and artery cauterization of gallbladder off the liver bed removed through the epigastric trocar site.  The aponeuroses of the 5 mm trocar sites were closed with 0 Vicryl on  needle.  SubQ was closed with 4 plain gut suture.  Dermabond was used for the skin.  Sterile dressings were  applied no problems encountered patient tolerated procedure well.        Description of Technical Procedures:  Noted above       Significant Surgical Tasks Conducted by the Assistant(s), if Applicable:  None       Estimated Blood Loss (EBL): * No values recorded between 1/5/2024  2:38 PM and 1/5/2024  3:01 PM *           Implants: * No implants in log *    Specimens:   Specimen (24h ago, onward)       Start     Ordered    01/05/24 1450  Specimen to Pathology General Surgery  Once        References:    Click here for ordering Quick Tip   Question Answer Comment   Procedure Type: General Surgery    Specimen Source Gallbladder    Clinical Information: LAP SHEREEN WITH IOC    Release to patient Immediate        01/05/24 1450    01/05/24 1448  Specimen to Pathology  RELEASE UPON ORDERING        References:    Click here for ordering Quick Tip   Question:  Release to patient  Answer:  Immediate    01/05/24 1441                            Condition: Good    Disposition: PACU - hemodynamically stable.    Attestation: I was present and scrubbed for the entire procedure.    Discharge Note    OUTCOME: Patient tolerated treatment/procedure well without complication and is now ready for discharge.    DISPOSITION: Home or Self Care    FINAL DIAGNOSIS:  Chronic cholecystitis laparoscopic cholecystectomy with normal intraoperative cholangiography    FOLLOWUP: In clinic 1 week    DISCHARGE INSTRUCTIONS:    Discharge Procedure Orders   Diet general

## 2024-01-05 NOTE — DISCHARGE INSTRUCTIONS
Exofin  PATIENT AFTER-CARE INSTRUCTIONS          Your wound has been repaired using Exofin® High Viscosity topical tissue adhesive. The list below is a guide for you to understand and care for your wound following your procedure.          1. Keep the wound dry.     You may occasionally and briefly wet your wound in the shower or bath at the direction of your physician, but do not soak or scrub the wound area. After showering or bathing, gently blot your wound dry with a soft towel.       2. Avoid Topical Medications     Do not apply liquid or ointment medications, lotions, creams, petroleum jelly, mineral oils or any other product to your wound while the Exofin® adhesive film is in place.         3. Do not rub, scratch, or pick at the wound.     Doing so may compromise the integrity of the wound closure and cause scaring.        4. Protect the wound from prolonged sunlight exposure.     Do not use tanning lamps while the film is in place.        5. Check wound appearance.      Some swelling, redness, and pain are common with all wounds and normally will go away as the wound heals. If swelling, redness, or pain increases, or if the wound feels warm to the touch, contact your doctor. Also contact your doctor if the wound edges reopen or separate.        6. Exofin® will naturally slough off between 5 and 10 days after the procedure.     By this time, your wound should be sufficiently healed. This information is not intended as a substitute for the advice of a physician. For more detailed information, talk to your doctor. Your doctor can choose the best treatment for you in your particular circumstances.               For additional questions and concerns, please consult your doctor.

## 2024-01-05 NOTE — ANESTHESIA PROCEDURE NOTES
Intubation    Date/Time: 1/5/2024 2:00 PM    Performed by: John Savage CRNA  Authorized by: John Savage CRNA    Intubation:     Induction:  Intravenous    Intubated:  Postinduction    Mask Ventilation:  Easy mask    Attempts:  1    Attempted By:  CRNA    Method of Intubation:  Direct    Blade:  Schmidt 2    Laryngeal View Grade: Grade I - full view of cords      Difficult Airway Encountered?: No      Airway Device:  Oral endotracheal tube    Airway Device Size:  7.0    Style/Cuff Inflation:  Cuffed    Tube secured:  20    Secured at:  The lips    Placement Verified By:  Capnometry    Complicating Factors:  None    Findings Post-Intubation:  BS equal bilateral and atraumatic/condition of teeth unchanged

## 2024-01-05 NOTE — PLAN OF CARE
Pt is ready for discharge. Pt urinated w/o difficulty. Tolerating liquids without c/o nausea. Pain is tolerable. Pt wheeled to vehicle with mother at side.

## 2024-01-05 NOTE — DISCHARGE INSTRUCTIONS

## 2024-01-05 NOTE — ANESTHESIA PREPROCEDURE EVALUATION
2024  Princess Colindres is a 28 y.o., female.  Chief Complaint:  Discuss IUD Removal      History of Present Illness:  Patient is a 28 y.o.  presents c/o a lot of medical complications. States recent GI issues including GERD, RUQ pain. Undergoing HIDA scan this afternoon and future upper GI scope planned. Desires to discuss IUD removal.      No complaints with IUD placed 8/3/23. No cycle with IUD. No pelvic pain, otherwise content. Previous pelvic pain resolved with IUD.        LMP: no cycle w/ Mirena   Frequency: n/a  Cycle length: n/a  Flow: n/a  Intermenstrual bleeding: No  Postcoital bleeding: No  Dysmenorrhea: n/a  Sexually active: not currently   Dyspareunia: no  Contraception: Mirena, 8/3/23  H/o STI: no  Last pap: 23 NIL  H/o abnl pap: no   Colposcopy: no  Gardasil: 0/3  MMG: n/a  H/o abnl MMG: n/a  Colonoscopy: n/a        Review of systems:  General/Constitutional: Chills denies. Fatigue/weakness denies. Fever denies. Night sweats denies. Hot flashes denies  Breast: Dimpling denies. Breast mass denies. Breast pain/tenderness denies. Nipple discharge denies. Puckering denies.  Gastrointestinal: Abdominal pain denies. Blood in stool denies. Constipation denies. Diarrhea denies. Heartburn denies. Nausea denies. Vomiting denies   Genitourinary: Incontinence denies. Blood in urine denies. Frequent urination denies. Urgency denies. Painful urination denies. Nocturia denies   Gynecologic: Irregular menses denies. Heavy bleeding denies. Painful menses denies. Vaginal discharge denies. Vaginal odor denies. Vaginal itching/Irritation denies. Vaginal lesion denies.  Pelvic pain denies. Decreased libido denies. Vulvar lesion denies. Prolapse of genital organs denies. Painful intercourse denies. Postcoital bleeding denies   Psychiatric: Mood lability denies. Depressed mood denies. Suicidal  thoughts denies. Anxiety denies. Overwhelmed denies. Appetite normal. Energy level normal.              OB History    Para Term  AB Living   1 1 1 0 0 1   SAB IAB Ectopic Multiple Live Births    0 0 0 0 1       # 1 - Date: 18, Sex: Male, Weight: 3.147 kg (6 lb 15 oz), GA: 38w0d, Delivery: , Low Transverse, Apgar1: None, Apgar5: None, Living: Living, Birth Comments: None             Past Medical History:   Diagnosis Date    Acute angle-closure glaucoma       bilateral    Asthma      Cervical radiculopathy      Family history of diabetes mellitus      Fibromyalgia      BRITNEY (generalized anxiety disorder)      Hypertension      Insomnia      Migraine headache      Ovarian cyst      PCOS (polycystic ovarian syndrome)              Past Surgical History:   Procedure Laterality Date     SECTION   2018         Current Outpatient Medications:     albuterol (PROVENTIL/VENTOLIN HFA) 90 mcg/actuation inhaler, Inhale 2 puffs into the lungs., Disp: , Rfl:     cariprazine (VRAYLAR) 1.5 mg Cap, Take 1 capsule (1.5 mg total) by mouth once daily., Disp: 30 capsule, Rfl: 11    citalopram (CELEXA) 40 MG tablet, Take 1 tablet (40 mg total) by mouth once daily., Disp: 30 tablet, Rfl: 11    cloNIDine (CATAPRES) 0.3 MG tablet, Take 1 tablet (0.3 mg total) by mouth every evening., Disp: 30 tablet, Rfl: 0    cyclobenzaprine (FLEXERIL) 10 MG tablet, Take 1 tablet (10 mg total) by mouth 2 (two) times daily as needed for Muscle spasms., Disp: 60 tablet, Rfl: 0    FLONASE ALLERGY RELIEF 50 mcg/actuation nasal spray, 1 spray by Each Nostril route 2 (two) times daily., Disp: , Rfl:     gabapentin (NEURONTIN) 300 MG capsule, Take 1 capsule (300 mg total) by mouth 3 (three) times daily., Disp: 90 capsule, Rfl: 0    hydroCHLOROthiazide (HYDRODIURIL) 12.5 MG Tab, Take 12.5 mg by mouth once daily., Disp: , Rfl:     lisinopriL (PRINIVIL,ZESTRIL) 30 MG tablet, Take 1 tablet (30 mg total) by mouth once daily.,  "Disp: 30 tablet, Rfl: 11    naproxen sodium (ANAPROX) 220 MG tablet, Take 220 mg by mouth daily as needed., Disp: , Rfl:     promethazine (PHENERGAN) 25 MG tablet, Take 1 tablet (25 mg total) by mouth every 6 (six) hours as needed for Nausea., Disp: 15 tablet, Rfl: 0    UBRELVY 100 mg tablet, Take 1 tablet (100 mg total) by mouth daily as needed for Migraine., Disp: 20 tablet, Rfl: 0    zolpidem (AMBIEN) 10 mg Tab, Take 1 tablet (10 mg total) by mouth nightly as needed (Insomnia)., Disp: 30 tablet, Rfl: 2    ZYRTEC 10 mg tablet, Take 10 mg by mouth., Disp: , Rfl:      Current Facility-Administered Medications:     [START ON 1/11/2024] onabotulinumtoxina injection 200 Units, 200 Units, Intramuscular, 1 time in Clinic/HOD, Mary Fried MD        Physical Exam:  /72 (BP Location: Right arm)   Temp 97.5 °F (36.4 °C)   Ht 5' 0.98" (1.549 m)   Wt 118.1 kg (260 lb 6.4 oz)   BMI 49.23 kg/m²      Constitutional: General appearance: healthy, well-nourished and well-developed   Psychiatric:  Orientation to time, place and person. Normal mood and affect and active, alert   Abdomen: Auscultation/Inspection/Palpation: No tenderness or masses. Soft, nondistended         Assessment/Plan:  1. Intrauterine device  Educated  Pelvic pain resolved with IUD  Offered removal, declines  Desires to wait following HIDA scan and gallbladder planning  If patient desires removal, may call and schedule appt for procedure      2. Elevated prolactin level  7/17/23: 70.70  6/28/23: 47.91     MRI from 12/2022 to eval CSF flow secondary to known Chiari malformation  . Due to no mass and mild elevation of prolactin, will not repeat MRI at this time.           Pre-op Assessment    I have reviewed the Patient Summary Reports.     I have reviewed the Nursing Notes. I have reviewed the NPO Status.   I have reviewed the Medications.     Review of Systems  Anesthesia Hx:  No problems with previous Anesthesia             Denies Family Hx of " Anesthesia complications.    Denies Personal Hx of Anesthesia complications.                    Social:  Non-Smoker       Hematology/Oncology:  Hematology Normal   Oncology Normal                                   EENT/Dental:  EENT/Dental Normal           Cardiovascular:  Exercise tolerance: poor   Hypertension           hyperlipidemia                             Pulmonary:    Asthma mild                   Renal/:  Renal/ Normal                 Hepatic/GI:     GERD             Musculoskeletal:         Spine Disorders: cervical            Neurological:    Neuromuscular Disease,  Headaches                                 Endocrine:  Endocrine Normal          Morbid Obesity / BMI > 40  Dermatological:  Skin Normal    Psych:  Psychiatric History anxiety                 Physical Exam  General: Well nourished, Cooperative, Alert and Oriented    Airway:  Mallampati: III / II/ III  Mouth Opening: Normal  TM Distance: Normal  Tongue: Normal  Neck ROM: Normal ROM    Dental:  Intact        Anesthesia Plan  Type of Anesthesia, risks & benefits discussed:    Anesthesia Type: Gen ETT  Intra-op Monitoring Plan: Standard ASA Monitors  Post Op Pain Control Plan: multimodal analgesia  Induction:  IV  Airway Plan: Direct  Informed Consent: Informed consent signed with the Patient and all parties understand the risks and agree with anesthesia plan.  All questions answered. Patient consented to blood products? Yes  ASA Score: 3  Day of Surgery Review of History & Physical: H&P Update referred to the surgeon/provider.I have interviewed and examined the patient. I have reviewed the patient's H&P dated: There are no significant changes.     Ready For Surgery From Anesthesia Perspective.     .

## 2024-01-05 NOTE — LETTER
January 5, 2024         [address]  [place] [address]  Phone: [phone]       Patient: Princess Colindres   YOB: 1995  Date of Visit: 01/05/2024    To Whom It May Concern:    Jase Colindres  was at [facility] on 01/05/2024. The patient may return to work/school on 01/19/2023 with no restrictions. If you have any questions or concerns, or if I can be of further assistance, please do not hesitate to contact me.    Sincerely,    Aracely Cavazos RN

## 2024-01-05 NOTE — ANESTHESIA POSTPROCEDURE EVALUATION
Anesthesia Post Evaluation    Patient: Princess Colindres    Procedure(s) Performed: Procedure(s) (LRB):  CHOLECYSTECTOMY, LAPAROSCOPIC, WITH CHOLANGIOGRAM (N/A)    Final Anesthesia Type: spinal      Patient location during evaluation: PACU  Patient participation: Yes- Able to Participate  Level of consciousness: awake and alert, awake and oriented  Post-procedure vital signs: reviewed and stable  Pain management: adequate  Airway patency: patent  ALDA mitigation strategies: Preoperative initiation of continuous positive airway pressure (CPAP) or non-invasive positive pressure ventilation (NIPPV), Multimodal analgesia and Use of major conduction anesthesia (spinal/epidural) or peripheral nerve block  PONV status at discharge: No PONV  Anesthetic complications: no      Cardiovascular status: blood pressure returned to baseline  Respiratory status: unassisted, room air and spontaneous ventilation  Hydration status: euvolemic  Follow-up not needed.              Vitals Value Taken Time   /116 01/05/24 1511   Temp 98.4 01/05/24 1511   Pulse 99 01/05/24 1511   Resp 15 01/05/24 1511   SpO2 95 % 01/05/24 1511   Vitals shown include unvalidated device data.      No case tracking events are documented in the log.      Pain/Fermin Score: No data recorded

## 2024-01-05 NOTE — DISCHARGE SUMMARY
[facility]  Discharge Note  Short Stay    Procedure(s) (LRB):  CHOLECYSTECTOMY, LAPAROSCOPIC, WITH CHOLANGIOGRAM (N/A)      OUTCOME: Patient tolerated treatment/procedure well without complication and is now ready for discharge.    DISPOSITION: Home or Self Care    FINAL DIAGNOSIS:  Chronic cholecystitis laparoscopic cholecystectomy with normal intraoperative cholangiography    FOLLOWUP: In clinic 1 week    DISCHARGE INSTRUCTIONS:    Discharge Procedure Orders   Diet general        TIME SPENT ON DISCHARGE:  5 minutes

## 2024-01-12 LAB
BEAKER SEE SCANNED REPORT: NORMAL
BEAKER SEE SCANNED REPORT: NORMAL

## 2024-01-16 DIAGNOSIS — G47.00 INSOMNIA, UNSPECIFIED TYPE: ICD-10-CM

## 2024-01-16 DIAGNOSIS — M79.7 FIBROMYALGIA: ICD-10-CM

## 2024-01-16 DIAGNOSIS — I10 PRIMARY HYPERTENSION: Chronic | ICD-10-CM

## 2024-01-16 RX ORDER — CYCLOBENZAPRINE HCL 10 MG
10 TABLET ORAL 2 TIMES DAILY PRN
Qty: 60 TABLET | Refills: 0 | Status: SHIPPED | OUTPATIENT
Start: 2024-01-16 | End: 2024-02-14 | Stop reason: SDUPTHER

## 2024-01-16 RX ORDER — CLONIDINE HYDROCHLORIDE 0.3 MG/1
0.3 TABLET ORAL NIGHTLY
Qty: 30 TABLET | Refills: 0 | Status: SHIPPED | OUTPATIENT
Start: 2024-01-16 | End: 2024-02-14 | Stop reason: SDUPTHER

## 2024-01-17 RX ORDER — ZOLPIDEM TARTRATE 10 MG/1
10 TABLET ORAL NIGHTLY PRN
Qty: 30 TABLET | Refills: 0 | Status: SHIPPED | OUTPATIENT
Start: 2024-01-17 | End: 2024-02-19 | Stop reason: SDUPTHER

## 2024-01-22 NOTE — PROGRESS NOTES
Chief Complaint:  Contraception (Mirena Removal. )    History of Present Illness:  Princess Colindres is a 28 y.o.  who presents today for IUD removal.      LMP: no cycle w/ Mirena  Frequency: n/a  Cycle length: n/a  Flow: n/a  Intermenstrual bleeding: No  Postcoital bleeding: No  Dysmenorrhea: n/a  Sexually active: not currently  Dyspareunia: no  Contraception: Mirena, 8/3/23  H/o STI: no  Last pap: 23 NIL  H/o abnl pap: no  Colposcopy: no  Gardasil: 0/3  MMG: n/a  H/o abnl MMG: n/a  Colonoscopy: n/a      Review of Systems:  General/Constitutional: Chills denies. Fatigue/weakness denies. Fever denies. Night sweats denies. Hot flashes denies.  Gynecologic: Irregular menses denies. Heavy bleeding denies. Painful menses denies. Vaginal discharge denies. Vaginal odor denies. Vaginal itching/Irritation denies. Vaginal lesion denies.  Pelvic pain denies. Decreased libido denies. Vulvar lesion denies. Prolapse of genital organs denies. Painful intercourse denies. Postcoital bleeding denies.   Psychiatric: Mood lability denies. Depressed mood denies. Suicidal thoughts denies. Anxiety denies. Overwhelmed denies. Appetite normal. Energy level normal.     OB History    Para Term  AB Living   1 1 1 0 0 1   SAB IAB Ectopic Multiple Live Births   0 0 0 0 1      # 1 - Date: 18, Sex: Male, Weight: 3.147 kg (6 lb 15 oz), GA: 38w0d, Delivery: , Low Transverse, Apgar1: None, Apgar5: None, Living: Living, Birth Comments: None      Past Medical History:   Diagnosis Date    Acute angle-closure glaucoma     bilateral    Asthma     Cervical radiculopathy     Family history of diabetes mellitus     Fibromyalgia     BRITNEY (generalized anxiety disorder)     Hypertension     Insomnia     Migraine headache     Ovarian cyst     PCOS (polycystic ovarian syndrome)          Current Outpatient Medications:     albuterol (PROVENTIL/VENTOLIN HFA) 90 mcg/actuation inhaler, Inhale 2 puffs into the lungs., Disp: ,  Rfl:     cariprazine (VRAYLAR) 1.5 mg Cap, Take 1 capsule (1.5 mg total) by mouth once daily., Disp: 30 capsule, Rfl: 11    citalopram (CELEXA) 40 MG tablet, Take 1 tablet (40 mg total) by mouth once daily., Disp: 30 tablet, Rfl: 11    cloNIDine (CATAPRES) 0.3 MG tablet, Take 1 tablet (0.3 mg total) by mouth every evening., Disp: 30 tablet, Rfl: 0    cyclobenzaprine (FLEXERIL) 10 MG tablet, Take 1 tablet (10 mg total) by mouth 2 (two) times daily as needed for Muscle spasms., Disp: 60 tablet, Rfl: 0    FLONASE ALLERGY RELIEF 50 mcg/actuation nasal spray, 1 spray by Each Nostril route 2 (two) times daily., Disp: , Rfl:     gabapentin (NEURONTIN) 300 MG capsule, Take 1 capsule (300 mg total) by mouth 3 (three) times daily., Disp: 90 capsule, Rfl: 0    hydroCHLOROthiazide (HYDRODIURIL) 12.5 MG Tab, Take 12.5 mg by mouth once daily., Disp: , Rfl:     ibuprofen 20 mg/mL oral liquid, SMARTSI Milliliter(s) By Mouth Every 6 Hours PRN, Disp: , Rfl:     lisinopriL (PRINIVIL,ZESTRIL) 30 MG tablet, Take 1 tablet (30 mg total) by mouth once daily., Disp: 30 tablet, Rfl: 11    naproxen sodium (ANAPROX) 220 MG tablet, Take 220 mg by mouth daily as needed., Disp: , Rfl:     promethazine (PHENERGAN) 25 MG tablet, Take 1 tablet (25 mg total) by mouth every 6 (six) hours as needed for Nausea., Disp: 15 tablet, Rfl: 0    UBRELVY 100 mg tablet, Take 1 tablet (100 mg total) by mouth daily as needed for Migraine., Disp: 20 tablet, Rfl: 0    zolpidem (AMBIEN) 10 mg Tab, Take 1 tablet (10 mg total) by mouth nightly as needed (Insomnia)., Disp: 30 tablet, Rfl: 0    ZYRTEC 10 mg tablet, Take 10 mg by mouth., Disp: , Rfl:     Current Facility-Administered Medications:     onabotulinumtoxina injection 200 Units, 200 Units, Intramuscular, 1 time in Clinic/HOD, Mary Fried MD    Review of patient's allergies indicates:   Allergen Reactions    Codeine Anxiety, Itching, Nausea Only, Rash and Shortness Of Breath     Other reaction(s): Other  "(See Comments)  headaches         Social History     Socioeconomic History    Marital status: Single   Tobacco Use    Smoking status: Never    Smokeless tobacco: Never   Substance and Sexual Activity    Alcohol use: Not Currently    Drug use: Never    Sexual activity: Not Currently     Partners: Male     Birth control/protection: I.U.D.       Physical Exam:  BP (!) 142/98   Pulse 98   Temp 97.7 °F (36.5 °C)   Ht 5' 2" (1.575 m)   Wt 119.6 kg (263 lb 9.6 oz)   BMI 48.21 kg/m²     Chaperone present.    Constitutional: General appearance: healthy, well-nourished and well-developed  Psychiatric: Orientation to time, place and person. Normal mood and affect and active, alert  Abdomen: Auscultation/Inspection/Palpation: No tenderness or masses. Soft, nondistended   Female Genitalia:    Vulva: no masses, atrophy or lesions    Bladder/Urethra: no urethral discharge or mass, normal meatus, bladder non-distended.    Vagina: no tenderness, erythema, cystocele, rectocele, abnormal vaginal discharge, or vesicle(s) or ulcers     Cervix: no discharge or cervical motion tenderness and grossly normal    Uterus: normal size and shape and midline, non-tender, and no uterine prolapse.    Adnexa/Parametria: no parametrial tenderness or mass, no adnexal tenderness or ovarian mass.    IUD Removal:  Pelvic examination was normal. Pap smear is current. Urine pregnancy test negative.  With the patient in the dorsal lithotomy position, a speculum was placed into vagina.  The strings were easily grasped with: Ring Forceps.  The Mirena IUD was removed and shown to the patient.  Patient tolerated procedure well.      Assessment/Plan:  1. Encounter for IUD removal   Patient tolerated well.     We discussed possible cramping and bleeding over the next few days.     Patient informed that return to fertility is immediate.     Declines to discuss other contraceptive options  Patient encouraged to take PNV daily.   H/o oligomenorrhea prior to " IUD, educated on unopposed estrogen if cycles do not reurn following Mirena removal  Follow up in 4 months  Not sexually active  Discussed with patient contraceptive options including natural family planning, barrier, oral contraceptives, patch, NuvaRing, Depo-Provera, Nexplanon, IUDs, abstinence.       Safe sex education     Discussed with patient that birth control options discussed above do not protect against STDs.    Patient declines       2. Elevated BP  - fu with FNP Florencia  - states nervous

## 2024-01-24 ENCOUNTER — PROCEDURE VISIT (OUTPATIENT)
Dept: OBSTETRICS AND GYNECOLOGY | Facility: CLINIC | Age: 29
End: 2024-01-24
Payer: MEDICAID

## 2024-01-24 VITALS
SYSTOLIC BLOOD PRESSURE: 136 MMHG | HEART RATE: 88 BPM | BODY MASS INDEX: 48.51 KG/M2 | WEIGHT: 263.63 LBS | DIASTOLIC BLOOD PRESSURE: 84 MMHG | TEMPERATURE: 98 F | HEIGHT: 62 IN | RESPIRATION RATE: 18 BRPM

## 2024-01-24 DIAGNOSIS — Z30.432 ENCOUNTER FOR IUD REMOVAL: Primary | ICD-10-CM

## 2024-01-24 PROCEDURE — 36415 COLL VENOUS BLD VENIPUNCTURE: CPT | Performed by: NURSE PRACTITIONER

## 2024-01-24 PROCEDURE — 84439 ASSAY OF FREE THYROXINE: CPT | Performed by: NURSE PRACTITIONER

## 2024-01-24 PROCEDURE — 84443 ASSAY THYROID STIM HORMONE: CPT | Performed by: NURSE PRACTITIONER

## 2024-01-24 PROCEDURE — 82306 VITAMIN D 25 HYDROXY: CPT | Performed by: NURSE PRACTITIONER

## 2024-01-24 PROCEDURE — 58301 REMOVE INTRAUTERINE DEVICE: CPT | Mod: ,,, | Performed by: OBSTETRICS & GYNECOLOGY

## 2024-01-24 PROCEDURE — 80053 COMPREHEN METABOLIC PANEL: CPT | Performed by: NURSE PRACTITIONER

## 2024-01-24 PROCEDURE — 85025 COMPLETE CBC W/AUTO DIFF WBC: CPT | Performed by: NURSE PRACTITIONER

## 2024-01-24 RX ORDER — TRIPROLIDINE/PSEUDOEPHEDRINE 2.5MG-60MG
TABLET ORAL
COMMUNITY
Start: 2024-01-05

## 2024-01-30 ENCOUNTER — OFFICE VISIT (OUTPATIENT)
Dept: FAMILY MEDICINE | Facility: CLINIC | Age: 29
End: 2024-01-30
Payer: MEDICAID

## 2024-01-30 VITALS
HEART RATE: 100 BPM | BODY MASS INDEX: 48.76 KG/M2 | HEIGHT: 62 IN | WEIGHT: 265 LBS | OXYGEN SATURATION: 99 % | SYSTOLIC BLOOD PRESSURE: 138 MMHG | DIASTOLIC BLOOD PRESSURE: 82 MMHG | TEMPERATURE: 98 F

## 2024-01-30 DIAGNOSIS — F41.1 GENERALIZED ANXIETY DISORDER: ICD-10-CM

## 2024-01-30 DIAGNOSIS — I10 PRIMARY HYPERTENSION: Chronic | ICD-10-CM

## 2024-01-30 DIAGNOSIS — E55.9 VITAMIN D DEFICIENCY: ICD-10-CM

## 2024-01-30 DIAGNOSIS — M25.561 RIGHT ANTERIOR KNEE PAIN: ICD-10-CM

## 2024-01-30 DIAGNOSIS — E66.01 CLASS 3 SEVERE OBESITY DUE TO EXCESS CALORIES WITH SERIOUS COMORBIDITY AND BODY MASS INDEX (BMI) OF 45.0 TO 49.9 IN ADULT: ICD-10-CM

## 2024-01-30 DIAGNOSIS — R40.0 DAYTIME SOMNOLENCE: Primary | ICD-10-CM

## 2024-01-30 PROCEDURE — 3008F BODY MASS INDEX DOCD: CPT | Mod: CPTII,,, | Performed by: NURSE PRACTITIONER

## 2024-01-30 PROCEDURE — 1159F MED LIST DOCD IN RCRD: CPT | Mod: CPTII,,, | Performed by: NURSE PRACTITIONER

## 2024-01-30 PROCEDURE — 3075F SYST BP GE 130 - 139MM HG: CPT | Mod: CPTII,,, | Performed by: NURSE PRACTITIONER

## 2024-01-30 PROCEDURE — 3079F DIAST BP 80-89 MM HG: CPT | Mod: CPTII,,, | Performed by: NURSE PRACTITIONER

## 2024-01-30 PROCEDURE — 99214 OFFICE O/P EST MOD 30 MIN: CPT | Mod: ,,, | Performed by: NURSE PRACTITIONER

## 2024-01-30 PROCEDURE — 4010F ACE/ARB THERAPY RXD/TAKEN: CPT | Mod: CPTII,,, | Performed by: NURSE PRACTITIONER

## 2024-01-30 RX ORDER — PREDNISONE 20 MG/1
40 TABLET ORAL DAILY
Qty: 6 TABLET | Refills: 0 | Status: SHIPPED | OUTPATIENT
Start: 2024-01-30 | End: 2024-02-02

## 2024-01-30 NOTE — ASSESSMENT & PLAN NOTE

## 2024-01-30 NOTE — PROGRESS NOTES
"Patient ID: Princess Colindres  : 1995    Chief Complaint: Knee Pain and Results    Allergies: Patient is allergic to codeine.     History of Present Illness:  The patient is a 28 y.o.    Black or   White female who presents to clinic for follow up on Knee Pain and Results     Complains of some increased pain and some swelling of the right knee. Ongoing for the last couple months; denies injury. No instability. It is an aching type of pain, constant, worsened if she tries to bend the knee. Has been applying ice and has been taking Ibuprofen for the last couple months.     She has been complaining of profound fatigue for quite some time. Also reports "unhealthy hair." No other symptoms.   Iron studies WNL except mildly reduced iron sat at 19%.  Decreased retic (1.08), peripheral smear: microcytosis with hypochromasia.  Abnormal hemoglobin electrophoresis. E-consult with Hematology done, dx Hgb c trait; recommendation to continue workup for other causes of fatigue symptom. Sleep study pending. She was contacted and requested that she be able to do a home sleep study; they were supposed to "look into it" and contact her back but she never heard from them.   Hx of Vitamin D deficiency in past.     She has been seeing Dr. Leon at [facility].  There was concern that some of her fatigue may be hormonally related.  She does not have regular menstrual cycles and has not for years.  She had Mirena device but that was recently removed.  She tells me that the hope was that the Mirena would help regulate her hormones.  She does have some elevated prolactin levels.      Social History:  reports that she has never smoked. She has never used smokeless tobacco. She reports that she does not currently use alcohol. She reports that she does not use drugs.    Past Medical History:  has a past medical history of Acute angle-closure glaucoma, Asthma, Cervical radiculopathy, Family history of diabetes mellitus, " "Fibromyalgia, BRITNEY (generalized anxiety disorder), Hypertension, Insomnia, Migraine headache, Ovarian cyst, and PCOS (polycystic ovarian syndrome).    Current Medications:  Current Outpatient Medications   Medication Instructions    albuterol (PROVENTIL/VENTOLIN HFA) 90 mcg/actuation inhaler 2 puffs, Inhalation    citalopram (CELEXA) 40 mg, Oral, Daily    cloNIDine (CATAPRES) 0.3 mg, Oral, Nightly    cyclobenzaprine (FLEXERIL) 10 mg, Oral, 2 times daily PRN    FLONASE ALLERGY RELIEF 50 mcg/actuation nasal spray 1 spray, Each Nostril, 2 times daily    gabapentin (NEURONTIN) 300 mg, Oral, 3 times daily    hydroCHLOROthiazide (HYDRODIURIL) 12.5 mg, Oral, Daily    ibuprofen 20 mg/mL oral liquid SMARTSI Milliliter(s) By Mouth Every 6 Hours PRN    lisinopriL (PRINIVIL,ZESTRIL) 30 mg, Oral, Daily    naproxen sodium (ANAPROX) 220 mg, Oral, Daily PRN    predniSONE (DELTASONE) 40 mg, Oral, Daily    promethazine (PHENERGAN) 25 mg, Oral, Every 6 hours PRN    UBRELVY 100 mg, Oral, Daily PRN    VRAYLAR 1.5 mg, Oral, Daily    zolpidem (AMBIEN) 10 mg, Oral, Nightly PRN    ZyrTEC 10 mg, Oral       Review of Systems   See hPI    Visit Vitals  /82 (BP Location: Left arm)   Pulse 100   Temp 98.1 °F (36.7 °C) (Temporal)   Ht 5' 2" (1.575 m)   Wt 120.2 kg (265 lb)   SpO2 99%   BMI 48.47 kg/m²       Physical Exam  Vitals reviewed.   Constitutional:       Appearance: Normal appearance. She is obese.   Eyes:      Conjunctiva/sclera: Conjunctivae normal.   Cardiovascular:      Heart sounds: Normal heart sounds.   Pulmonary:      Breath sounds: Normal breath sounds.   Skin:     General: Skin is warm and dry.   Neurological:      Mental Status: She is oriented to person, place, and time.          Labs Reviewed:  Chemistry:  Lab Results   Component Value Date     2024    K 4.2 2024    CHLORIDE 103 2024    BUN 11.0 2024    CREATININE 0.75 2024    EGFRNORACEVR >90 2024    GLUCOSE 95 2024    " CALCIUM 9.2 01/24/2024    ALKPHOS 79 01/24/2024    LABPROT 7.1 01/24/2024    ALBUMIN 4.1 01/24/2024    AST 24 01/24/2024    ALT 19 01/24/2024    HBLNQAGR63CR 28.00 (L) 12/15/2021    TSH 0.626 01/24/2024    GBGZHI5GNBZ 0.96 01/24/2024        Lab Results   Component Value Date    HGBA1C 5.0 06/28/2023        Hematology:  Lab Results   Component Value Date    WBC 6.79 01/24/2024    RBC 5.42 (H) 01/24/2024    HGB 13.1 01/24/2024    HCT 38.7 01/24/2024    MCV 71.4 (L) 01/24/2024    MCH 24.2 (L) 01/24/2024    MCHC 33.9 01/24/2024    RDW 14.1 01/24/2024     01/24/2024    MPV 12.4 01/24/2024       Lipid Panel:  Lab Results   Component Value Date    CHOL 199 06/28/2023    HDL 52 06/28/2023    DLDL 110.9 (H) 06/28/2023    LDLCALC 137 (H) 12/01/2022    TRIG 99 06/28/2023          Assessment & Plan:  1. Daytime somnolence  Assessment & Plan:  Profoundly fatigued. Sleep study to rule out ALDA.   Will order a home sleep study as patient will be unable to do study in clinic secondary to anxiety.    Orders:  -     Ambulatory referral/consult to Sleep Disorders; Future; Expected date: 02/06/2024    2. Primary hypertension  Overview:  On lisinopril 30 mg daily and HCTZ 12.5 mg daily.    Assessment & Plan:  Well controlled.   Continue current medications.   Low Sodium Diet (DASH Diet - Less than 2 grams of sodium per day).  Monitor blood pressure daily and log. Report consistent numbers greater than 140/90.  Maintain healthy weight with goal BMI <30. Exercise 30 minutes per day, 5 days per week.  Smoking cessation encouraged to aid in BP reduction.        3. Class 3 severe obesity due to excess calories with serious comorbidity and body mass index (BMI) of 45.0 to 49.9 in adult  Assessment & Plan:  Body mass index is 48.47 kg/m².  Goal BMI <30.  Exercise 5 times a week for 30 minutes per day.  Avoid soda, simple sugars, excessive rice, potatoes or bread. Limit fast foods and fried foods.  Choose complex carbs in moderation  (example: green vegetables, beans, oatmeal). Eat plenty of fresh fruits and vegetables with lean meats daily.  Do not skip meals. Eat a balanced portion size.  Avoid fad diets. Consider permanent healthy life style changes.         4. Generalized anxiety disorder  Overview:  On Celexa and Vraylar.       5. Right anterior knee pain  -     X-Ray Knee AP LAT with [place] Right; Future; Expected date: 01/30/2024  -     predniSONE (DELTASONE) 20 MG tablet; Take 2 tablets (40 mg total) by mouth once daily. for 3 days  Dispense: 6 tablet; Refill: 0    6. Vitamin D deficiency  -     Vitamin D; Future; Expected date: 01/30/2024         Future Appointments   Date Time Provider Department Center   2/21/2024  3:30 PM BOTOX, [facility] NEUROLOGY [facility] NEURO [facility]    6/26/2024  9:20 AM Emily Leon [place] [facility] [place] [facility]       Follow up for 1 mo f/u. Call sooner if needed.    GAYATRI Burrows    Lab Frequency Next Occurrence   Glucose Tolerance, 2 Hours Once 06/14/2023   Ambulatory referral/consult to Allergy Once 11/06/2023   Ambulatory referral/consult to Sleep Disorders Once 12/05/2023   Prolactin Once 01/03/2024   Ambulatory referral/consult to General Surgery Once 01/11/2024

## 2024-01-30 NOTE — ASSESSMENT & PLAN NOTE
Well controlled.   Continue current medications.   Low Sodium Diet (DASH Diet - Less than 2 grams of sodium per day).  Monitor blood pressure daily and log. Report consistent numbers greater than 140/90.  Maintain healthy weight with goal BMI <30. Exercise 30 minutes per day, 5 days per week.  Smoking cessation encouraged to aid in BP reduction.

## 2024-01-30 NOTE — ASSESSMENT & PLAN NOTE
Profoundly fatigued. Sleep study to rule out ALDA.   Will order a home sleep study as patient will be unable to do study in clinic secondary to anxiety.

## 2024-01-31 ENCOUNTER — PATIENT MESSAGE (OUTPATIENT)
Dept: FAMILY MEDICINE | Facility: CLINIC | Age: 29
End: 2024-01-31

## 2024-01-31 DIAGNOSIS — E55.9 VITAMIN D DEFICIENCY: Primary | ICD-10-CM

## 2024-01-31 LAB — DEPRECATED CALCIDIOL+CALCIFEROL SERPL-MC: 20 NG/ML (ref 30–80)

## 2024-01-31 RX ORDER — ASPIRIN 325 MG
50000 TABLET, DELAYED RELEASE (ENTERIC COATED) ORAL
Qty: 12 CAPSULE | Refills: 1 | Status: SHIPPED | OUTPATIENT
Start: 2024-01-31

## 2024-02-14 DIAGNOSIS — G43.909 MIGRAINE WITHOUT STATUS MIGRAINOSUS, NOT INTRACTABLE, UNSPECIFIED MIGRAINE TYPE: ICD-10-CM

## 2024-02-14 DIAGNOSIS — M79.7 FIBROMYALGIA: ICD-10-CM

## 2024-02-14 DIAGNOSIS — I10 PRIMARY HYPERTENSION: Chronic | ICD-10-CM

## 2024-02-15 RX ORDER — CYCLOBENZAPRINE HCL 10 MG
10 TABLET ORAL 2 TIMES DAILY PRN
Qty: 60 TABLET | Refills: 5 | Status: SHIPPED | OUTPATIENT
Start: 2024-02-15

## 2024-02-15 RX ORDER — CLONIDINE HYDROCHLORIDE 0.3 MG/1
0.3 TABLET ORAL NIGHTLY
Qty: 30 TABLET | Refills: 11 | Status: SHIPPED | OUTPATIENT
Start: 2024-02-15

## 2024-02-15 RX ORDER — UBROGEPANT 100 MG/1
100 TABLET ORAL DAILY PRN
Qty: 20 TABLET | Refills: 5 | Status: SHIPPED | OUTPATIENT
Start: 2024-02-15 | End: 2024-05-15 | Stop reason: SDUPTHER

## 2024-02-19 DIAGNOSIS — G47.00 INSOMNIA, UNSPECIFIED TYPE: ICD-10-CM

## 2024-02-19 RX ORDER — ZOLPIDEM TARTRATE 10 MG/1
10 TABLET ORAL NIGHTLY PRN
Qty: 30 TABLET | Refills: 0 | Status: CANCELLED | OUTPATIENT
Start: 2024-02-19

## 2024-02-19 RX ORDER — ZOLPIDEM TARTRATE 10 MG/1
10 TABLET ORAL NIGHTLY PRN
Qty: 30 TABLET | Refills: 5 | Status: SHIPPED | OUTPATIENT
Start: 2024-02-19

## 2024-02-19 NOTE — TELEPHONE ENCOUNTER
Please let her know that her sleep study looked okay, she does not have obstructive sleep apnea; so that is not the cause of her fatigue.    At this point, we worked up as far as we can go I believe.  Of course her vitamin-D level is low and she is now on cholecalciferol.  It will take a while for that vitamin-D level to improve and maybe her fatigue will improve as the level increases.    Her fatigue may be related to her diagnosis of Fibromyalgia; unfortunately there are not a lot of options for treatment there.  One of the things that are recommended for improvement in this condition is routine exercise.  She is already on cyclobenzaprine and gabapentin which are to meds we use for treatment of fibro.  Cymbalta is used a lot as well but she is already on Celexa and has been doing well on that medication I believe.  We can refer her out if she would like.

## 2024-02-20 DIAGNOSIS — G43.711 INTRACTABLE CHRONIC MIGRAINE WITHOUT AURA AND WITH STATUS MIGRAINOSUS: ICD-10-CM

## 2024-02-21 ENCOUNTER — PROCEDURE VISIT (OUTPATIENT)
Dept: NEUROLOGY | Facility: CLINIC | Age: 29
End: 2024-02-21
Payer: MEDICAID

## 2024-02-21 VITALS
TEMPERATURE: 98 F | OXYGEN SATURATION: 99 % | BODY MASS INDEX: 48.25 KG/M2 | RESPIRATION RATE: 16 BRPM | WEIGHT: 262.19 LBS | SYSTOLIC BLOOD PRESSURE: 104 MMHG | HEART RATE: 89 BPM | HEIGHT: 62 IN | DIASTOLIC BLOOD PRESSURE: 70 MMHG

## 2024-02-21 DIAGNOSIS — G43.711 INTRACTABLE CHRONIC MIGRAINE WITHOUT AURA AND WITH STATUS MIGRAINOSUS: ICD-10-CM

## 2024-02-21 DIAGNOSIS — G43.711 CHRONIC MIGRAINE WITHOUT AURA, INTRACTABLE, WITH STATUS MIGRAINOSUS: Primary | ICD-10-CM

## 2024-02-21 PROCEDURE — 64615 CHEMODENERV MUSC MIGRAINE: CPT | Mod: S$PBB,,, | Performed by: PSYCHIATRY & NEUROLOGY

## 2024-02-21 PROCEDURE — 64615 CHEMODENERV MUSC MIGRAINE: CPT | Mod: PBBFAC | Performed by: PSYCHIATRY & NEUROLOGY

## 2024-02-21 RX ADMIN — ONABOTULINUMTOXINA 200 UNITS: 200 INJECTION, POWDER, LYOPHILIZED, FOR SOLUTION INTRADERMAL; INTRAMUSCULAR at 03:02

## 2024-02-21 NOTE — PROCEDURES
[facility] Neurology Outpatient Botox Procedure Note    History of Present Illness     This is 28 y.o. female with history of morbid obesity, Chiari malformation type 1, anxiety, PCOS, insomnia, hypertension who is referred for chronic migraine without aura with bruxism.  Patient is here for Botox 3.     Age of Onset : 10 years old     Headache Description:   occipital, paracervical, stabbing, throbbing, pressure sensation, moderate to severe, lasting >24 hours to 7 days, with nausea, with photophobia and phonophobia.   Left cheek, lower jaw, gum numbness and occasional periorbital throbbing pain.  Also reports jaw clicking sensation at times.     Baseline Headache Frequency: 15 migraine headache days per month for the past 3 years.   Interval Headache Frequency:  2 migraine headache days per month     Current Prophylactic  Erenumab 70 mg once per month (12/12/2022 to present)  Lisinopril 30 mg daily  Celexa 40 mg daily  Gabapentin 300 mg 3 times a day (12/20/2022 to present)     Current Abortive  Ubrogepant 100 mg twice a day as needed (12/12/2022 to present): effective.   Cyclobenzaprine     Review of System      reviewed. stable.    Focused Exam     Reviewed.  Stable.    Assessment     This is 28 y.o. female with history of morbid obesity, Chiari malformation type 1, anxiety, PCOS, insomnia, hypertension who is referred for chronic migraine without aura with bruxism.  Patient is here for Botox 3.     Procedure     Date of procedure: 2/21/2024    Procedure: Chronic Migraine Chemodenervation with Botulinum toxin    The patient was identified and informed consent was reviewed with the patient, and we discussed the risks, benefits and alternatives. Specifically, we discussed the risks of bleeding, infection and nerve injury with worsened pain and function. Specifically, we discussed the most frequently reported adverse reactions following injection of BOTOX include headache (5%), eyelid ptosis (4%), muscular weakness (4%),  bronchitis (3%), injection-site pain (3%), musculoskeletal pain (3%), myalgia (3%), facial paresis (2%), hypertension (2%), and muscle spasms (2%). The patient verbalized an understanding of these risks and the symptoms and the potentially catastrophic consequences of this occurrence. The patient verbalized an understanding that if she should begin to have these symptoms that she should immediately go to the nearest emergency room for evaluation. The patient was then positioned. The injection sites were identified and was prepped with Alcohol. 4cc of preservative free normal saline was mixed with 200 units of Botox. A 30-gauge, 0.5 inch needle was then used to inject a total 155 units of Botox. Muscles injected as below. Patient tolerated the procedure well with no complaints.    A. : Botox dosage: 10 units in 2 sites Right: 5 Left: 5   B. Procerus Botox dosage: 5 Units in 1 site   C. Frontalis Botox dosage: 20 Units divided in 4 sites Right: 10 Left: 10   D. Temporalis Botox dosage: 40 Units divided in 8 sites Right: 20 Left: 20   E. Occipitalis Botox dosage 30 Units divided in 6 sites Right: 15 Left: 15   F. Cervical Paraspinal Botox dosage: 20 Units divided in 4 sites: Right 10 Left: 10   G. Trapezius Botox dosage: 30 Units divided in 6 sites: Right: 15 Left: 15     Total Units Injected: 155 units   Total Units discarded: 45 units     Follow Up         RTC 2 month telemedicine   RTC Botox      Mary Fried MD   [facility] General Neurology

## 2024-03-05 DIAGNOSIS — F32.A DEPRESSION, UNSPECIFIED DEPRESSION TYPE: ICD-10-CM

## 2024-03-05 RX ORDER — CARIPRAZINE 1.5 MG/1
1.5 CAPSULE, GELATIN COATED ORAL DAILY
Qty: 30 CAPSULE | Refills: 11 | Status: SHIPPED | OUTPATIENT
Start: 2024-03-05 | End: 2024-04-01

## 2024-03-16 DIAGNOSIS — M54.50 CHRONIC BILATERAL LOW BACK PAIN WITHOUT SCIATICA: ICD-10-CM

## 2024-03-16 DIAGNOSIS — G89.29 CHRONIC BILATERAL LOW BACK PAIN WITHOUT SCIATICA: ICD-10-CM

## 2024-03-18 RX ORDER — GABAPENTIN 300 MG/1
300 CAPSULE ORAL 3 TIMES DAILY
Qty: 90 CAPSULE | Refills: 0 | Status: SHIPPED | OUTPATIENT
Start: 2024-03-18 | End: 2024-05-08 | Stop reason: SDUPTHER

## 2024-04-01 ENCOUNTER — OFFICE VISIT (OUTPATIENT)
Dept: FAMILY MEDICINE | Facility: CLINIC | Age: 29
End: 2024-04-01
Payer: MEDICAID

## 2024-04-01 VITALS
BODY MASS INDEX: 48.4 KG/M2 | OXYGEN SATURATION: 96 % | SYSTOLIC BLOOD PRESSURE: 100 MMHG | HEART RATE: 80 BPM | DIASTOLIC BLOOD PRESSURE: 80 MMHG | HEIGHT: 62 IN | TEMPERATURE: 98 F | WEIGHT: 263 LBS

## 2024-04-01 DIAGNOSIS — Z13.31 POSITIVE DEPRESSION SCREENING: ICD-10-CM

## 2024-04-01 DIAGNOSIS — F33.1 MODERATE EPISODE OF RECURRENT MAJOR DEPRESSIVE DISORDER: Primary | ICD-10-CM

## 2024-04-01 PROCEDURE — 3074F SYST BP LT 130 MM HG: CPT | Mod: CPTII,,, | Performed by: NURSE PRACTITIONER

## 2024-04-01 PROCEDURE — 4010F ACE/ARB THERAPY RXD/TAKEN: CPT | Mod: CPTII,,, | Performed by: NURSE PRACTITIONER

## 2024-04-01 PROCEDURE — 1159F MED LIST DOCD IN RCRD: CPT | Mod: CPTII,,, | Performed by: NURSE PRACTITIONER

## 2024-04-01 PROCEDURE — 99213 OFFICE O/P EST LOW 20 MIN: CPT | Mod: ,,, | Performed by: NURSE PRACTITIONER

## 2024-04-01 PROCEDURE — 3079F DIAST BP 80-89 MM HG: CPT | Mod: CPTII,,, | Performed by: NURSE PRACTITIONER

## 2024-04-01 PROCEDURE — 3008F BODY MASS INDEX DOCD: CPT | Mod: CPTII,,, | Performed by: NURSE PRACTITIONER

## 2024-04-01 PROCEDURE — 1160F RVW MEDS BY RX/DR IN RCRD: CPT | Mod: CPTII,,, | Performed by: NURSE PRACTITIONER

## 2024-04-01 RX ORDER — CARIPRAZINE 3 MG/1
3 CAPSULE, GELATIN COATED ORAL DAILY
Qty: 14 CAPSULE | Refills: 0 | COMMUNITY
Start: 2024-04-01 | End: 2024-04-24 | Stop reason: SDUPTHER

## 2024-04-01 RX ORDER — CARIPRAZINE 1.5 MG/1
1.5 CAPSULE, GELATIN COATED ORAL DAILY
Qty: 14 CAPSULE | Refills: 0 | COMMUNITY
Start: 2024-04-01 | End: 2024-04-24 | Stop reason: ALTCHOICE

## 2024-04-01 NOTE — PROGRESS NOTES
Patient ID: Princess Colindres  : 1995    Chief Complaint: Depression    Allergies: Patient is allergic to codeine.     History of Present Illness:  The patient is a 28 y.o.    Black or   White female who presents to clinic for follow up on Depression     Complains of increased tearfulness, depression, no desire to do anything over the last couple weeks. She has been staying in bed, not going to work. She denies any issues at home , no stressful life events, no changes that she thinks is fueling this depression. She has been on Celexa for a long time and it has always worked. At one point last year we added Vraylar; however, it was only at the low dose and she stopped taking it before we titrated up. She reports that she had an adverse effect to multiple antidepressants in the past (including Zoloft, Effexor, Wellbutrin). She has some morbid ruminations but adamantly denies SI/HI.     Social History:  reports that she has never smoked. She has never used smokeless tobacco. She reports that she does not currently use alcohol. She reports that she does not use drugs.    Past Medical History:  has a past medical history of Acute angle-closure glaucoma, Asthma, Cervical radiculopathy, Family history of diabetes mellitus, Fibromyalgia, BRITNEY (generalized anxiety disorder), Hypertension, Insomnia, Migraine headache, Ovarian cyst, and PCOS (polycystic ovarian syndrome).    Current Medications:  Current Outpatient Medications   Medication Instructions    albuterol (PROVENTIL/VENTOLIN HFA) 90 mcg/actuation inhaler 2 puffs, Inhalation    cholecalciferol (vitamin D3) 50,000 Units, Oral, Every 7 days    citalopram (CELEXA) 40 mg, Oral, Daily    cloNIDine (CATAPRES) 0.3 mg, Oral, Nightly    cyclobenzaprine (FLEXERIL) 10 mg, Oral, 2 times daily PRN    FLONASE ALLERGY RELIEF 50 mcg/actuation nasal spray 1 spray, Each Nostril, 2 times daily    gabapentin (NEURONTIN) 300 mg, Oral, 3 times daily     "hydroCHLOROthiazide (HYDRODIURIL) 12.5 mg, Oral, Daily    ibuprofen 20 mg/mL oral liquid SMARTSI Milliliter(s) By Mouth Every 6 Hours PRN    lisinopriL (PRINIVIL,ZESTRIL) 30 mg, Oral, Daily    naproxen sodium (ANAPROX) 220 mg, Oral, Daily PRN    UBRELVY 100 mg, Oral, Daily PRN    VRAYLAR 1.5 mg, Oral, Daily, For 2 weeks then increase to 3 mg dose    VRAYLAR 3 mg, Oral, Daily, Take 1.5 mg dose x 2 weeks, then increase to 3 mg dose    zolpidem (AMBIEN) 10 mg, Oral, Nightly PRN    ZyrTEC 10 mg, Oral       Review of Systems   See HPI    Visit Vitals  /80 (BP Location: Left arm)   Pulse 80   Temp 97.5 °F (36.4 °C) (Temporal)   Ht 5' 2" (1.575 m)   Wt 119.3 kg (263 lb)   SpO2 96%   BMI 48.10 kg/m²       Physical Exam  Vitals reviewed.   Constitutional:       Appearance: Normal appearance.   Cardiovascular:      Heart sounds: Normal heart sounds.   Pulmonary:      Breath sounds: Normal breath sounds.   Skin:     General: Skin is warm and dry.   Neurological:      Mental Status: She is oriented to person, place, and time.          Labs Reviewed:  Chemistry:  Lab Results   Component Value Date     2024    K 4.2 2024    CHLORIDE 103 2024    BUN 11.0 2024    CREATININE 0.75 2024    EGFRNORACEVR >90 2024    GLUCOSE 95 2024    CALCIUM 9.2 2024    ALKPHOS 79 2024    LABPROT 7.1 2024    ALBUMIN 4.1 2024    AST 24 2024    ALT 19 2024    BRJHXWLO05WI 20.0 (L) 2024    TSH 0.626 2024    UUHLOL5CBCO 0.96 2024        Hematology:  Lab Results   Component Value Date    WBC 6.79 2024    RBC 5.42 (H) 2024    HGB 13.1 2024    HCT 38.7 2024    MCV 71.4 (L) 2024    MCH 24.2 (L) 2024    MCHC 33.9 2024    RDW 14.1 2024     2024    MPV 12.4 2024           Assessment & Plan:  1. Moderate episode of recurrent major depressive disorder  Comments:  Restart Vraylar 1.5 mg " daily and titrate up in 2 weeks to 3 mg daily.  Fu in 3 weeks  Overview:  On Celexa 40 mg daily.   Vraylar 1.5 mg added (12/04/2023)    Orders:  -     cariprazine (VRAYLAR) 1.5 mg Cap; Take 1 capsule (1.5 mg total) by mouth Daily. For 2 weeks then increase to 3 mg dose  Dispense: 14 capsule; Refill: 0  -     cariprazine (VRAYLAR) 3 mg Cap; Take 1 capsule (3 mg total) by mouth Daily. Take 1.5 mg dose x 2 weeks, then increase to 3 mg dose  Dispense: 14 capsule; Refill: 0    2. Positive depression screening  Comments:  I have reviewed the positive depression score which warrants active treatment with psychotherapy and/or medications.         Future Appointments   Date Time Provider Department Center   4/22/2024  3:00 PM Florencia Em FNP-C [facility] LINCOLN Khan [facility]   4/25/2024  9:10 AM LAB, [facility] LABORATORY DRAW STATION [facility] LUCY Khan [facility]   5/1/2024  8:00 [facility]   5/6/2024  9:30 AM Mary Fried MD [facility] NEURO [place]    5/22/2024  2:00 PM BOTOX, [facility] NEUROLOGY [facility] NEURO [place]    6/26/2024  9:20 AM Emily Leon MD [facility] OBLACHELLE [facility]       Follow up for 3 week follow up on depression. Call sooner if needed.    GAYATRI Burrows    Lab Frequency Next Occurrence   Glucose Tolerance, 2 Hours Once 06/14/2023   Ambulatory referral/consult to Allergy Once 11/06/2023   Ambulatory referral/consult to Sleep Disorders Once 12/05/2023   Prolactin Once 01/03/2024   Ambulatory referral/consult to General Surgery Once 01/11/2024   Ambulatory referral/consult to Sleep Disorders Once 02/06/2024   Vitamin D Once 04/30/2024

## 2024-04-18 ENCOUNTER — OFFICE VISIT (OUTPATIENT)
Dept: OBSTETRICS AND GYNECOLOGY | Facility: CLINIC | Age: 29
End: 2024-04-18
Payer: MEDICAID

## 2024-04-18 VITALS
TEMPERATURE: 98 F | WEIGHT: 268.19 LBS | SYSTOLIC BLOOD PRESSURE: 142 MMHG | HEIGHT: 62 IN | BODY MASS INDEX: 49.35 KG/M2 | DIASTOLIC BLOOD PRESSURE: 98 MMHG

## 2024-04-18 DIAGNOSIS — R10.2 PELVIC PAIN: Primary | ICD-10-CM

## 2024-04-18 DIAGNOSIS — R79.89 ELEVATED PROLACTIN LEVEL: ICD-10-CM

## 2024-04-18 DIAGNOSIS — R03.0 ELEVATED BLOOD PRESSURE READING: ICD-10-CM

## 2024-04-18 DIAGNOSIS — N91.5 OLIGOMENORRHEA, UNSPECIFIED TYPE: ICD-10-CM

## 2024-04-18 DIAGNOSIS — E28.2 PCOS (POLYCYSTIC OVARIAN SYNDROME): ICD-10-CM

## 2024-04-18 DIAGNOSIS — R53.83 FATIGUE, UNSPECIFIED TYPE: ICD-10-CM

## 2024-04-18 LAB
B-HCG UR QL: NEGATIVE
BILIRUB UR QL STRIP: NEGATIVE
CTP QC/QA: YES
GLUCOSE UR QL STRIP: NEGATIVE
KETONES UR QL STRIP: NEGATIVE
LEUKOCYTE ESTERASE UR QL STRIP: NEGATIVE
PH, POC UA: 6
POC BLOOD, URINE: POSITIVE
POC NITRATES, URINE: NEGATIVE
PROT UR QL STRIP: NEGATIVE
SP GR UR STRIP: 1.02 (ref 1–1.03)
UROBILINOGEN UR STRIP-ACNC: 1 (ref 0.1–1.1)

## 2024-04-18 PROCEDURE — 3080F DIAST BP >= 90 MM HG: CPT | Mod: CPTII,,, | Performed by: OBSTETRICS & GYNECOLOGY

## 2024-04-18 PROCEDURE — 3008F BODY MASS INDEX DOCD: CPT | Mod: CPTII,,, | Performed by: OBSTETRICS & GYNECOLOGY

## 2024-04-18 PROCEDURE — 81025 URINE PREGNANCY TEST: CPT | Mod: ,,, | Performed by: OBSTETRICS & GYNECOLOGY

## 2024-04-18 PROCEDURE — 87661 TRICHOMONAS VAGINALIS AMPLIF: CPT | Performed by: OBSTETRICS & GYNECOLOGY

## 2024-04-18 PROCEDURE — 3077F SYST BP >= 140 MM HG: CPT | Mod: CPTII,,, | Performed by: OBSTETRICS & GYNECOLOGY

## 2024-04-18 PROCEDURE — 87591 N.GONORRHOEAE DNA AMP PROB: CPT | Performed by: OBSTETRICS & GYNECOLOGY

## 2024-04-18 PROCEDURE — 81003 URINALYSIS AUTO W/O SCOPE: CPT | Mod: QW,,, | Performed by: OBSTETRICS & GYNECOLOGY

## 2024-04-18 PROCEDURE — 4010F ACE/ARB THERAPY RXD/TAKEN: CPT | Mod: CPTII,,, | Performed by: OBSTETRICS & GYNECOLOGY

## 2024-04-18 PROCEDURE — 99214 OFFICE O/P EST MOD 30 MIN: CPT | Mod: ,,, | Performed by: OBSTETRICS & GYNECOLOGY

## 2024-04-18 PROCEDURE — 1159F MED LIST DOCD IN RCRD: CPT | Mod: CPTII,,, | Performed by: OBSTETRICS & GYNECOLOGY

## 2024-04-18 NOTE — PROGRESS NOTES
Chief Complaint:  Pelvic Pain    History of Present Illness:  Patient is a 28 y.o.  with PMH of PCOS presents c/o a three week history of left lower quadrant pain. Unsure how often has bowel movements.     Pelvic pain  previously controlled with Mirena IUD, which was removed due to RUQ pain 2024. Cont to have RUQ pain, s/p cholecystectomy 24.    Desires Mirnea. States felt emotional and pelvic pain better with IUD. See previous notes prior to  insert.       LMP: 24  Frequency: first cycle since IUD removal  Cycle length: 2 days  Flow: light  Intermenstrual bleeding: No  Postcoital bleeding: No  Dysmenorrhea: n/a  Sexually active: not currently  Dyspareunia: no  Contraception: none  H/o STI: no  Last pap: 23 NIL  H/o abnl pap: no  Colposcopy: no  Gardasil: 0/3  MMG: n/a  H/o abnl MMG: n/a  Colonoscopy: n/a      Review of systems:  General/Constitutional: Chills denies. Fatigue/weakness denies. Fever denies. Night sweats denies. Hot flashes denies  Breast: Dimpling denies. Breast mass denies. Breast pain/tenderness denies. Nipple discharge denies. Puckering denies.  Gastrointestinal: Abdominal pain denies. Blood in stool denies. Constipation denies. Diarrhea denies. Heartburn denies. Nausea denies. Vomiting denies   Genitourinary: Incontinence denies. Blood in urine denies. Frequent urination denies. Urgency denies. Painful urination denies. Nocturia denies   Gynecologic: Irregular menses denies. Heavy bleeding denies. Painful menses denies. Vaginal discharge denies. Vaginal odor denies. Vaginal itching/Irritation denies. Vaginal lesion denies.  Pelvic pain +. Decreased libido denies. Vulvar lesion denies. Prolapse of genital organs denies. Painful intercourse denies. Postcoital bleeding denies   Psychiatric: Mood lability denies. Depressed mood denies. Suicidal thoughts denies. Anxiety denies. Overwhelmed denies. Appetite normal. Energy level normal.     OB History    Para Term   AB Living   1 1 1 0 0 1   SAB IAB Ectopic Multiple Live Births   0 0 0 0 1      # 1 - Date: 18, Sex: Male, Weight: 3.147 kg (6 lb 15 oz), GA: 38w0d, Type: , Low Transverse, Apgar1: None, Apgar5: None, Living: Living, Birth Comments: None      Past Medical History:   Diagnosis Date    Acute angle-closure glaucoma     bilateral    Asthma     Cervical radiculopathy     Family history of diabetes mellitus     Fibromyalgia     BRITNEY (generalized anxiety disorder)     Hypertension     Insomnia     Migraine headache     Ovarian cyst     PCOS (polycystic ovarian syndrome)        Current Outpatient Medications:     albuterol (PROVENTIL/VENTOLIN HFA) 90 mcg/actuation inhaler, Inhale 2 puffs into the lungs., Disp: , Rfl:     cariprazine (VRAYLAR) 1.5 mg Cap, Take 1 capsule (1.5 mg total) by mouth Daily. For 2 weeks then increase to 3 mg dose, Disp: 14 capsule, Rfl: 0    cariprazine (VRAYLAR) 3 mg Cap, Take 1 capsule (3 mg total) by mouth Daily. Take 1.5 mg dose x 2 weeks, then increase to 3 mg dose, Disp: 14 capsule, Rfl: 0    cholecalciferol, vitamin D3, 1,250 mcg (50,000 unit) capsule, Take 1 capsule (50,000 Units total) by mouth every 7 days., Disp: 12 capsule, Rfl: 1    citalopram (CELEXA) 40 MG tablet, Take 1 tablet (40 mg total) by mouth once daily., Disp: 30 tablet, Rfl: 11    cloNIDine (CATAPRES) 0.3 MG tablet, Take 1 tablet (0.3 mg total) by mouth every evening., Disp: 30 tablet, Rfl: 11    cyclobenzaprine (FLEXERIL) 10 MG tablet, Take 1 tablet (10 mg total) by mouth 2 (two) times daily as needed for Muscle spasms., Disp: 60 tablet, Rfl: 5    FLONASE ALLERGY RELIEF 50 mcg/actuation nasal spray, 1 spray by Each Nostril route 2 (two) times daily., Disp: , Rfl:     gabapentin (NEURONTIN) 300 MG capsule, Take 1 capsule (300 mg total) by mouth 3 (three) times daily., Disp: 90 capsule, Rfl: 0    hydroCHLOROthiazide (HYDRODIURIL) 12.5 MG Tab, Take 12.5 mg by mouth once daily., Disp: , Rfl:     ibuprofen  "20 mg/mL oral liquid, SMARTSI Milliliter(s) By Mouth Every 6 Hours PRN, Disp: , Rfl:     lisinopriL (PRINIVIL,ZESTRIL) 30 MG tablet, Take 1 tablet (30 mg total) by mouth once daily., Disp: 30 tablet, Rfl: 11    naproxen sodium (ANAPROX) 220 MG tablet, Take 220 mg by mouth daily as needed., Disp: , Rfl:     UBRELVY 100 mg tablet, Take 1 tablet (100 mg total) by mouth daily as needed for Migraine., Disp: 20 tablet, Rfl: 5    zolpidem (AMBIEN) 10 mg Tab, Take 1 tablet (10 mg total) by mouth nightly as needed (Insomnia)., Disp: 30 tablet, Rfl: 5    ZYRTEC 10 mg tablet, Take 10 mg by mouth., Disp: , Rfl:     Current Facility-Administered Medications:     [START ON 2024] onabotulinumtoxina injection 200 Units, 200 Units, Intramuscular, 1 time in Clinic/HOD, Mary Fried MD      Physical Exam:  BP (!) 142/98   Temp 98.2 °F (36.8 °C)   Ht 5' 2" (1.575 m)   Wt 121.7 kg (268 lb 3.2 oz)   LMP 2024 (Exact Date)   BMI 49.05 kg/m²     Chaperone present.       Constitutional: General appearance: healthy, well-nourished and well-developed  Psychiatric:  Orientation to time, place and person. Normal mood and affect and active, alert   Abdomen: Auscultation/Inspection/Palpation: No tenderness or masses. Soft, nondistended      Female Genitalia:      Vulva: no masses, atrophy or lesions      Bladder/Urethra: no urethral discharge or mass, normal meatus, bladder non-distended.      Vagina: no tenderness, erythema, cystocele, rectocele, abnormal vaginal discharge, or vesicle(s) or ulcers     Cervix: no discharge or cervical motion tenderness and grossly normal     Uterus: normal size and shape and midline, non-tender, and no uterine prolapse.     Adnexa/Parametria: no parametrial tenderness or mass, no adnexal tenderness or ovarian mass.         Assessment/Plan:  1. Pelvic pain, PCOS (polycystic ovarian syndrome)  Educated    NSAIDs, heating pad, warm bath    Pain/ER precautions   Pain diary  Cultures: " gc/cz/tv/myco/urea  RTC pelvis    Discussed with patient contraceptive options including natural family planning, barrier, oral contraceptives, patch, NuvaRing, Depo-Provera, Nexplanon, IUDs, abstinence.       Safe sex education     Discussed with patient that birth control options discussed above do not protect against STDs.    Patient strongly  desires IUD  Verification benefit  Will call once insurance approved and schedule insertion    2. Oligomenorrhea  UPT negative  Educated on bleeding patterns with PCOS  DW pt Mngt desire IUD for cycle control and pelvic pain     4. Elevated prolactin level  Educated  Repeat prolactin    7/17/23: 70.70  6/28/23: 47.91    5. Elevated blood pressure reading  Educated  BP today 140/92, 142/98  BP 1/24/24 142/98  Currently taking Lisinopril  F/u with PCP, Florencia Em    6. Fatigue  Educated  Keep fu with PCP    1/24/24  TSH: 0.626  FT4: 096  Vitamin D: 20.0        Follow up 2 weeks      This note was transcribed by Dianelys Zapata MA. There may be transcription errors as a result, however minimal. Effort has been made to ensure accuracy of transcription, but any obvious errors or omissions should be clarified with the author of the document.

## 2024-04-22 LAB
C TRACH RRNA SPEC QL NAA+PROBE: NEGATIVE
N GONORRHOEA RRNA SPEC QL NAA+PROBE: NEGATIVE
SPECIMEN SOURCE: NORMAL
T VAGINALIS RRNA SPEC QL NAA+PROBE: NEGATIVE

## 2024-04-23 ENCOUNTER — TELEPHONE (OUTPATIENT)
Dept: OBSTETRICS AND GYNECOLOGY | Facility: CLINIC | Age: 29
End: 2024-04-23
Payer: MEDICAID

## 2024-04-23 DIAGNOSIS — E28.2 PCOS (POLYCYSTIC OVARIAN SYNDROME): Primary | ICD-10-CM

## 2024-04-23 DIAGNOSIS — R10.2 PELVIC PAIN: ICD-10-CM

## 2024-04-23 NOTE — TELEPHONE ENCOUNTER
----- Message from Brylee Guillory sent at 4/23/2024  3:05 PM CDT -----  Regarding: Results  Contact: Princess  Type:  Test Results    Who Called: Princess  Name of Test (Lab/Mammo/Etc): MDL  Date of Test:   Ordering Provider:   Where the test was performed:   Would the patient rather a call back or a response via [facility]? Call back  Best Call Back Number: [phone]   Additional Information:  Pt called requesting her test results. Scheduled for a Mirena insertion so she wants to talk to a nurse to make sure everything is okay

## 2024-04-23 NOTE — TELEPHONE ENCOUNTER
Contacted patient. Notified her of normal vaginal cultures. I asked about the US. She states she did have a missed call, she will call the hospital tomorrow and schedule. Let her know she can do the labs the same day at the ultrasound. Pt voiced understanding.

## 2024-04-24 ENCOUNTER — OFFICE VISIT (OUTPATIENT)
Dept: FAMILY MEDICINE | Facility: CLINIC | Age: 29
End: 2024-04-24
Payer: MEDICAID

## 2024-04-24 VITALS
DIASTOLIC BLOOD PRESSURE: 84 MMHG | WEIGHT: 270 LBS | BODY MASS INDEX: 49.69 KG/M2 | SYSTOLIC BLOOD PRESSURE: 130 MMHG | HEIGHT: 62 IN | HEART RATE: 98 BPM | OXYGEN SATURATION: 99 %

## 2024-04-24 DIAGNOSIS — K21.9 GASTROESOPHAGEAL REFLUX DISEASE, UNSPECIFIED WHETHER ESOPHAGITIS PRESENT: ICD-10-CM

## 2024-04-24 DIAGNOSIS — Z00.01 ANNUAL VISIT FOR GENERAL ADULT MEDICAL EXAMINATION WITH ABNORMAL FINDINGS: Primary | ICD-10-CM

## 2024-04-24 DIAGNOSIS — F33.1 MODERATE EPISODE OF RECURRENT MAJOR DEPRESSIVE DISORDER: Primary | ICD-10-CM

## 2024-04-24 PROCEDURE — 1160F RVW MEDS BY RX/DR IN RCRD: CPT | Mod: CPTII,,, | Performed by: NURSE PRACTITIONER

## 2024-04-24 PROCEDURE — 3008F BODY MASS INDEX DOCD: CPT | Mod: CPTII,,, | Performed by: NURSE PRACTITIONER

## 2024-04-24 PROCEDURE — 3075F SYST BP GE 130 - 139MM HG: CPT | Mod: CPTII,,, | Performed by: NURSE PRACTITIONER

## 2024-04-24 PROCEDURE — 4010F ACE/ARB THERAPY RXD/TAKEN: CPT | Mod: CPTII,,, | Performed by: NURSE PRACTITIONER

## 2024-04-24 PROCEDURE — 99214 OFFICE O/P EST MOD 30 MIN: CPT | Mod: ,,, | Performed by: NURSE PRACTITIONER

## 2024-04-24 PROCEDURE — 3079F DIAST BP 80-89 MM HG: CPT | Mod: CPTII,,, | Performed by: NURSE PRACTITIONER

## 2024-04-24 PROCEDURE — 1159F MED LIST DOCD IN RCRD: CPT | Mod: CPTII,,, | Performed by: NURSE PRACTITIONER

## 2024-04-24 RX ORDER — CARIPRAZINE 3 MG/1
3 CAPSULE, GELATIN COATED ORAL DAILY
Qty: 30 CAPSULE | Refills: 11 | COMMUNITY
Start: 2024-04-24 | End: 2024-05-15 | Stop reason: SDUPTHER

## 2024-04-24 NOTE — PROGRESS NOTES
"Patient ID: Princess Colindres  : 1995    Chief Complaint: Depression (3 week follow up)    Allergies: Patient is allergic to codeine.     History of Present Illness:  The patient is a 28 y.o.    Black or   White female who presents to clinic for follow up on Depression (3 week follow up)     At last visit she had Complains of increased depression; compliant with Celexa and taking it for a long time. She had been staying in bed, not going to work. She denied any issues at home , no stressful life events, no changes that she felt were fueling this depression. We added Vraylar and she was advised to titrate up to 3 mg dose as we tried 1.5 mg dose last year and she did not find it helpful so stopped taking it.  it was only at the low dose and she stopped taking it before we titrated up. She reports that she had an adverse effect to multiple antidepressants in the past (including Zoloft, Effexor, Wellbutrin). She reports that the 3 mg dose is working really good, her mood is improved a lot. Denies SI/HI.    She is having a lot of problems with her acid reflux lately; she is taking OTC reflux medication and having to eat "tons of TUMs" in addition to that. She mary notes that she has trouble swallowing at times.      Social History:  reports that she has never smoked. She has never used smokeless tobacco. She reports that she does not currently use alcohol. She reports that she does not use drugs.    Past Medical History:  has a past medical history of Acute angle-closure glaucoma, Asthma, Cervical radiculopathy, Family history of diabetes mellitus, Fibromyalgia, BRITNEY (generalized anxiety disorder), Hypertension, Insomnia, Migraine headache, Ovarian cyst, and PCOS (polycystic ovarian syndrome).    Current Medications:  Current Outpatient Medications   Medication Instructions    albuterol (PROVENTIL/VENTOLIN HFA) 90 mcg/actuation inhaler 2 puffs, Inhalation    cholecalciferol (vitamin D3) 50,000 Units, " "Oral, Every 7 days    citalopram (CELEXA) 40 mg, Oral, Daily    cloNIDine (CATAPRES) 0.3 mg, Oral, Nightly    cyclobenzaprine (FLEXERIL) 10 mg, Oral, 2 times daily PRN    FLONASE ALLERGY RELIEF 50 mcg/actuation nasal spray 1 spray, Each Nostril, 2 times daily    gabapentin (NEURONTIN) 300 mg, Oral, 3 times daily    hydroCHLOROthiazide (HYDRODIURIL) 12.5 mg, Oral, Daily    ibuprofen 20 mg/mL oral liquid SMARTSI Milliliter(s) By Mouth Every 6 Hours PRN    lisinopriL (PRINIVIL,ZESTRIL) 30 mg, Oral, Daily    naproxen sodium (ANAPROX) 220 mg, Oral, Daily PRN    UBRELVY 100 mg, Oral, Daily PRN    VRAYLAR 3 mg, Oral, Daily, Take 1.5 mg dose x 2 weeks, then increase to 3 mg dose    zolpidem (AMBIEN) 10 mg, Oral, Nightly PRN    ZyrTEC 10 mg, Oral       Review of Systems   See HPI    Visit Vitals  /84   Pulse 98   Ht 5' 2" (1.575 m)   Wt 122.5 kg (270 lb)   LMP 2024 (Exact Date)   SpO2 99%   BMI 49.38 kg/m²       Physical Exam  Vitals reviewed.   Constitutional:       Appearance: Normal appearance. She is obese.   HENT:      Mouth/Throat:      Mouth: Mucous membranes are moist.      Pharynx: Oropharynx is clear.   Eyes:      Conjunctiva/sclera: Conjunctivae normal.   Cardiovascular:      Heart sounds: Normal heart sounds.   Pulmonary:      Breath sounds: Normal breath sounds.   Abdominal:      General: Bowel sounds are normal. There is no distension.      Palpations: Abdomen is soft.      Tenderness: There is no abdominal tenderness.   Skin:     General: Skin is warm and dry.          Assessment & Plan:  1. Moderate episode of recurrent major depressive disorder  Overview:  On Celexa 40 mg daily.   Vraylar 1.5 mg added (2023)  Vraylar increased to 3 mg (2024)    Assessment & Plan:  Continue Celexa along with Vraylar 3 mg daily.     Orders:  -     cariprazine (VRAYLAR) 3 mg Cap; Take 1 capsule (3 mg total) by mouth Daily. Take 1.5 mg dose x 2 weeks, then increase to 3 mg dose  Dispense: 30 capsule; " Refill: 11    2. Gastroesophageal reflux disease, unspecified whether esophagitis present  Overview:  Takes Prilosec OTC    Assessment & Plan:  Take medication: Increase Prilosec to 40 mg daily and use Zantac prn. Try to avoid excessive use of TUMs.     Preventive Measures:   Avoid spicy, acidic, fried foods and alcohol.  Eat 2-3 hours before going to bed. Remain upright for 30-60 minutes after eating.   Avoid tight clothing, chew food thoroughly.  Reduce caffeine intake, avoid soda.               Future Appointments   Date Time Provider Department Center   4/25/2024  9:10 AM LAB, [facility] LABORATORY DRAW STATION [facility] LABDS [facility]   5/1/2024  8:00 AM Florencia Em FNP-C [facility]   5/1/2024  9:30 AM Emily Leon MD [facility]   5/6/2024  9:30 AM Mary Fried MD [facility] [place]UR[place]   5/8/2024  9:10 AM Emily Leon MD [facility] GLORIA Khan OB   5/22/2024  2:00 PM BOTOX, [facility] NEUROLOGY [facility] NEURO [place]   6/26/2024  9:20 AM Emily Leon [place] [facility] [place]       Follow up if symptoms worsen or fail to improve, for Keep appointment as scheduled. Call sooner if needed.    GAYATRI Burrows    Lab Frequency Next Occurrence   Glucose Tolerance, 2 Hours Once 06/14/2023   Ambulatory referral/consult to Allergy Once 11/06/2023   Ambulatory referral/consult to Sleep Disorders Once 12/05/2023   Prolactin Once 01/03/2024   Ambulatory referral/consult to General Surgery Once 01/11/2024   Ambulatory referral/consult to Sleep Disorders Once 02/06/2024   Vitamin D Once 04/30/2024   Prolactin Once 04/18/2024   US Pelvis Comp with Transvag NON-OB (xpd Once 04/29/2024

## 2024-04-24 NOTE — ASSESSMENT & PLAN NOTE
Take medication: Increase Prilosec to 40 mg daily and use Zantac prn. Try to avoid excessive use of TUMs.     Preventive Measures:   Avoid spicy, acidic, fried foods and alcohol.  Eat 2-3 hours before going to bed. Remain upright for 30-60 minutes after eating.   Avoid tight clothing, chew food thoroughly.  Reduce caffeine intake, avoid soda.

## 2024-04-30 ENCOUNTER — PATIENT MESSAGE (OUTPATIENT)
Dept: FAMILY MEDICINE | Facility: CLINIC | Age: 29
End: 2024-04-30
Payer: MEDICAID

## 2024-04-30 DIAGNOSIS — K21.9 GASTROESOPHAGEAL REFLUX DISEASE, UNSPECIFIED WHETHER ESOPHAGITIS PRESENT: Primary | ICD-10-CM

## 2024-05-06 ENCOUNTER — TELEPHONE (OUTPATIENT)
Dept: FAMILY MEDICINE | Facility: CLINIC | Age: 29
End: 2024-05-06
Payer: MEDICAID

## 2024-05-06 ENCOUNTER — HOSPITAL ENCOUNTER (OUTPATIENT)
Dept: RADIOLOGY | Facility: HOSPITAL | Age: 29
Discharge: HOME | End: 2024-05-06
Attending: OBSTETRICS & GYNECOLOGY
Payer: MEDICAID

## 2024-05-06 DIAGNOSIS — R10.2 PELVIC PAIN: ICD-10-CM

## 2024-05-06 PROCEDURE — 76830 TRANSVAGINAL US NON-OB: CPT | Mod: TC

## 2024-05-06 NOTE — PROGRESS NOTES
Chief Complaint:  F/U U/S  & lab results    History of Present Illness:  Patient is a 28 y.o.  with PMH of PCOS presents to follow up pelvic ultrasound and cultures secondary to a four week history of pelvic pain as well as follow up recent prolactin. Pelvic pain previously controlled with Mirena, pain has resolved and cycles are normalizing. No longer desires Mirena.      LMP: 24  Frequency: q month (no cycle in Feb)   Cycle length: 2 days  Flow: light  Intermenstrual bleeding: no  Postcoital bleeding: no  Dysmenorrhea: n/a  Sexually active: not currently  Dyspareunia: n/a  Contraception: none  H/o STI: no  Last pap: 23 NIL  H/o abnl pap: no  Colposcopy: no  Gardasil: 0/3  MMG: n/a  H/o abnl MMG: n/a  Colonoscopy: n/a      Review of systems:  General/Constitutional: Chills denies. Fatigue/weakness denies. Fever denies. Night sweats denies. Hot flashes denies  Breast: Dimpling denies. Breast mass denies. Breast pain/tenderness denies. Nipple discharge denies. Puckering denies.  Gastrointestinal: Abdominal pain denies. Blood in stool denies. Constipation denies. Diarrhea denies. Heartburn denies. Nausea denies. Vomiting denies   Genitourinary: Incontinence denies. Blood in urine denies. Frequent urination denies. Urgency denies. Painful urination denies. Nocturia denies   Gynecologic: Irregular menses denies. Heavy bleeding denies. Painful menses denies. Vaginal discharge denies. Vaginal odor denies. Vaginal itching/Irritation denies. Vaginal lesion denies.  Pelvic pain denies. Decreased libido denies. Vulvar lesion denies. Prolapse of genital organs denies. Painful intercourse denies. Postcoital bleeding denies   Psychiatric: Mood lability denies. Depressed mood denies. Suicidal thoughts denies. Anxiety denies. Overwhelmed denies. Appetite normal. Energy level normal.     OB History    Para Term  AB Living   1 1 1 0 0 1   SAB IAB Ectopic Multiple Live Births   0 0 0 0 1      # 1 -  Date: 18, Sex: Male, Weight: 3.147 kg (6 lb 15 oz), GA: 38w0d, Type: , Low Transverse, Apgar1: None, Apgar5: None, Living: Living, Birth Comments: None      Past Medical History:   Diagnosis Date    Acute angle-closure glaucoma     bilateral    Asthma     Cervical radiculopathy     Family history of diabetes mellitus     Fibromyalgia     BRITNEY (generalized anxiety disorder)     Hypertension     Insomnia     Migraine headache     Ovarian cyst     PCOS (polycystic ovarian syndrome)        Current Outpatient Medications:     albuterol (PROVENTIL/VENTOLIN HFA) 90 mcg/actuation inhaler, Inhale 2 puffs into the lungs., Disp: , Rfl:     cariprazine (VRAYLAR) 3 mg Cap, Take 1 capsule (3 mg total) by mouth Daily. Take 1.5 mg dose x 2 weeks, then increase to 3 mg dose, Disp: 30 capsule, Rfl: 11    cholecalciferol, vitamin D3, 1,250 mcg (50,000 unit) capsule, Take 1 capsule (50,000 Units total) by mouth every 7 days., Disp: 12 capsule, Rfl: 1    citalopram (CELEXA) 40 MG tablet, Take 1 tablet (40 mg total) by mouth once daily., Disp: 30 tablet, Rfl: 11    cloNIDine (CATAPRES) 0.3 MG tablet, Take 1 tablet (0.3 mg total) by mouth every evening., Disp: 30 tablet, Rfl: 11    cyclobenzaprine (FLEXERIL) 10 MG tablet, Take 1 tablet (10 mg total) by mouth 2 (two) times daily as needed for Muscle spasms., Disp: 60 tablet, Rfl: 5    FLONASE ALLERGY RELIEF 50 mcg/actuation nasal spray, 1 spray by Each Nostril route 2 (two) times daily., Disp: , Rfl:     gabapentin (NEURONTIN) 300 MG capsule, Take 1 capsule (300 mg total) by mouth 3 (three) times daily., Disp: 90 capsule, Rfl: 0    hydroCHLOROthiazide (HYDRODIURIL) 12.5 MG Tab, Take 12.5 mg by mouth once daily., Disp: , Rfl:     ibuprofen 20 mg/mL oral liquid, SMARTSI Milliliter(s) By Mouth Every 6 Hours PRN, Disp: , Rfl:     lisinopriL (PRINIVIL,ZESTRIL) 30 MG tablet, Take 1 tablet (30 mg total) by mouth once daily., Disp: 30 tablet, Rfl: 11    naproxen sodium (ANAPROX)  "220 MG tablet, Take 220 mg by mouth daily as needed., Disp: , Rfl:     UBRELVY 100 mg tablet, Take 1 tablet (100 mg total) by mouth daily as needed for Migraine., Disp: 20 tablet, Rfl: 5    zolpidem (AMBIEN) 10 mg Tab, Take 1 tablet (10 mg total) by mouth nightly as needed (Insomnia)., Disp: 30 tablet, Rfl: 5    ZYRTEC 10 mg tablet, Take 10 mg by mouth., Disp: , Rfl:     Current Facility-Administered Medications:     [START ON 5/22/2024] onabotulinumtoxina injection 200 Units, 200 Units, Intramuscular, 1 time in Clinic/HOD, Mary Fired MD      Physical Exam:  /82   Temp 97.2 °F (36.2 °C)   Ht 5' 2" (1.575 m)   Wt 122.1 kg (269 lb 3.2 oz)   LMP 04/24/2024 (Exact Date)   BMI 49.24 kg/m²     Constitutional: General appearance: healthy, well-nourished and well-developed   Psychiatric:  Orientation to time, place and person. Normal mood and affect and active, alert       Assessment/Plan:  1. Pelvic pain  2. PCOS (polycystic ovarian syndrome)  3. Oligomenorrhea  4. H/o elevated Prl  Pain resolved  & cycles normalizing   Educated  Reviewed imaging and cultures with patient, see below  Pain/ER precautions    Offered conservative vs medical management  Cycles are normalizing, pt desires to monitor cycles  Pt no longer desires Mirena.  Insurance has approved. If pt desires Mirena, can schedule insertion. Does not need discussion appt.  Advise pt to call if cycles become irregular      US pelvis 5/6/24   - Uterus: 7.4 x 4 by 5 cm   - ES: 0.47 cm   - ROV: 3 cm and both demonstrate follicles   - LOV: 3 cm and both demonstrate follicles       Cultures 4/18/24: negative gc/cz/tv/myco/urea      4. Elevated prolactin level  Educated  Discussed with patient decreasing    Prolactin  5/7/24: 26.39  7/17/23: 70.70  6/28/23: 47.91      This note was transcribed by Dianelys Zapata MA. There may be transcription errors as a result, however minimal. Effort has been made to ensure accuracy of transcription, but any obvious errors " or omissions should be clarified with the author of the document.

## 2024-05-07 ENCOUNTER — LAB VISIT (OUTPATIENT)
Dept: LAB | Facility: HOSPITAL | Age: 29
End: 2024-05-07
Attending: OBSTETRICS & GYNECOLOGY
Payer: MEDICAID

## 2024-05-07 ENCOUNTER — TELEPHONE (OUTPATIENT)
Dept: OBSTETRICS AND GYNECOLOGY | Facility: CLINIC | Age: 29
End: 2024-05-07
Payer: MEDICAID

## 2024-05-07 DIAGNOSIS — Z00.01 ANNUAL VISIT FOR GENERAL ADULT MEDICAL EXAMINATION WITH ABNORMAL FINDINGS: ICD-10-CM

## 2024-05-07 DIAGNOSIS — R79.89 ELEVATED PROLACTIN LEVEL: ICD-10-CM

## 2024-05-07 LAB
ALBUMIN SERPL-MCNC: 4.3 G/DL (ref 3.4–5)
ALBUMIN/GLOB SERPL: 1.5 RATIO
ALP SERPL-CCNC: 83 UNIT/L (ref 50–144)
ALT SERPL-CCNC: 21 UNIT/L (ref 1–45)
ANION GAP SERPL CALC-SCNC: 5 MEQ/L (ref 2–13)
AST SERPL-CCNC: 27 UNIT/L (ref 14–36)
BASOPHILS # BLD AUTO: 0.11 X10(3)/MCL (ref 0.01–0.08)
BASOPHILS NFR BLD AUTO: 1.4 % (ref 0.1–1.2)
BILIRUB SERPL-MCNC: 0.3 MG/DL (ref 0–1)
BUN SERPL-MCNC: 10 MG/DL (ref 7–20)
CALCIUM SERPL-MCNC: 9.5 MG/DL (ref 8.4–10.2)
CHLORIDE SERPL-SCNC: 107 MMOL/L (ref 98–110)
CHOLEST SERPL-MCNC: 205 MG/DL (ref 0–200)
CO2 SERPL-SCNC: 29 MMOL/L (ref 21–32)
CREAT SERPL-MCNC: 0.74 MG/DL (ref 0.66–1.25)
CREAT/UREA NIT SERPL: 14 (ref 12–20)
DEPRECATED CALCIDIOL+CALCIFEROL SERPL-MC: 39.9 NG/ML (ref 30–80)
EOSINOPHIL # BLD AUTO: 0.14 X10(3)/MCL (ref 0.04–0.36)
EOSINOPHIL NFR BLD AUTO: 1.8 % (ref 0.7–7)
ERYTHROCYTE [DISTWIDTH] IN BLOOD BY AUTOMATED COUNT: 14.5 % (ref 11–14.5)
EST. AVERAGE GLUCOSE BLD GHB EST-MCNC: 99.7 MG/DL (ref 70–115)
GFR SERPLBLD CREATININE-BSD FMLA CKD-EPI: >90 MLS/MIN/1.73/M2
GLOBULIN SER-MCNC: 2.9 GM/DL (ref 2–3.9)
GLUCOSE SERPL-MCNC: 80 MG/DL (ref 70–115)
HBA1C MFR BLD: 5.1 % (ref 4–6)
HCT VFR BLD AUTO: 39.4 % (ref 36–48)
HDLC SERPL-MCNC: 54 MG/DL (ref 40–60)
HGB BLD-MCNC: 13.7 G/DL (ref 11.8–16)
IMM GRANULOCYTES # BLD AUTO: 0.02 X10(3)/MCL (ref 0–0.03)
IMM GRANULOCYTES NFR BLD AUTO: 0.3 % (ref 0–0.5)
LDLC SERPL DIRECT ASSAY-SCNC: 105.6 MG/DL (ref 30–100)
LYMPHOCYTES # BLD AUTO: 2.37 X10(3)/MCL (ref 1.16–3.74)
LYMPHOCYTES NFR BLD AUTO: 30.7 % (ref 20–55)
MCH RBC QN AUTO: 25.4 PG (ref 27–34)
MCHC RBC AUTO-ENTMCNC: 34.8 G/DL (ref 31–37)
MCV RBC AUTO: 73.1 FL (ref 79–99)
MONOCYTES # BLD AUTO: 0.54 X10(3)/MCL (ref 0.24–0.36)
MONOCYTES NFR BLD AUTO: 7 % (ref 4.7–12.5)
NEUTROPHILS # BLD AUTO: 4.53 X10(3)/MCL (ref 1.56–6.13)
NEUTROPHILS NFR BLD AUTO: 58.8 % (ref 37–73)
NRBC BLD AUTO-RTO: 0 %
PLATELET # BLD AUTO: 302 X10(3)/MCL (ref 140–371)
PMV BLD AUTO: 12.7 FL (ref 9.4–12.4)
POTASSIUM SERPL-SCNC: 4.3 MMOL/L (ref 3.5–5.1)
PROLACTIN LEVEL (OLG): 26.39 NG/ML (ref 5.18–26.53)
PROT SERPL-MCNC: 7.2 GM/DL (ref 6.3–8.2)
RBC # BLD AUTO: 5.39 X10(6)/MCL (ref 4–5.1)
SODIUM SERPL-SCNC: 141 MMOL/L (ref 135–145)
T4 FREE SERPL-MCNC: 1.2 NG/DL (ref 0.78–2.19)
TRIGL SERPL-MCNC: 143 MG/DL (ref 30–200)
TSH SERPL-ACNC: 0.46 UIU/ML (ref 0.36–3.74)
WBC # SPEC AUTO: 7.71 X10(3)/MCL (ref 4–11.5)

## 2024-05-07 PROCEDURE — 84439 ASSAY OF FREE THYROXINE: CPT

## 2024-05-07 PROCEDURE — 36415 COLL VENOUS BLD VENIPUNCTURE: CPT

## 2024-05-07 PROCEDURE — 82306 VITAMIN D 25 HYDROXY: CPT

## 2024-05-07 PROCEDURE — 80053 COMPREHEN METABOLIC PANEL: CPT

## 2024-05-07 PROCEDURE — 85025 COMPLETE CBC W/AUTO DIFF WBC: CPT

## 2024-05-07 PROCEDURE — 84146 ASSAY OF PROLACTIN: CPT

## 2024-05-07 PROCEDURE — 83036 HEMOGLOBIN GLYCOSYLATED A1C: CPT

## 2024-05-07 PROCEDURE — 80061 LIPID PANEL: CPT

## 2024-05-07 PROCEDURE — 84443 ASSAY THYROID STIM HORMONE: CPT

## 2024-05-08 ENCOUNTER — OFFICE VISIT (OUTPATIENT)
Dept: OBSTETRICS AND GYNECOLOGY | Facility: CLINIC | Age: 29
End: 2024-05-08
Payer: MEDICAID

## 2024-05-08 VITALS
DIASTOLIC BLOOD PRESSURE: 82 MMHG | BODY MASS INDEX: 49.54 KG/M2 | TEMPERATURE: 97 F | SYSTOLIC BLOOD PRESSURE: 132 MMHG | HEIGHT: 62 IN | WEIGHT: 269.19 LBS

## 2024-05-08 DIAGNOSIS — R79.89 ELEVATED PROLACTIN LEVEL: ICD-10-CM

## 2024-05-08 DIAGNOSIS — N91.5 OLIGOMENORRHEA, UNSPECIFIED TYPE: ICD-10-CM

## 2024-05-08 DIAGNOSIS — G89.29 CHRONIC BILATERAL LOW BACK PAIN WITHOUT SCIATICA: ICD-10-CM

## 2024-05-08 DIAGNOSIS — E28.2 PCOS (POLYCYSTIC OVARIAN SYNDROME): ICD-10-CM

## 2024-05-08 DIAGNOSIS — R10.2 PELVIC PAIN: Primary | ICD-10-CM

## 2024-05-08 DIAGNOSIS — M54.50 CHRONIC BILATERAL LOW BACK PAIN WITHOUT SCIATICA: ICD-10-CM

## 2024-05-08 PROCEDURE — 4010F ACE/ARB THERAPY RXD/TAKEN: CPT | Mod: CPTII,,, | Performed by: OBSTETRICS & GYNECOLOGY

## 2024-05-08 PROCEDURE — 3008F BODY MASS INDEX DOCD: CPT | Mod: CPTII,,, | Performed by: OBSTETRICS & GYNECOLOGY

## 2024-05-08 PROCEDURE — 3044F HG A1C LEVEL LT 7.0%: CPT | Mod: CPTII,,, | Performed by: OBSTETRICS & GYNECOLOGY

## 2024-05-08 PROCEDURE — 3075F SYST BP GE 130 - 139MM HG: CPT | Mod: CPTII,,, | Performed by: OBSTETRICS & GYNECOLOGY

## 2024-05-08 PROCEDURE — 1159F MED LIST DOCD IN RCRD: CPT | Mod: CPTII,,, | Performed by: OBSTETRICS & GYNECOLOGY

## 2024-05-08 PROCEDURE — 99214 OFFICE O/P EST MOD 30 MIN: CPT | Mod: ,,, | Performed by: OBSTETRICS & GYNECOLOGY

## 2024-05-08 PROCEDURE — 3079F DIAST BP 80-89 MM HG: CPT | Mod: CPTII,,, | Performed by: OBSTETRICS & GYNECOLOGY

## 2024-05-08 RX ORDER — GABAPENTIN 300 MG/1
300 CAPSULE ORAL 3 TIMES DAILY
Qty: 90 CAPSULE | Refills: 0 | Status: SHIPPED | OUTPATIENT
Start: 2024-05-08

## 2024-05-09 ENCOUNTER — PATIENT MESSAGE (OUTPATIENT)
Dept: FAMILY MEDICINE | Facility: CLINIC | Age: 29
End: 2024-05-09
Payer: MEDICAID

## 2024-05-15 DIAGNOSIS — G43.909 MIGRAINE WITHOUT STATUS MIGRAINOSUS, NOT INTRACTABLE, UNSPECIFIED MIGRAINE TYPE: ICD-10-CM

## 2024-05-15 DIAGNOSIS — F33.1 MODERATE EPISODE OF RECURRENT MAJOR DEPRESSIVE DISORDER: ICD-10-CM

## 2024-05-15 PROBLEM — R79.89 LOW TSH LEVEL: Status: RESOLVED | Noted: 2023-12-18 | Resolved: 2024-05-15

## 2024-05-15 RX ORDER — CARIPRAZINE 3 MG/1
3 CAPSULE, GELATIN COATED ORAL DAILY
Qty: 30 CAPSULE | Refills: 11 | COMMUNITY
Start: 2024-05-15 | End: 2024-05-27 | Stop reason: SDUPTHER

## 2024-05-15 RX ORDER — UBROGEPANT 100 MG/1
100 TABLET ORAL DAILY PRN
Qty: 16 TABLET | Refills: 5 | Status: SHIPPED | OUTPATIENT
Start: 2024-05-15

## 2024-05-15 RX ORDER — UBROGEPANT 100 MG/1
100 TABLET ORAL DAILY PRN
Qty: 20 TABLET | Refills: 5 | Status: SHIPPED | OUTPATIENT
Start: 2024-05-15 | End: 2024-05-15 | Stop reason: SDUPTHER

## 2024-05-22 ENCOUNTER — PROCEDURE VISIT (OUTPATIENT)
Dept: NEUROLOGY | Facility: CLINIC | Age: 29
End: 2024-05-22
Payer: MEDICAID

## 2024-05-22 VITALS
OXYGEN SATURATION: 100 % | TEMPERATURE: 98 F | RESPIRATION RATE: 16 BRPM | SYSTOLIC BLOOD PRESSURE: 130 MMHG | HEART RATE: 82 BPM | WEIGHT: 267 LBS | HEIGHT: 62 IN | DIASTOLIC BLOOD PRESSURE: 88 MMHG | BODY MASS INDEX: 49.13 KG/M2

## 2024-05-22 DIAGNOSIS — G43.711 INTRACTABLE CHRONIC MIGRAINE WITHOUT AURA AND WITH STATUS MIGRAINOSUS: ICD-10-CM

## 2024-05-22 DIAGNOSIS — G43.711 CHRONIC MIGRAINE WITHOUT AURA, INTRACTABLE, WITH STATUS MIGRAINOSUS: Primary | ICD-10-CM

## 2024-05-22 PROCEDURE — 64615 CHEMODENERV MUSC MIGRAINE: CPT | Mod: PBBFAC | Performed by: PSYCHIATRY & NEUROLOGY

## 2024-05-22 PROCEDURE — 64615 CHEMODENERV MUSC MIGRAINE: CPT | Mod: S$PBB,,, | Performed by: PSYCHIATRY & NEUROLOGY

## 2024-05-22 RX ORDER — AMOXICILLIN 500 MG/1
CAPSULE ORAL
COMMUNITY
Start: 2024-05-17

## 2024-05-22 RX ADMIN — ONABOTULINUMTOXINA 200 UNITS: 200 INJECTION, POWDER, LYOPHILIZED, FOR SOLUTION INTRADERMAL; INTRAMUSCULAR at 02:05

## 2024-05-22 NOTE — PROCEDURES
[facility] Outpatient Botox Procedure Note    History of Present Illness     This is 28 y.o. female with history of morbid obesity, Chiari malformation type 1, anxiety, PCOS, insomnia, hypertension who is referred for chronic migraine without aura with bruxism.  Patient is here for Botox 4.     Age of Onset : 10 years old     Headache Description:   occipital, paracervical, stabbing, throbbing, pressure sensation, moderate to severe, lasting >24 hours to 7 days, with nausea, with photophobia and phonophobia.   Left cheek, lower jaw, gum numbness and occasional periorbital throbbing pain.  Also reports jaw clicking sensation at times.     Baseline Headache Frequency: 15 migraine headache days per month for the past 3 years.   Interval Headache Frequency:  2 migraine headache days per month     Current Prophylactic  Erenumab 70 mg once per month (12/12/2022 to present)  Lisinopril 30 mg daily  Celexa 40 mg daily  Gabapentin 300 mg 3 times a day (12/20/2022 to present)     Current Abortive  Ubrogepant 100 mg twice a day as needed (12/12/2022 to present): effective.   Cyclobenzaprine     Review of System      reviewed. stable.    Focused Exam     Reviewed.  Stable.    Assessment     This is 28 y.o. female with history of morbid obesity, Chiari malformation type 1, anxiety, PCOS, insomnia, hypertension who is referred for chronic migraine without aura with bruxism.  Patient is here for Botox 4.     Procedure     Date of procedure: 5/22/2024    Procedure: Chronic Migraine Chemodenervation with Botulinum toxin    The patient was identified and informed consent was reviewed with the patient, and we discussed the risks, benefits and alternatives. Specifically, we discussed the risks of bleeding, infection and nerve injury with worsened pain and function. Specifically, we discussed the most frequently reported adverse reactions following injection of BOTOX include headache (5%), eyelid ptosis (4%), muscular weakness (4%),  bronchitis (3%), injection-site pain (3%), musculoskeletal pain (3%), myalgia (3%), facial paresis (2%), hypertension (2%), and muscle spasms (2%). The patient verbalized an understanding of these risks and the symptoms and the potentially catastrophic consequences of this occurrence. The patient verbalized an understanding that if she should begin to have these symptoms that she should immediately go to the nearest emergency room for evaluation. The patient was then positioned. The injection sites were identified and was prepped with Alcohol. 4cc of preservative free normal saline was mixed with 200 units of Botox. A 30-gauge, 0.5 inch needle was then used to inject a total 155 units of Botox. Muscles injected as below. Patient tolerated the procedure well with no complaints.    A. : Botox dosage: 10 units in 2 sites Right: 5 Left: 5   B. Procerus Botox dosage: 5 Units in 1 site   C. Frontalis Botox dosage: 20 Units divided in 4 sites Right: 10 Left: 10   D. Temporalis Botox dosage: 40 Units divided in 8 sites Right: 20 Left: 20   E. Occipitalis Botox dosage 30 Units divided in 6 sites Right: 15 Left: 15   F. Cervical Paraspinal Botox dosage: 20 Units divided in 4 sites: Right 10 Left: 10   G. Trapezius Botox dosage: 30 Units divided in 6 sites: Right: 15 Left: 15     Total Units Injected: 155 units   Total Units discarded: 45 units     Follow Up       RTC Botox      Mary Fried MD   [facility]

## 2024-05-27 DIAGNOSIS — F33.1 MODERATE EPISODE OF RECURRENT MAJOR DEPRESSIVE DISORDER: ICD-10-CM

## 2024-05-28 RX ORDER — CARIPRAZINE 3 MG/1
3 CAPSULE, GELATIN COATED ORAL DAILY
Qty: 30 CAPSULE | Refills: 11 | COMMUNITY
Start: 2024-05-28 | End: 2024-05-29 | Stop reason: SDUPTHER

## 2024-05-29 DIAGNOSIS — F33.1 MODERATE EPISODE OF RECURRENT MAJOR DEPRESSIVE DISORDER: ICD-10-CM

## 2024-05-29 RX ORDER — CARIPRAZINE 3 MG/1
3 CAPSULE, GELATIN COATED ORAL DAILY
Qty: 30 CAPSULE | Refills: 11 | Status: SHIPPED | OUTPATIENT
Start: 2024-05-29

## 2024-05-30 ENCOUNTER — TELEPHONE (OUTPATIENT)
Dept: FAMILY MEDICINE | Facility: CLINIC | Age: 29
End: 2024-05-30
Payer: MEDICAID

## 2024-05-30 NOTE — TELEPHONE ENCOUNTER
I called [facility] d/t getting a denial on Vraylar 3mg. I spoke with Nicky. Medication approved.  I called pt and left a voicemail explaining that she can call her pharmacy and let them know.

## 2024-06-12 ENCOUNTER — HOSPITAL ENCOUNTER (OUTPATIENT)
Dept: RADIOLOGY | Facility: HOSPITAL | Age: 29
Discharge: HOME | End: 2024-06-12
Attending: SURGERY
Payer: MEDICAID

## 2024-06-12 DIAGNOSIS — R11.2 NAUSEA WITH VOMITING: ICD-10-CM

## 2024-06-12 DIAGNOSIS — R07.89 OTHER CHEST PAIN: ICD-10-CM

## 2024-06-12 PROCEDURE — 74240 X-RAY XM UPR GI TRC 1CNTRST: CPT | Mod: TC

## 2024-06-12 PROCEDURE — A9698 NON-RAD CONTRAST MATERIALNOC: HCPCS | Performed by: SURGERY

## 2024-06-12 PROCEDURE — 25500020 PHARM REV CODE 255: Performed by: SURGERY

## 2024-06-12 PROCEDURE — 76700 US EXAM ABDOM COMPLETE: CPT | Mod: TC

## 2024-06-12 RX ADMIN — BARIUM SULFATE 135 ML: 980 POWDER, FOR SUSPENSION ORAL at 12:06

## 2024-06-13 ENCOUNTER — ANESTHESIA EVENT (OUTPATIENT)
Dept: GASTROENTEROLOGY | Facility: HOSPITAL | Age: 29
End: 2024-06-13
Payer: MEDICAID

## 2024-06-13 ENCOUNTER — HOSPITAL ENCOUNTER (OUTPATIENT)
Dept: PREADMISSION TESTING | Facility: HOSPITAL | Age: 29
Discharge: HOME | End: 2024-06-13
Attending: SURGERY
Payer: MEDICAID

## 2024-06-13 VITALS — HEIGHT: 62 IN | BODY MASS INDEX: 49.13 KG/M2 | WEIGHT: 267 LBS

## 2024-06-13 DIAGNOSIS — K44.9 HIATAL HERNIA: Primary | ICD-10-CM

## 2024-06-13 RX ORDER — SODIUM CHLORIDE, SODIUM LACTATE, POTASSIUM CHLORIDE, CALCIUM CHLORIDE 600; 310; 30; 20 MG/100ML; MG/100ML; MG/100ML; MG/100ML
INJECTION, SOLUTION INTRAVENOUS
Status: CANCELLED | OUTPATIENT
Start: 2024-06-14

## 2024-06-13 NOTE — ANESTHESIA PREPROCEDURE EVALUATION
2024  Princess Colindres is a 28 y.o., female.  Anesthesia History    No specialty history recorded    Other Medical History   Asthma Acute angle-closure glaucoma   Cervical radiculopathy Family history of diabetes mellitus   Fibromyalgia BRITNEY (generalized anxiety disorder)   Hypertension Insomnia   Migraine headache Ovarian cyst   PCOS (polycystic ovarian syndrome) Low TSH level     Surgical History     SECTION LAPAROSCOPIC CHOLECYSTECTOMY WITH CHOLANGIOGRAPHY   CHOLECYSTECTOMY      COVID-19 Risk of Complications  Current as of 3 hours ago    2 0 to < 3 Points: Low Risk   3 to < 6 Points: Medium Risk   6 to 16 Points: High Risk     Last Change: N/A      Details   Substance History    Smoking Status: Never   Smokeless Tobacco Status: Never   Alcohol use: Not Currently   Drug use: Never     Anesthesia Family History    Problem Relations (Age of Onset)   No history of this type found      Current Gender Identity    Questions Responses   Patient's Gender Identity: Female   Patient's sex assigned at birth: Female          Pre-op Assessment    I have reviewed the Patient Summary Reports.     I have reviewed the Nursing Notes. I have reviewed the NPO Status.   I have reviewed the Medications.     Review of Systems  Anesthesia Hx:  No problems with previous Anesthesia             Denies Family Hx of Anesthesia complications.    Denies Personal Hx of Anesthesia complications.                    Hematology/Oncology:  Hematology Normal   Oncology Normal                                   EENT/Dental:  EENT/Dental Normal  Acute angle glaucoma          Cardiovascular:  Exercise tolerance: poor   Hypertension                                        Pulmonary:    Asthma                    Renal/:  Renal/ Normal                 Hepatic/GI:    Hiatal Hernia, GERD             Musculoskeletal:         Spine  Disorders: cervical            OB/GYN/PEDS:  PCOS           Neurological:    Neuromuscular Disease,  Headaches                                 Endocrine:  Endocrine Normal          Morbid Obesity / BMI > 40  Dermatological:  Skin Normal    Psych:  Psychiatric History anxiety                 Physical Exam  General: Well nourished, Cooperative, Alert and Oriented    Airway:  Mallampati: II / II  Mouth Opening: Normal  TM Distance: Normal  Tongue: Normal  Neck ROM: Normal ROM    Dental:  Intact        Anesthesia Plan  Type of Anesthesia, risks & benefits discussed:    Anesthesia Type: MAC  Intra-op Monitoring Plan: Standard ASA Monitors  Post Op Pain Control Plan: multimodal analgesia  Induction:  IV  Informed Consent: Informed consent signed with the Patient and all parties understand the risks and agree with anesthesia plan.  All questions answered. Patient consented to blood products? Yes  ASA Score: 3  Day of Surgery Review of History & Physical: H&P Update referred to the surgeon/provider.I have interviewed and examined the patient. I have reviewed the patient's H&P dated: There are no significant changes.     Ready For Surgery From Anesthesia Perspective.     .

## 2024-06-13 NOTE — DISCHARGE INSTRUCTIONS

## 2024-06-14 ENCOUNTER — ANESTHESIA (OUTPATIENT)
Dept: GASTROENTEROLOGY | Facility: HOSPITAL | Age: 29
End: 2024-06-14
Payer: MEDICAID

## 2024-06-14 ENCOUNTER — HOSPITAL ENCOUNTER (OUTPATIENT)
Dept: GASTROENTEROLOGY | Facility: HOSPITAL | Age: 29
Discharge: HOME | End: 2024-06-14
Attending: SURGERY
Payer: MEDICAID

## 2024-06-14 VITALS
HEART RATE: 85 BPM | SYSTOLIC BLOOD PRESSURE: 162 MMHG | RESPIRATION RATE: 20 BRPM | WEIGHT: 267 LBS | HEIGHT: 62 IN | OXYGEN SATURATION: 100 % | TEMPERATURE: 98 F | DIASTOLIC BLOOD PRESSURE: 110 MMHG | BODY MASS INDEX: 49.13 KG/M2

## 2024-06-14 DIAGNOSIS — R11.2 NAUSEA AND VOMITING, UNSPECIFIED VOMITING TYPE: ICD-10-CM

## 2024-06-14 DIAGNOSIS — K44.9 HIATAL HERNIA: Primary | ICD-10-CM

## 2024-06-14 LAB — B-HCG UR QL: NEGATIVE

## 2024-06-14 PROCEDURE — 37000008 HC ANESTHESIA 1ST 15 MINUTES

## 2024-06-14 PROCEDURE — 43239 EGD BIOPSY SINGLE/MULTIPLE: CPT | Performed by: SURGERY

## 2024-06-14 PROCEDURE — 63600175 PHARM REV CODE 636 W HCPCS: Performed by: NURSE ANESTHETIST, CERTIFIED REGISTERED

## 2024-06-14 PROCEDURE — 63600175 PHARM REV CODE 636 W HCPCS: Performed by: SURGERY

## 2024-06-14 PROCEDURE — 27201423 OPTIME MED/SURG SUP & DEVICES STERILE SUPPLY

## 2024-06-14 PROCEDURE — 81025 URINE PREGNANCY TEST: CPT | Performed by: SURGERY

## 2024-06-14 PROCEDURE — 25000003 PHARM REV CODE 250: Performed by: NURSE ANESTHETIST, CERTIFIED REGISTERED

## 2024-06-14 PROCEDURE — D9220A PRA ANESTHESIA: Mod: ,,, | Performed by: NURSE ANESTHETIST, CERTIFIED REGISTERED

## 2024-06-14 RX ORDER — PROPOFOL 10 MG/ML
VIAL (ML) INTRAVENOUS
Status: DISCONTINUED | OUTPATIENT
Start: 2024-06-14 | End: 2024-06-14

## 2024-06-14 RX ORDER — LIDOCAINE HYDROCHLORIDE 20 MG/ML
INJECTION INTRAVENOUS
Status: DISCONTINUED | OUTPATIENT
Start: 2024-06-14 | End: 2024-06-14

## 2024-06-14 RX ORDER — SODIUM CHLORIDE 9 MG/ML
INJECTION, SOLUTION INTRAVENOUS CONTINUOUS
Status: DISCONTINUED | OUTPATIENT
Start: 2024-06-14 | End: 2024-06-15 | Stop reason: HOSPADM

## 2024-06-14 RX ORDER — SODIUM CHLORIDE, SODIUM LACTATE, POTASSIUM CHLORIDE, CALCIUM CHLORIDE 600; 310; 30; 20 MG/100ML; MG/100ML; MG/100ML; MG/100ML
INJECTION, SOLUTION INTRAVENOUS
Status: DISCONTINUED | OUTPATIENT
Start: 2024-06-14 | End: 2024-06-15 | Stop reason: HOSPADM

## 2024-06-14 RX ADMIN — PROPOFOL 150 MG: 10 INJECTION, EMULSION INTRAVENOUS at 11:06

## 2024-06-14 RX ADMIN — LIDOCAINE HYDROCHLORIDE 100 MG: 20 INJECTION, SOLUTION INTRAVENOUS at 11:06

## 2024-06-14 RX ADMIN — SODIUM CHLORIDE, POTASSIUM CHLORIDE, SODIUM LACTATE AND CALCIUM CHLORIDE: 600; 310; 30; 20 INJECTION, SOLUTION INTRAVENOUS at 10:06

## 2024-06-14 RX ADMIN — PROPOFOL 60 MG: 10 INJECTION, EMULSION INTRAVENOUS at 11:06

## 2024-06-14 NOTE — DISCHARGE INSTRUCTIONS
Follow-up with Dr. Spencer as scheduled    Diet:  as tolerated    Activity:  decrease activity today, no driving today, resume all activity tomorrow    Notify MD:  increased swelling of abdomen, difficulty breathing/swallowing, excessive nausea/vomiting, excessive bright red bleeding from mouth/vomit,    Medications:  continue your home medications, keep a list of your home medications at all times for emergencies.

## 2024-06-14 NOTE — PLAN OF CARE
Returned to room per stretcher. Awake, alert. No complaints. Iced water given and tolerating well.

## 2024-06-14 NOTE — ANESTHESIA POSTPROCEDURE EVALUATION
Anesthesia Post Evaluation    Patient: Princess Colindres    Procedure(s) Performed: * No procedures listed *    Final Anesthesia Type: MAC      Patient location during evaluation: floor  Patient participation: Yes- Able to Participate  Level of consciousness: awake and alert, awake and oriented  Post-procedure vital signs: reviewed and stable  Pain management: adequate  Airway patency: patent    PONV status at discharge: No PONV  Anesthetic complications: no      Cardiovascular status: blood pressure returned to baseline  Respiratory status: unassisted, room air and spontaneous ventilation  Hydration status: euvolemic  Follow-up not needed.              Vitals Value Taken Time   /106 06/14/24 1015   Temp 36.7 °C (98.1 °F) 06/14/24 1015   Pulse 88 06/14/24 1134   Resp 22 06/14/24 1015   SpO2 99 % 06/14/24 1015         No case tracking events are documented in the log.      Pain/Fermin Score: No data recorded

## 2024-06-18 NOTE — DISCHARGE SUMMARY
[facility]  Discharge Note  Short Stay    EGD      OUTCOME: Patient tolerated treatment/procedure well without complication and is now ready for discharge.    DISPOSITION: Home or Self Care    FINAL DIAGNOSIS:  Hiatal hernia    FOLLOWUP: In clinic    DISCHARGE INSTRUCTIONS:    Discharge Procedure Orders   Diet general         Clinical Reference Documents Added to Patient Instructions         Document    H. PYLORI TEST (ENGLISH)    UPPER GI ENDOSCOPY (ENGLISH)            TIME SPENT ON DISCHARGE: 5 minutes

## 2024-06-25 DIAGNOSIS — G89.29 CHRONIC BILATERAL LOW BACK PAIN WITHOUT SCIATICA: ICD-10-CM

## 2024-06-25 DIAGNOSIS — M54.50 CHRONIC BILATERAL LOW BACK PAIN WITHOUT SCIATICA: ICD-10-CM

## 2024-06-25 RX ORDER — GABAPENTIN 300 MG/1
300 CAPSULE ORAL 3 TIMES DAILY
Qty: 90 CAPSULE | Refills: 0 | Status: SHIPPED | OUTPATIENT
Start: 2024-06-25

## 2024-06-28 DIAGNOSIS — I10 HYPERTENSION, UNSPECIFIED TYPE: ICD-10-CM

## 2024-06-28 DIAGNOSIS — F32.A DEPRESSION, UNSPECIFIED DEPRESSION TYPE: ICD-10-CM

## 2024-06-28 RX ORDER — CITALOPRAM 40 MG/1
40 TABLET, FILM COATED ORAL DAILY
Qty: 30 TABLET | Refills: 11 | Status: SHIPPED | OUTPATIENT
Start: 2024-06-28

## 2024-06-28 RX ORDER — LISINOPRIL 30 MG/1
30 TABLET ORAL DAILY
Qty: 30 TABLET | Refills: 11 | Status: SHIPPED | OUTPATIENT
Start: 2024-06-28

## 2024-07-02 ENCOUNTER — HOSPITAL ENCOUNTER (OUTPATIENT)
Dept: RADIOLOGY | Facility: HOSPITAL | Age: 29
Discharge: HOME | End: 2024-07-02
Attending: SURGERY
Payer: COMMERCIAL

## 2024-07-02 DIAGNOSIS — K21.9 ESOPHAGEAL REFLUX: ICD-10-CM

## 2024-07-02 PROCEDURE — 78264 GASTRIC EMPTYING IMG STUDY: CPT | Mod: TC

## 2024-07-02 PROCEDURE — A9541 TC99M SULFUR COLLOID: HCPCS | Performed by: SURGERY

## 2024-07-02 RX ORDER — TECHNETIUM TC 99M SULFUR COLLOID 2 MG
2 KIT MISCELLANEOUS
Status: COMPLETED | OUTPATIENT
Start: 2024-07-02 | End: 2024-07-02

## 2024-07-02 RX ADMIN — TECHNETIUM TC 99M SULFUR COLLOID 2 MILLICURIE: KIT at 07:07

## 2024-08-16 ENCOUNTER — OFFICE VISIT (OUTPATIENT)
Dept: FAMILY MEDICINE | Facility: CLINIC | Age: 29
End: 2024-08-16
Payer: COMMERCIAL

## 2024-08-16 VITALS
TEMPERATURE: 98 F | WEIGHT: 270 LBS | SYSTOLIC BLOOD PRESSURE: 138 MMHG | HEART RATE: 92 BPM | OXYGEN SATURATION: 97 % | HEIGHT: 62 IN | DIASTOLIC BLOOD PRESSURE: 88 MMHG | BODY MASS INDEX: 49.69 KG/M2

## 2024-08-16 DIAGNOSIS — F33.1 MODERATE EPISODE OF RECURRENT MAJOR DEPRESSIVE DISORDER: Primary | ICD-10-CM

## 2024-08-16 DIAGNOSIS — R00.2 PALPITATIONS: ICD-10-CM

## 2024-08-16 DIAGNOSIS — F41.1 GENERALIZED ANXIETY DISORDER: ICD-10-CM

## 2024-08-16 LAB
T4 FREE SERPL-MCNC: 1.11 NG/DL (ref 0.78–2.19)
TSH SERPL-ACNC: 0.37 UIU/ML (ref 0.36–3.74)

## 2024-08-16 PROCEDURE — 84439 ASSAY OF FREE THYROXINE: CPT | Performed by: NURSE PRACTITIONER

## 2024-08-16 PROCEDURE — 84443 ASSAY THYROID STIM HORMONE: CPT | Performed by: NURSE PRACTITIONER

## 2024-08-16 RX ORDER — BREXPIPRAZOLE 0.5 MG/1
0.5 TABLET ORAL DAILY
Qty: 30 TABLET | Refills: 11 | Status: SHIPPED | OUTPATIENT
Start: 2024-08-16

## 2024-08-16 RX ORDER — ALPRAZOLAM 0.5 MG/1
0.5 TABLET ORAL 2 TIMES DAILY PRN
Qty: 20 TABLET | Refills: 0 | Status: SHIPPED | OUTPATIENT
Start: 2024-08-16

## 2024-08-16 RX ORDER — SUCRALFATE 1 G/1
1 TABLET ORAL 3 TIMES DAILY
COMMUNITY
Start: 2024-06-25

## 2024-08-16 RX ORDER — FAMOTIDINE 20 MG/1
20 TABLET, FILM COATED ORAL 3 TIMES DAILY
COMMUNITY
Start: 2024-06-25

## 2024-08-16 RX ORDER — BISMUTH SUBSALICYLATE 262 MG/1
1 TABLET ORAL 3 TIMES DAILY
COMMUNITY
Start: 2024-06-25

## 2024-08-16 NOTE — PROGRESS NOTES
"Patient ID: Princess Colindres  : 1995    Chief Complaint: Anxiety and Depression    Allergies: Patient is allergic to codeine.     History of Present Illness:  The patient is a 28 y.o.    Black or   White female who presents to clinic for follow up on Anxiety and Depression     Complains of depression and anxiety. Has been on Celexa for a prolonged period of time. She felt it was not working well anymore so Vraylar was added a few months ago and at follow up she reported that her mood was improved as was her anxiety. She tells me that it worked for a while then stopped working. She is tearful in clinic. Tells me that she is having panic attacks now, pretty frequently more predominant at night but have occurred in the daytime as well. She feels that her initial symptoms are dyspnea, followed by chest tightness and then a racing heart; she identifies all of this as a panic attack. Also having "hot flashes at night."    She reports that she had an adverse effect to multiple antidepressants in the past (including Zoloft, Effexor, Wellbutrin). She adamantly denies SI/HI.     Social History:  reports that she has never smoked. She has never been exposed to tobacco smoke. She has never used smokeless tobacco. She reports that she does not currently use alcohol. She reports that she does not use drugs.    Past Medical History:  has a past medical history of Acute angle-closure glaucoma, Asthma, Cervical radiculopathy, Family history of diabetes mellitus, Fibromyalgia, BRITNEY (generalized anxiety disorder), Hypertension, Insomnia, Low TSH level, Migraine headache, Ovarian cyst, and PCOS (polycystic ovarian syndrome).    Current Medications:  Current Outpatient Medications   Medication Instructions    albuterol (PROVENTIL/VENTOLIN HFA) 90 mcg/actuation inhaler 2 puffs, Inhalation    ALPRAZolam (XANAX) 0.5 mg, Oral, 2 times daily PRN    CARAFATE 1 g, Oral, 3 times daily    cholecalciferol (vitamin D3) " "50,000 Units, Oral, Every 7 days    citalopram (CELEXA) 40 mg, Oral, Daily    cloNIDine (CATAPRES) 0.3 mg, Oral, Nightly    cyclobenzaprine (FLEXERIL) 10 mg, Oral, 2 times daily PRN    FLONASE ALLERGY RELIEF 50 mcg/actuation nasal spray 1 spray, Each Nostril, 2 times daily    gabapentin (NEURONTIN) 300 mg, Oral, 3 times daily    hydroCHLOROthiazide (HYDRODIURIL) 12.5 mg, Oral, Daily    ibuprofen 20 mg/mL oral liquid SMARTSI Milliliter(s) By Mouth Every 6 Hours PRN    lisinopriL (PRINIVIL,ZESTRIL) 30 mg, Oral, Daily    naproxen sodium (ANAPROX) 220 mg, Oral, Daily PRN    PEPCID 20 mg, Oral, 3 times daily    PEPTO-BISMOL 262 mg Tab 1 tablet, Oral, 3 times daily    REXULTI 0.5 mg, Oral, Daily    UBRELVY 100 mg, Oral, Daily PRN    zolpidem (AMBIEN) 10 mg, Oral, Nightly PRN    ZyrTEC 10 mg, Oral       Review of Systems   See HPI    Visit Vitals  /88 (BP Location: Left arm)   Pulse 92   Temp 98.1 °F (36.7 °C) (Temporal)   Ht 5' 2" (1.575 m)   Wt 122.5 kg (270 lb)   SpO2 97%   BMI 49.38 kg/m²       Physical Exam  Vitals reviewed.   Constitutional:       Appearance: Normal appearance. She is obese.   Eyes:      Conjunctiva/sclera: Conjunctivae normal.   Cardiovascular:      Heart sounds: Normal heart sounds.   Pulmonary:      Breath sounds: Normal breath sounds.   Skin:     General: Skin is warm and dry.   Neurological:      Mental Status: She is oriented to person, place, and time.              Assessment & Plan:  1. Moderate episode of recurrent major depressive disorder  Overview:  On Celexa 40 mg daily.   Vraylar 1.5 mg added (2023)  Vraylar increased to 3 mg (2024)    Assessment & Plan:  Continue Celexa; stop Vraylar and start low dose Rexulti.   Educated patient on the risks of serotonin based medications such as serotonin modulators and SSRIs/SNRIs including common side effects of nausea, GI upset, headache dizziness as well as the rare risk for worsening symptoms of depression including " development of suicidal thoughts or ideations, and serotonin syndrome.   Discussed benefits of medication not becoming noticeable until up to 6 weeks from start date.   Exercise daily. Get sunlight daily.  Practice positive phrases and repeat throughout the day, along with yoga and relaxation techniques.  Establish good social support, make changes to reduce stress.  Encourage counseling.   Reports any symptoms of suicidal/homicidal ideations or self harm immediately. If clinic is closed, go to nearest emergency room.        Orders:  -     brexpiprazole (REXULTI) 0.5 mg Tab; Take 1 tablet (0.5 mg total) by mouth Daily.  Dispense: 30 tablet; Refill: 11    2. Generalized anxiety disorder  Overview:  On Celexa.  Vraylar discontinued (08/2024) and started Rexulti  Xanax 0.5 mg BID prn panic attacks; use sparingly    Orders:  -     ALPRAZolam (XANAX) 0.5 MG tablet; Take 1 tablet (0.5 mg total) by mouth 2 (two) times daily as needed for Anxiety.  Dispense: 20 tablet; Refill: 0    3. Palpitations  Comments:  Will draw TSH and free T4 to r/o thyroid dysfunction.  Orders:  -     TSH; Future; Expected date: 08/16/2024  -     T4, Free; Future; Expected date: 08/16/2024         Future Appointments   Date Time Provider Department Center   8/21/2024  2:00 PM BOTOX, [facility] NEUROLOGY [facility] NEURO Gerardo Un   8/28/2024 10:15 AM [facility]       Follow up for 1 wk f/u, depression/anxiety. Call sooner if needed.    GAYATRI Burrows    Lab Frequency Next Occurrence   Ambulatory referral/consult to Allergy Once 11/06/2023   Ambulatory referral/consult to Sleep Disorders Once 12/05/2023   Prolactin Once 01/03/2024   Ambulatory referral/consult to General Surgery Once 01/11/2024   Ambulatory referral/consult to Sleep Disorders Once 02/06/2024   Ambulatory referral/consult to General Surgery Once 05/16/2024

## 2024-08-16 NOTE — ASSESSMENT & PLAN NOTE
Continue Celexa; stop Vraylar and start low dose Rexulti.   Educated patient on the risks of serotonin based medications such as serotonin modulators and SSRIs/SNRIs including common side effects of nausea, GI upset, headache dizziness as well as the rare risk for worsening symptoms of depression including development of suicidal thoughts or ideations, and serotonin syndrome.   Discussed benefits of medication not becoming noticeable until up to 6 weeks from start date.   Exercise daily. Get sunlight daily.  Practice positive phrases and repeat throughout the day, along with yoga and relaxation techniques.  Establish good social support, make changes to reduce stress.  Encourage counseling.   Reports any symptoms of suicidal/homicidal ideations or self harm immediately. If clinic is closed, go to nearest emergency room.      
Unable to assess

## 2024-08-19 ENCOUNTER — TELEPHONE (OUTPATIENT)
Dept: FAMILY MEDICINE | Facility: CLINIC | Age: 29
End: 2024-08-19
Payer: COMMERCIAL

## 2024-08-19 DIAGNOSIS — M54.50 CHRONIC BILATERAL LOW BACK PAIN WITHOUT SCIATICA: ICD-10-CM

## 2024-08-19 DIAGNOSIS — G89.29 CHRONIC BILATERAL LOW BACK PAIN WITHOUT SCIATICA: ICD-10-CM

## 2024-08-19 DIAGNOSIS — F33.1 MODERATE EPISODE OF RECURRENT MAJOR DEPRESSIVE DISORDER: ICD-10-CM

## 2024-08-19 RX ORDER — BREXPIPRAZOLE 0.5 MG/1
0.5 TABLET ORAL DAILY
Qty: 30 TABLET | Refills: 11 | Status: SHIPPED | OUTPATIENT
Start: 2024-08-19

## 2024-08-19 RX ORDER — GABAPENTIN 300 MG/1
300 CAPSULE ORAL 3 TIMES DAILY
Qty: 90 CAPSULE | Refills: 0 | Status: SHIPPED | OUTPATIENT
Start: 2024-08-19

## 2024-08-20 NOTE — CONSULTS
[facility] - Hematology [facility]  Response for E-Consult     Patient Name: Princess Colindres  MRN: [id number]  Primary Care Provider: Florencia Em FNP-C   Requesting Provider: Florencia Em FNP*  E-Consult to Hemonc  Consult performed by: Jessica Owusu MD  Consult ordered by: Florencia Em FNP-C  Reason for consult: fatigue and microcytosis          Recommendation:Labs are all consistent. Patient has hgb C trait, which causes microcytosis and hypochromia, but not anemia. I would look at other causes of her fatigue wether endocrine or hormone related?    Contingency: If labs worsen or other abnormalities surface, please refer to hem/onc.    Total time of Consultation: 15 minute    I did not speak to the requesting provider verbally about this.     *This eConsult is based on the clinical data available to me and is furnished without benefit of a physical examination. The eConsult will need to be interpreted in light of any clinical issues or changes in patient status not available to me at the time of filing this eConsults. Significant changes in patient condition or level of acuity should result in immediate formal consultation and reevaluation. Please alert me if you have further questions.    Thank you for this eConsult referral.     Jessica Owusu MD  [facility] - Hematology [facility]       Patient Education   Preventive Care Advice   This is general advice given by our system to help you stay healthy. However, your care team may have specific advice just for you. Please talk to your care team about your preventive care needs.  Nutrition  Eat 5 or more servings of fruits and vegetables each day.  Try wheat bread, brown rice and whole grain pasta (instead of white bread, rice, and pasta).  Get enough calcium and vitamin D. Check the label on foods and aim for 100% of the RDA (recommended daily allowance).  Lifestyle  Exercise at least 150 minutes each week  (30 minutes a day, 5 days a week).  Do muscle strengthening activities 2 days a week. These help control your weight and prevent disease.  No smoking.  Wear sunscreen to prevent skin cancer.  Have a dental exam and cleaning every 6 months.  Yearly exams  See your health care team every year to talk about:  Any changes in your health.  Any medicines your care team has prescribed.  Preventive care, family planning, and ways to prevent chronic diseases.  Shots (vaccines)   HPV shots (up to age 26), if you've never had them before.  Hepatitis B shots (up to age 59), if you've never had them before.  COVID-19 shot: Get this shot when it's due.  Flu shot: Get a flu shot every year.  Tetanus shot: Get a tetanus shot every 10 years.  Pneumococcal, hepatitis A, and RSV shots: Ask your care team if you need these based on your risk.  Shingles shot (for age 50 and up)  General health tests  Diabetes screening:  Starting at age 35, Get screened for diabetes at least every 3 years.  If you are younger than age 35, ask your care team if you should be screened for diabetes.  Cholesterol test: At age 39, start having a cholesterol test every 5 years, or more often if advised.  Bone density scan (DEXA): At age 50, ask your care team if you should have this scan for osteoporosis (brittle bones).  Hepatitis C: Get tested at least once in your life.  STIs (sexually  transmitted infections)  Before age 24: Ask your care team if you should be screened for STIs.  After age 24: Get screened for STIs if you're at risk. You are at risk for STIs (including HIV) if:  You are sexually active with more than one person.  You don't use condoms every time.  You or a partner was diagnosed with a sexually transmitted infection.  If you are at risk for HIV, ask about PrEP medicine to prevent HIV.  Get tested for HIV at least once in your life, whether you are at risk for HIV or not.  Cancer screening tests  Cervical cancer screening: If you have a cervix, begin getting regular cervical cancer screening tests starting at age 21.  Breast cancer scan (mammogram): If you've ever had breasts, begin having regular mammograms starting at age 40. This is a scan to check for breast cancer.  Colon cancer screening: It is important to start screening for colon cancer at age 45.  Have a colonoscopy test every 10 years (or more often if you're at risk) Or, ask your provider about stool tests like a FIT test every year or Cologuard test every 3 years.  To learn more about your testing options, visit:   .  For help making a decision, visit:   https://bit.ly/tx70500.  Prostate cancer screening test: If you have a prostate, ask your care team if a prostate cancer screening test (PSA) at age 55 is right for you.  Lung cancer screening: If you are a current or former smoker ages 50 to 80, ask your care team if ongoing lung cancer screenings are right for you.  For informational purposes only. Not to replace the advice of your health care provider. Copyright   2023 [employer]. All rights reserved. Clinically reviewed by the [facility] Transitions Program. [employer] 150811 - REV 01/24.

## 2024-08-21 ENCOUNTER — PROCEDURE VISIT (OUTPATIENT)
Dept: NEUROLOGY | Facility: CLINIC | Age: 29
End: 2024-08-21
Payer: COMMERCIAL

## 2024-08-21 VITALS
SYSTOLIC BLOOD PRESSURE: 148 MMHG | BODY MASS INDEX: 50.61 KG/M2 | HEART RATE: 94 BPM | OXYGEN SATURATION: 97 % | RESPIRATION RATE: 12 BRPM | HEIGHT: 62 IN | WEIGHT: 275 LBS | TEMPERATURE: 98 F | DIASTOLIC BLOOD PRESSURE: 101 MMHG

## 2024-08-21 DIAGNOSIS — G43.711 CHRONIC MIGRAINE WITHOUT AURA, INTRACTABLE, WITH STATUS MIGRAINOSUS: Primary | ICD-10-CM

## 2024-08-21 PROCEDURE — 64615 CHEMODENERV MUSC MIGRAINE: CPT | Mod: PBBFAC | Performed by: PSYCHIATRY & NEUROLOGY

## 2024-08-21 PROCEDURE — 64615 CHEMODENERV MUSC MIGRAINE: CPT | Mod: S$PBB,,, | Performed by: PSYCHIATRY & NEUROLOGY

## 2024-08-21 RX ADMIN — ONABOTULINUMTOXINA 200 UNITS: 200 INJECTION, POWDER, LYOPHILIZED, FOR SOLUTION INTRADERMAL; INTRAMUSCULAR at 02:08

## 2024-08-21 NOTE — PROCEDURES
[facility] Neurology Outpatient Botox Procedure Note    History of Present Illness     This is 28 y.o. female with history of morbid obesity, Chiari malformation type 1, anxiety, PCOS, insomnia, hypertension who is referred for chronic migraine without aura with bruxism.  Patient is here for Botox 5.     Age of Onset : 10 years old     Headache Description:   occipital, paracervical, stabbing, throbbing, pressure sensation, moderate to severe, lasting >24 hours to 7 days, with nausea, with photophobia and phonophobia.   Left cheek, lower jaw, gum numbness and occasional periorbital throbbing pain.  Also reports jaw clicking sensation at times.     Baseline Headache Frequency: 15 migraine headache days per month for the past 3 years.   Interval Headache Frequency:  2 migraine headache days per month     Current Prophylactic  Erenumab 70 mg once per month (12/12/2022 to present)  Lisinopril 30 mg daily  Celexa 40 mg daily  Gabapentin 300 mg 3 times a day (12/20/2022 to present)     Current Abortive  Ubrogepant 100 mg twice a day as needed (12/12/2022 to present): effective.   Cyclobenzaprine     Review of System      reviewed. stable.    Focused Exam     Reviewed.  Stable.    Assessment     This is 28 y.o. female with history of morbid obesity, Chiari malformation type 1, anxiety, PCOS, insomnia, hypertension who is referred for chronic migraine without aura with bruxism.  Patient is here for Botox 5.     Procedure     Date of procedure: 8/21/2024    Procedure: Chronic Migraine Chemodenervation with Botulinum toxin    The patient was identified and informed consent was reviewed with the patient, and we discussed the risks, benefits and alternatives. Specifically, we discussed the risks of bleeding, infection and nerve injury with worsened pain and function. Specifically, we discussed the most frequently reported adverse reactions following injection of BOTOX include headache (5%), eyelid ptosis (4%), muscular weakness (4%),  bronchitis (3%), injection-site pain (3%), musculoskeletal pain (3%), myalgia (3%), facial paresis (2%), hypertension (2%), and muscle spasms (2%). The patient verbalized an understanding of these risks and the symptoms and the potentially catastrophic consequences of this occurrence. The patient verbalized an understanding that if she should begin to have these symptoms that she should immediately go to the nearest emergency room for evaluation. The patient was then positioned. The injection sites were identified and was prepped with Alcohol. 4cc of preservative free normal saline was mixed with 200 units of Botox. A 30-gauge, 0.5 inch needle was then used to inject a total 155 units of Botox. Muscles injected as below. Patient tolerated the procedure well with no complaints.    A. : Botox dosage: 10 units in 2 sites Right: 5 Left: 5   B. Procerus Botox dosage: 5 Units in 1 site   C. Frontalis Botox dosage: 20 Units divided in 4 sites Right: 10 Left: 10   D. Temporalis Botox dosage: 40 Units divided in 8 sites Right: 20 Left: 20   E. Occipitalis Botox dosage 30 Units divided in 6 sites Right: 15 Left: 15   F. Cervical Paraspinal Botox dosage: 20 Units divided in 4 sites: Right 10 Left: 10   G. Trapezius Botox dosage: 30 Units divided in 6 sites: Right: 15 Left: 15     Total Units Injected: 155 units   Total Units discarded: 45 units     Follow Up       RTC Botox      Mary Fried MD   [facility] General Neurology

## 2024-08-26 ENCOUNTER — TELEPHONE (OUTPATIENT)
Dept: FAMILY MEDICINE | Facility: CLINIC | Age: 29
End: 2024-08-26
Payer: COMMERCIAL

## 2024-08-26 DIAGNOSIS — G47.00 INSOMNIA, UNSPECIFIED TYPE: ICD-10-CM

## 2024-08-26 DIAGNOSIS — M79.7 FIBROMYALGIA: ICD-10-CM

## 2024-08-26 DIAGNOSIS — I10 HYPERTENSION, UNSPECIFIED TYPE: ICD-10-CM

## 2024-08-26 RX ORDER — LISINOPRIL 30 MG/1
30 TABLET ORAL DAILY
Qty: 30 TABLET | Refills: 11 | Status: SHIPPED | OUTPATIENT
Start: 2024-08-26

## 2024-08-26 RX ORDER — CYCLOBENZAPRINE HCL 10 MG
10 TABLET ORAL 2 TIMES DAILY PRN
Qty: 30 TABLET | Refills: 5 | Status: SHIPPED | OUTPATIENT
Start: 2024-08-26

## 2024-08-26 RX ORDER — ZOLPIDEM TARTRATE 10 MG/1
10 TABLET ORAL NIGHTLY PRN
Qty: 30 TABLET | Refills: 2 | Status: SHIPPED | OUTPATIENT
Start: 2024-08-26

## 2024-08-26 RX ORDER — ZOLPIDEM TARTRATE 10 MG/1
10 TABLET ORAL NIGHTLY PRN
Qty: 30 TABLET | Refills: 0 | Status: CANCELLED | OUTPATIENT
Start: 2024-08-26

## 2024-08-26 NOTE — TELEPHONE ENCOUNTER
Pt also states she is out of her medication completely and needs refill to get her through until her appt. On 8/28/24

## 2024-08-26 NOTE — TELEPHONE ENCOUNTER
Pt states symptoms include cough , HA, nausea, bilateral ear infection, chest congestion, most c/o nausea and ear aches. [facility] in [place]

## 2024-09-30 ENCOUNTER — TELEPHONE (OUTPATIENT)
Dept: FAMILY MEDICINE | Facility: CLINIC | Age: 29
End: 2024-09-30
Payer: COMMERCIAL

## 2024-09-30 NOTE — TELEPHONE ENCOUNTER
"She said that for the last week her depression and anxiety has been progressively getting worse. She has not been able to leave her home nor go to work. She reported that she never noticed a super big change when she started the [employer] but feels as if it isn't working at all now. She has not taken any alprazolam as she was afraid of it becoming a habit. She isn't sleeping and when she does sleep, she has "weird" dreams. She also said that she has a decreased appetite. She denies any thoughts of suicide or self harm, just feels very hopeless with negative thoughts. She is coming on 10/3 but I did tell her that if someone cancels, we will call her.   "

## 2024-10-10 DIAGNOSIS — M54.50 CHRONIC BILATERAL LOW BACK PAIN WITHOUT SCIATICA: ICD-10-CM

## 2024-10-10 DIAGNOSIS — G89.29 CHRONIC BILATERAL LOW BACK PAIN WITHOUT SCIATICA: ICD-10-CM

## 2024-10-11 RX ORDER — GABAPENTIN 300 MG/1
300 CAPSULE ORAL 3 TIMES DAILY
Qty: 90 CAPSULE | Refills: 0 | OUTPATIENT
Start: 2024-10-11

## 2024-10-21 ENCOUNTER — TELEPHONE (OUTPATIENT)
Dept: FAMILY MEDICINE | Facility: CLINIC | Age: 29
End: 2024-10-21
Payer: COMMERCIAL

## 2024-10-21 DIAGNOSIS — F33.42 RECURRENT MAJOR DEPRESSIVE DISORDER, IN FULL REMISSION: Primary | ICD-10-CM

## 2024-10-21 RX ORDER — BREXPIPRAZOLE 0.5 MG/1
0.5 TABLET ORAL DAILY
Qty: 7 TABLET | Refills: 0 | COMMUNITY
Start: 2024-10-21

## 2024-10-28 ENCOUNTER — TELEPHONE (OUTPATIENT)
Dept: FAMILY MEDICINE | Facility: CLINIC | Age: 29
End: 2024-10-28
Payer: COMMERCIAL

## 2024-10-28 DIAGNOSIS — F33.42 RECURRENT MAJOR DEPRESSIVE DISORDER, IN FULL REMISSION: Primary | ICD-10-CM

## 2024-10-28 RX ORDER — BREXPIPRAZOLE 0.5 MG/1
0.5 TABLET ORAL DAILY
Qty: 7 TABLET | Refills: 0 | COMMUNITY
Start: 2024-10-28

## 2024-11-20 ENCOUNTER — PROCEDURE VISIT (OUTPATIENT)
Dept: NEUROLOGY | Facility: CLINIC | Age: 29
End: 2024-11-20
Payer: MEDICAID

## 2024-11-20 VITALS
SYSTOLIC BLOOD PRESSURE: 138 MMHG | OXYGEN SATURATION: 97 % | TEMPERATURE: 98 F | HEART RATE: 100 BPM | HEIGHT: 62 IN | RESPIRATION RATE: 20 BRPM | DIASTOLIC BLOOD PRESSURE: 95 MMHG | WEIGHT: 274.81 LBS | BODY MASS INDEX: 50.57 KG/M2

## 2024-11-20 DIAGNOSIS — G43.711 CHRONIC MIGRAINE WITHOUT AURA, INTRACTABLE, WITH STATUS MIGRAINOSUS: Primary | ICD-10-CM

## 2024-11-20 DIAGNOSIS — G43.719 INTRACTABLE CHRONIC MIGRAINE WITHOUT AURA AND WITHOUT STATUS MIGRAINOSUS: ICD-10-CM

## 2024-11-20 PROCEDURE — 64615 CHEMODENERV MUSC MIGRAINE: CPT | Mod: PBBFAC | Performed by: PSYCHIATRY & NEUROLOGY

## 2024-11-20 PROCEDURE — 64615 CHEMODENERV MUSC MIGRAINE: CPT | Mod: S$PBB,,, | Performed by: PSYCHIATRY & NEUROLOGY

## 2024-11-20 NOTE — PROCEDURES
[facility] Neurology Outpatient Botox Procedure Note    History of Present Illness     This is 28 y.o. female with history of morbid obesity, Chiari malformation type 1, anxiety, PCOS, insomnia, hypertension who is referred for chronic migraine without aura with bruxism.  Patient is here for Botox 6.     Age of Onset : 10 years old     Headache Description:   occipital, paracervical, stabbing, throbbing, pressure sensation, moderate to severe, lasting >24 hours to 7 days, with nausea, with photophobia and phonophobia.   Left cheek, lower jaw, gum numbness and occasional periorbital throbbing pain.  Also reports jaw clicking sensation at times.     Baseline Headache Frequency: 15 migraine headache days per month for the past 3 years.   Interval Headache Frequency:  2 migraine headache days per month     Current Prophylactic  Erenumab 70 mg once per month (12/12/2022 to present)  Lisinopril 30 mg daily  Celexa 40 mg daily  Gabapentin 300 mg 3 times a day (12/20/2022 to present)     Current Abortive  Ubrogepant 100 mg twice a day as needed (12/12/2022 to present): effective.   Cyclobenzaprine     Review of System      reviewed. stable.    Focused Exam     Reviewed.  Stable.    Assessment     This is 28 y.o. female with history of morbid obesity, Chiari malformation type 1, anxiety, PCOS, insomnia, hypertension who is referred for chronic migraine without aura with bruxism.  Patient is here for Botox 6.     Procedure     Date of procedure: 11/20/2024    Procedure: Chronic Migraine Chemodenervation with Botulinum toxin    The patient was identified and informed consent was reviewed with the patient, and we discussed the risks, benefits and alternatives. Specifically, we discussed the risks of bleeding, infection and nerve injury with worsened pain and function. Specifically, we discussed the most frequently reported adverse reactions following injection of BOTOX include headache (5%), eyelid ptosis (4%), muscular weakness  (4%), bronchitis (3%), injection-site pain (3%), musculoskeletal pain (3%), myalgia (3%), facial paresis (2%), hypertension (2%), and muscle spasms (2%). The patient verbalized an understanding of these risks and the symptoms and the potentially catastrophic consequences of this occurrence. The patient verbalized an understanding that if she should begin to have these symptoms that she should immediately go to the nearest emergency room for evaluation. The patient was then positioned. The injection sites were identified and was prepped with Alcohol. 4cc of preservative free normal saline was mixed with 200 units of Botox. A 30-gauge, 0.5 inch needle was then used to inject a total 155 units of Botox. Muscles injected as below. Patient tolerated the procedure well with no complaints.    A. : Botox dosage: 10 units in 2 sites Right: 5 Left: 5   B. Procerus Botox dosage: 5 Units in 1 site   C. Frontalis Botox dosage: 20 Units divided in 4 sites Right: 10 Left: 10   D. Temporalis Botox dosage: 40 Units divided in 8 sites Right: 20 Left: 20   E. Occipitalis Botox dosage 30 Units divided in 6 sites Right: 15 Left: 15   F. Cervical Paraspinal Botox dosage: 20 Units divided in 4 sites: Right 10 Left: 10   G. Trapezius Botox dosage: 30 Units divided in 6 sites: Right: 15 Left: 15     Total Units Injected: 155 units   Total Units discarded: 45 units     Follow Up       RTC Botox      Mary Fried MD   [facility] General Neurology

## 2024-11-25 DIAGNOSIS — M79.7 FIBROMYALGIA: ICD-10-CM

## 2024-11-25 DIAGNOSIS — G47.00 INSOMNIA, UNSPECIFIED TYPE: ICD-10-CM

## 2024-11-25 DIAGNOSIS — G43.909 MIGRAINE WITHOUT STATUS MIGRAINOSUS, NOT INTRACTABLE, UNSPECIFIED MIGRAINE TYPE: ICD-10-CM

## 2024-11-25 DIAGNOSIS — G89.29 CHRONIC BILATERAL LOW BACK PAIN WITHOUT SCIATICA: ICD-10-CM

## 2024-11-25 DIAGNOSIS — M54.50 CHRONIC BILATERAL LOW BACK PAIN WITHOUT SCIATICA: ICD-10-CM

## 2024-11-26 RX ORDER — UBROGEPANT 100 MG/1
100 TABLET ORAL DAILY PRN
Qty: 16 TABLET | Refills: 5 | Status: SHIPPED | OUTPATIENT
Start: 2024-11-26

## 2024-11-26 RX ORDER — ZOLPIDEM TARTRATE 10 MG/1
10 TABLET ORAL NIGHTLY PRN
Qty: 30 TABLET | Refills: 2 | Status: SHIPPED | OUTPATIENT
Start: 2024-11-26

## 2024-11-26 RX ORDER — GABAPENTIN 300 MG/1
300 CAPSULE ORAL 3 TIMES DAILY
Qty: 90 CAPSULE | Refills: 5 | Status: SHIPPED | OUTPATIENT
Start: 2024-11-26

## 2024-11-26 RX ORDER — CYCLOBENZAPRINE HCL 10 MG
10 TABLET ORAL 2 TIMES DAILY PRN
Qty: 30 TABLET | Refills: 5 | Status: SHIPPED | OUTPATIENT
Start: 2024-11-26

## 2024-11-27 NOTE — TELEPHONE ENCOUNTER
I called pt to see if she has picked up the rx. I am unable to do a PA d/t it pulling [place] and that information is not in her chart. I left a voicemail for her to call back.

## 2024-12-19 ENCOUNTER — TELEPHONE (OUTPATIENT)
Dept: FAMILY MEDICINE | Facility: CLINIC | Age: 29
End: 2024-12-19
Payer: MEDICAID

## 2024-12-19 ENCOUNTER — HOSPITAL ENCOUNTER (EMERGENCY)
Facility: HOSPITAL | Age: 29
Discharge: HOME | End: 2024-12-19
Payer: MEDICAID

## 2024-12-19 VITALS
SYSTOLIC BLOOD PRESSURE: 129 MMHG | WEIGHT: 253 LBS | HEIGHT: 62 IN | DIASTOLIC BLOOD PRESSURE: 110 MMHG | RESPIRATION RATE: 20 BRPM | BODY MASS INDEX: 46.56 KG/M2 | TEMPERATURE: 98 F | OXYGEN SATURATION: 99 % | HEART RATE: 83 BPM

## 2024-12-19 DIAGNOSIS — G43.909 MIGRAINE WITHOUT STATUS MIGRAINOSUS, NOT INTRACTABLE, UNSPECIFIED MIGRAINE TYPE: Primary | ICD-10-CM

## 2024-12-19 PROCEDURE — 99284 EMERGENCY DEPT VISIT MOD MDM: CPT | Mod: 25

## 2024-12-19 PROCEDURE — 63600175 PHARM REV CODE 636 W HCPCS: Performed by: NURSE PRACTITIONER

## 2024-12-19 PROCEDURE — 96372 THER/PROPH/DIAG INJ SC/IM: CPT | Performed by: NURSE PRACTITIONER

## 2024-12-19 PROCEDURE — 25000003 PHARM REV CODE 250: Performed by: NURSE PRACTITIONER

## 2024-12-19 RX ORDER — PROMETHAZINE HYDROCHLORIDE 25 MG/ML
25 INJECTION, SOLUTION INTRAMUSCULAR; INTRAVENOUS
Status: COMPLETED | OUTPATIENT
Start: 2024-12-19 | End: 2024-12-19

## 2024-12-19 RX ORDER — KETOROLAC TROMETHAMINE 30 MG/ML
60 INJECTION, SOLUTION INTRAMUSCULAR; INTRAVENOUS
Status: COMPLETED | OUTPATIENT
Start: 2024-12-19 | End: 2024-12-19

## 2024-12-19 RX ORDER — LORAZEPAM 1 MG/1
2 TABLET ORAL
Status: COMPLETED | OUTPATIENT
Start: 2024-12-19 | End: 2024-12-19

## 2024-12-19 RX ORDER — DEXAMETHASONE SODIUM PHOSPHATE 4 MG/ML
8 INJECTION, SOLUTION INTRA-ARTICULAR; INTRALESIONAL; INTRAMUSCULAR; INTRAVENOUS; SOFT TISSUE
Status: COMPLETED | OUTPATIENT
Start: 2024-12-19 | End: 2024-12-19

## 2024-12-19 RX ADMIN — KETOROLAC TROMETHAMINE 60 MG: 30 INJECTION, SOLUTION INTRAMUSCULAR at 11:12

## 2024-12-19 RX ADMIN — PROMETHAZINE HYDROCHLORIDE 25 MG: 25 INJECTION INTRAMUSCULAR; INTRAVENOUS at 11:12

## 2024-12-19 RX ADMIN — LORAZEPAM 2 MG: 1 TABLET ORAL at 11:12

## 2024-12-19 RX ADMIN — DEXAMETHASONE SODIUM PHOSPHATE 8 MG: 4 INJECTION, SOLUTION INTRA-ARTICULAR; INTRALESIONAL; INTRAMUSCULAR; INTRAVENOUS; SOFT TISSUE at 11:12

## 2024-12-19 NOTE — ED PROVIDER NOTES
Encounter Date: 2024       History     Chief Complaint   Patient presents with    Headache     PRESENTS TO ED PER POV WITH C/O LT. SIDED MIGRAINE HEADACHE WITH NAUSEA AND PHOTOSENSITIVITY ONSET 0300. STATES TAKE BOTOX FOR MIGRAINES.      29 year old female presents with a migraine headache that is not responding to her migraine medication at home.     The history is provided by the patient. No  was used.     Review of patient's allergies indicates:   Allergen Reactions    Codeine Anxiety, Itching, Nausea Only, Rash and Shortness Of Breath     Other reaction(s): Other (See Comments)  headaches       Past Medical History:   Diagnosis Date    Acute angle-closure glaucoma     bilateral    Asthma     Cervical radiculopathy     Family history of diabetes mellitus     Fibromyalgia     BRITNEY (generalized anxiety disorder)     Hypertension     Insomnia     Low TSH level 2023    Migraine headache     Ovarian cyst     PCOS (polycystic ovarian syndrome)      Past Surgical History:   Procedure Laterality Date     SECTION  2018    CHOLECYSTECTOMY  2024    LAPAROSCOPIC CHOLECYSTECTOMY WITH CHOLANGIOGRAPHY N/A 2024    Procedure: CHOLECYSTECTOMY, LAPAROSCOPIC, WITH CHOLANGIOGRAM;  Surgeon: Richard Spencer MD;  Location: [facility] OR;  Service: General;  Laterality: N/A;     Family History   Problem Relation Name Age of Onset    Melanoma Maternal Grandmother      Breast cancer Neg Hx      Cervical cancer Neg Hx      Uterine cancer Neg Hx      Colon cancer Neg Hx      Ovarian cancer Neg Hx       Social History     Tobacco Use    Smoking status: Never     Passive exposure: Never    Smokeless tobacco: Never   Substance Use Topics    Alcohol use: Not Currently    Drug use: Never     Review of Systems   Neurological:  Positive for headaches.   All other systems reviewed and are negative.      Physical Exam     Initial Vitals [24 1102]   BP Pulse Resp Temp SpO2   (!) 152/100 94  18 98 °F (36.7 °C) 99 %      MAP       --         Physical Exam    Nursing note and vitals reviewed.  Constitutional: She appears well-developed and well-nourished.   HENT:   Head: Normocephalic and atraumatic.   Eyes: Conjunctivae and EOM are normal. Pupils are equal, round, and reactive to light.   Neck: Neck supple.   Normal range of motion.  Cardiovascular:  Normal rate, regular rhythm, normal heart sounds and intact distal pulses.           Pulmonary/Chest: Breath sounds normal.   Abdominal: Abdomen is soft. Bowel sounds are normal.   Musculoskeletal:         General: Normal range of motion.      Cervical back: Normal range of motion and neck supple.     Neurological: She is alert and oriented to person, place, and time. She has normal strength. GCS score is 15. GCS eye subscore is 4. GCS verbal subscore is 5. GCS motor subscore is 6.   Migraine without aura, photosensitive, accompanied with nausea.     Skin: Skin is warm and dry. Capillary refill takes less than 2 seconds.   Psychiatric: She has a normal mood and affect. Her behavior is normal. Judgment and thought content normal.         ED Course   Procedures  Labs Reviewed - No data to display       Imaging Results    None          Medications   promethazine injection 25 mg (25 mg Intramuscular Given 12/19/24 1133)   dexAMETHasone injection 8 mg (8 mg Intramuscular Given 12/19/24 1132)   ketorolac injection 60 mg (60 mg Intramuscular Given 12/19/24 1133)   LORazepam tablet 2 mg (2 mg Oral Given 12/19/24 1133)     Medical Decision Making  Problems Addressed:  Migraine without status migrainosus, not intractable, unspecified migraine type: acute illness or injury    Risk  Prescription drug management.                                      Clinical Impression:  Final diagnoses:  [G43.909] Migraine without status migrainosus, not intractable, unspecified migraine type (Primary)          ED Disposition Condition    Discharge Stable          ED Prescriptions     None       Follow-up Information       Follow up With Specialties Details Why Contact Info    Florencia Em, FNP-C Family Medicine In 3 days If symptoms worsen [address]   [address]  [facility] [id number]  [phone]               Maria M Kearns FNP  12/19/24 1139       Maria M Kearns FNP  12/19/24 1143

## 2024-12-19 NOTE — TELEPHONE ENCOUNTER
I explained to Ramonenela that we do give Toradol injections; however, pt would need an appointment and Florencia does not have any openings at this time. I asked her to contact pt to let her know. She said that she did and pt stated she would go to the ER.

## 2024-12-19 NOTE — Clinical Note
"Princess Azul" Jens was seen and treated in our emergency department on 12/19/2024.  She may return to work on 12/20/2024.       If you have any questions or concerns, please don't hesitate to call.      Maria M Kearns, LU"

## 2024-12-23 ENCOUNTER — TELEPHONE (OUTPATIENT)
Dept: FAMILY MEDICINE | Facility: CLINIC | Age: 29
End: 2024-12-23
Payer: MEDICAID

## 2024-12-23 DIAGNOSIS — R11.2 NAUSEA AND VOMITING, UNSPECIFIED VOMITING TYPE: Primary | ICD-10-CM

## 2024-12-23 RX ORDER — ONDANSETRON 4 MG/1
4 TABLET, ORALLY DISINTEGRATING ORAL 2 TIMES DAILY
Qty: 20 TABLET | Refills: 0 | Status: SHIPPED | OUTPATIENT
Start: 2024-12-23

## 2024-12-23 NOTE — TELEPHONE ENCOUNTER
I called and notified pt. She asked for a work excuse. Ok to give one for today. She will come pick it up on Thursday.    patient refused

## 2024-12-23 NOTE — TELEPHONE ENCOUNTER
She is currently on Ubrelvy 100mg for abortive. She said that it normally helps. Thursday she took it and vomited right after so she ended up taking another one but she vomited a second time. That is when she went to the ER. She tried once today but vomited. She does not have anything for n/v but would like something dissolvable. Please advise

## 2024-12-30 ENCOUNTER — TELEPHONE (OUTPATIENT)
Dept: FAMILY MEDICINE | Facility: CLINIC | Age: 29
End: 2024-12-30
Payer: MEDICAID

## 2024-12-30 DIAGNOSIS — F33.42 RECURRENT MAJOR DEPRESSIVE DISORDER, IN FULL REMISSION: ICD-10-CM

## 2024-12-30 RX ORDER — BREXPIPRAZOLE 0.5 MG/1
0.5 TABLET ORAL DAILY
Qty: 7 TABLET | Refills: 0 | COMMUNITY
Start: 2024-12-30

## 2024-12-30 NOTE — TELEPHONE ENCOUNTER
Refill sent on 8/19/24 with 11 additional refills. I called and left a voicemail notifying pt that she would need to check with the pharmacy but to call back if she is having trouble refilling it.

## 2025-02-19 DIAGNOSIS — R11.2 NAUSEA AND VOMITING, UNSPECIFIED VOMITING TYPE: ICD-10-CM

## 2025-02-19 RX ORDER — ONDANSETRON 4 MG/1
TABLET, ORALLY DISINTEGRATING ORAL
Qty: 20 TABLET | Refills: 0 | Status: SHIPPED | OUTPATIENT
Start: 2025-02-19

## 2025-02-19 NOTE — TELEPHONE ENCOUNTER
Last seen in August and was supposed to f/u in 1 week. She has cancelled 6 appointments and no showed one. Please advise

## 2025-02-20 ENCOUNTER — OFFICE VISIT (OUTPATIENT)
Dept: FAMILY MEDICINE | Facility: CLINIC | Age: 30
End: 2025-02-20
Payer: COMMERCIAL

## 2025-02-20 VITALS
TEMPERATURE: 99 F | DIASTOLIC BLOOD PRESSURE: 86 MMHG | WEIGHT: 282.19 LBS | HEART RATE: 95 BPM | HEIGHT: 62 IN | SYSTOLIC BLOOD PRESSURE: 126 MMHG | BODY MASS INDEX: 51.93 KG/M2 | OXYGEN SATURATION: 98 %

## 2025-02-20 DIAGNOSIS — R05.9 COUGH, UNSPECIFIED TYPE: Primary | ICD-10-CM

## 2025-02-20 LAB
CTP QC/QA: YES
CTP QC/QA: YES
POC MOLECULAR INFLUENZA A AGN: NEGATIVE
POC MOLECULAR INFLUENZA B AGN: NEGATIVE
SARS-COV-2 RDRP RESP QL NAA+PROBE: NEGATIVE

## 2025-02-20 NOTE — PROGRESS NOTES
Patient ID: Princess Colindres  : 1995    Chief Complaint: Sore Throat, Cough, Fever, and Headache    Allergies: Patient is allergic to codeine.     Subjective :  The patient is a 29 y.o.    Black or   White female who presents to clinic for follow up on Sore Throat, Cough, Fever, and Headache   History of Present Illness    HPI:  Patient reports feeling unwell for the past 3 days, with onset on Tuesday. Symptoms include headache, nausea, fever, body aches, and chest pain (described as chest discomfort). She had a fever of 100°F the night before the visit. She also reports coughing and sinus pressure. She expresses significant discomfort due to these symptoms.    She has been exposed to illness recently, as her son had pneumonia and her mother had the flu. She has not gone to work today due to the illness, but it is unclear if she worked earlier in the week. 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    She denies vomiting and diarrhea.    FAMILY HISTORY:  Family history is significant for the patient's son with pneumonia and her mother with flu.    TEST RESULTS:  Patient recently underwent flu and COVID tests, both of which returned negative results.      ROS:  ROS as indicated in HPI.           Social History:  reports that she has never smoked. She has never been exposed to tobacco smoke. She has never used smokeless tobacco. She reports that she does not currently use alcohol. She reports that she does not use drugs.    Past Medical History:  has a past medical history of Acute angle-closure glaucoma, Asthma, Cervical radiculopathy, Family history of diabetes mellitus, Fibromyalgia, BRITNEY (generalized anxiety disorder), Hypertension, Insomnia, Low TSH level, Migraine headache, Ovarian cyst, and PCOS (polycystic ovarian syndrome).    Current Medications:  Current Outpatient Medications   Medication Instructions    albuterol (PROVENTIL/VENTOLIN HFA) 90 mcg/actuation inhaler 2 puffs     "ALPRAZolam (XANAX) 0.5 mg, Oral, 2 times daily PRN    CARAFATE 1 g, 3 times daily    cholecalciferol (vitamin D3) 50,000 Units, Oral, Every 7 days    citalopram (CELEXA) 40 mg, Oral, Daily    cloNIDine (CATAPRES) 0.3 mg, Oral, Nightly    cyclobenzaprine (FLEXERIL) 10 mg, Oral, 2 times daily PRN    FLONASE ALLERGY RELIEF 50 mcg/actuation nasal spray 1 spray, 2 times daily    gabapentin (NEURONTIN) 300 mg, Oral, 3 times daily    hydroCHLOROthiazide 12.5 mg, Daily    ibuprofen 20 mg/mL oral liquid SMARTSI Milliliter(s) By Mouth Every 6 Hours PRN    lisinopriL (PRINIVIL,ZESTRIL) 30 mg, Oral, Daily    naproxen sodium (ANAPROX) 220 mg, Daily PRN    ondansetron (ZOFRAN-ODT) 4 MG TbDL DISSOLVE 1 TABLET IN MOUTH TWICE DAILY    PEPCID 20 mg, 3 times daily    PEPTO-BISMOL 262 mg Tab 1 tablet, 3 times daily    REXULTI 0.5 mg, Oral, Daily    REXULTI 0.5 mg, Oral, Daily    REXULTI 0.5 mg, Oral, Daily    UBRELVY 100 mg, Oral, Daily PRN    zolpidem (AMBIEN) 10 mg, Oral, Nightly PRN    ZyrTEC 10 mg         Visit Vitals  /86 (Patient Position: Sitting)   Pulse 95   Temp 98.9 °F (37.2 °C)   Ht 5' 2" (1.575 m)   Wt 128 kg (282 lb 3.2 oz)   SpO2 98%   BMI 51.62 kg/m²       Physical Exam  Vitals reviewed.   Constitutional:       Appearance: Normal appearance.   Cardiovascular:      Heart sounds: Normal heart sounds.   Pulmonary:      Breath sounds: Normal breath sounds.   Skin:     General: Skin is warm and dry.   Neurological:      Mental Status: She is oriented to person, place, and time.                Assessment & Plan:  1. Cough, unspecified type  -     POCT COVID-19 Rapid Screening  -     POCT Influenza A/B Molecular         Assessment & Plan    R05.9 Cough, unspecified type    IMPRESSION:   Patient presenting with viral illness symptoms for 3 days   Negative results for flu, COVID, and strep tests   Considering unusual viral infection prevalent in current cold/flu season   Monitoring for potential development of " pneumonia in later stages of illness    R05.9 COUGH, UNSPECIFIED TYPE:   Educated the patient about the current unusual cold and flu season with atypical viral infections.   Explained the possibility of prolonged fever (up to 7 days) and potential for pneumonia development in later stages of illness (days 10-13).   Reviewed symptoms to monitor for worsening condition, emphasizing the importance of watching for symptoms moving from head to chest and being aware of potential symptom recurrence or worsening around days 10-13.           No follow-ups on file. Call sooner if needed.    GAYATRI Burrows    Lab Frequency Next Occurrence   Prolactin Once 01/03/2024   Ambulatory referral/consult to Sleep Disorders Once 02/06/2024   Ambulatory referral/consult to General Surgery Once 05/16/2024            This note was generated with the assistance of ambient listening technology. Verbal consent was obtained by the patient and accompanying visitor(s) for the recording of patient appointment to facilitate this note. I attest to having reviewed and edited the generated note for accuracy, though some syntax or spelling errors may persist. Please contact the author of this note for any clarification.

## 2025-02-20 NOTE — LETTER
February 20, 2025      [facility]  [address], [place] [address]  Phone: [phone]  Fax: [phone]       Patient: Princess Colindres   YOB: 1995  Date of Visit: 02/20/2025    To Whom It May Concern:    Jase Colindres  was at [facility] on 02/20/2025. The patient may return to work on Monday, 02/24/2025 with no restrictions. If you have any questions or concerns, or if I can be of further assistance, please do not hesitate to contact me.      Sincerely,        Florencia Em, LU-C

## 2025-02-25 DIAGNOSIS — G47.00 INSOMNIA, UNSPECIFIED TYPE: ICD-10-CM

## 2025-02-26 ENCOUNTER — TELEPHONE (OUTPATIENT)
Dept: FAMILY MEDICINE | Facility: CLINIC | Age: 30
End: 2025-02-26
Payer: COMMERCIAL

## 2025-02-27 RX ORDER — ZOLPIDEM TARTRATE 10 MG/1
10 TABLET ORAL NIGHTLY PRN
Qty: 30 TABLET | Refills: 2 | Status: SHIPPED | OUTPATIENT
Start: 2025-02-27

## 2025-05-01 ENCOUNTER — TELEPHONE (OUTPATIENT)
Dept: NEUROLOGY | Facility: CLINIC | Age: 30
End: 2025-05-01
Payer: COMMERCIAL

## 2025-05-01 DIAGNOSIS — G43.711 CHRONIC MIGRAINE WITHOUT AURA, INTRACTABLE, WITH STATUS MIGRAINOSUS: Primary | ICD-10-CM

## 2025-05-01 RX ORDER — DIVALPROEX SODIUM 500 MG/1
500 TABLET, FILM COATED, EXTENDED RELEASE ORAL DAILY
Qty: 5 TABLET | Refills: 0 | Status: SHIPPED | OUTPATIENT
Start: 2025-05-01 | End: 2025-05-06

## 2025-05-01 RX ORDER — DEXAMETHASONE 4 MG/1
4 TABLET ORAL EVERY 12 HOURS
Qty: 10 TABLET | Refills: 0 | Status: SHIPPED | OUTPATIENT
Start: 2025-05-01 | End: 2025-05-06

## 2025-05-01 NOTE — TELEPHONE ENCOUNTER
----- Message from Nurse Anabelle sent at 5/1/2025 12:53 PM CDT -----  Regarding: appointment  Patient next Botox is not until July. Having to take Ubrelvy multiple times a day due to migraines. Wants to know what could be done until next appointment. Please callback patient at [phone].

## 2025-05-02 ENCOUNTER — PATIENT MESSAGE (OUTPATIENT)
Dept: NEUROLOGY | Facility: CLINIC | Age: 30
End: 2025-05-02
Payer: COMMERCIAL

## 2025-05-09 NOTE — PROGRESS NOTES
[facility] Neurology Follow Up Office Visit Note    Initial Visit Date: 5/25/2023  Last Visit Date: 12/12/2023  Current Visit Date:  05/13/2025    Chief Complaint:     Chief Complaint   Patient presents with    Migraine     Pt reports increase in migraines, approx. 4-5 per week. She states they are lasting longer and increase in difficulty to abort.       History of Present Illness:      This is 29 y.o. female with history of morbid obesity, Chiari malformation type 1, anxiety, PCOS, insomnia, hypertension who is referred for chronic migraine without aura with bruxism.  Patient last had Botox on 11/2024. Headache frequency worse this past 1 month.     Age of Onset : 10 years old    Headache Description:   occipital, paracervical, stabbing, throbbing, pressure sensation, moderate to severe, lasting >24 hours to 7 days, with nausea, with photophobia and phonophobia.   Left cheek, lower jaw, gum numbness and occasional periorbital throbbing pain.  Also reports jaw clicking sensation at times.    Baseline Headache Frequency: 16-20  migraine headache days per month for the past 3 years.   Botox Interval Headache Frequency: 2 migraine headache days per month for the past 3 years.     Provocation Factors: denied    Risk Factors  - Family history of headache disorder: Yes mom and sister with headaches. Maternal grandfather with headache disorder.   - History of focal CNS lesions: No  - History of CNS infections: No  - History head trauma: No  - History of underlying mood disorder: Yes mood swings.   - History of sleep disorder: Yes not well.  Severe bruxism.  Tends to by through mouth guards when she was young.  Family history of bruxism.   - Recreational drug use: No  - Tobacco use: No  - Alcohol use: No  - Weight fluctuation: Yes morbid obesity.  - Isotretinoin or Tetracycline use:  No  - Family planning and contraceptive use: No    Medications:     Current Prophylactic  Lisinopril 30 mg daily  Celexa 40 mg daily  Gabapentin  300 mg 3 times a day (12/20/2022 to present): ineffective     Current Abortive  Ubrogepant 100 mg twice a day as needed (12/12/2022 to present): effective.   Cyclobenzaprine   Tylenol PRN: one day per week  Advil PRN: one day      Prior Prophylactic  Acetazolamide  Topiramate (1/2022): nausea   Amitriptyline (2/2022): worsening depression   Emgality 120 mg once per month: ineffective  Erenumab 70 mg once per month (12/12/2022 - 6/2023) - ineffective  Depakote 500 mg daily (5/1/2025): ineffective     Prior Abortive  Aleve PRN: daily for the past few weeks     Devices:     - VNS:  - TNS  - TMS:     Procedures:     - Botox #6 11/20/2024  - PSG block:   - Occipital nerve block:     Labs:     Results for orders placed or performed in visit on 02/20/25   POCT COVID-19 Rapid Screening    Collection Time: 02/20/25 12:06 PM   Result Value Ref Range    POC Rapid COVID Negative Negative     Acceptable Yes    POCT Influenza A/B Molecular    Collection Time: 02/20/25 12:06 PM   Result Value Ref Range    POC Molecular Influenza A Ag Negative Negative    POC Molecular Influenza B Ag Negative Negative     Acceptable Yes        Studies:     - MRI Brain CSF flow 2/22/2023:  I have reviewed the study independently and with the patient.  Patent flow.  - MRI Brain without contrast 12/6/2022:  I have reviewed the study independently and with the patient. 4 mm tonsillar ectopia.  No crowding.  - MRA Head w/o Juan Luis:   - MRV Head w/o Juan Luis:   - NCHCT:  - Lumbar Puncture:    Review of Systems:     Review of Systems   All other systems reviewed and are negative.      Physical Exams:     Vitals:    05/13/25 0952   BP: 129/89   Pulse: 103         Physical Exam  Vitals and nursing note reviewed.   Constitutional:       Appearance: Normal appearance.   HENT:      Head: Normocephalic and atraumatic.      Comments: Bilateral masseter tenderness to palpation.  Mild TMJ subluxation on the left.     Nose: Nose normal.       Mouth/Throat:      Mouth: Mucous membranes are moist.      Pharynx: Oropharynx is clear.   Eyes:      Conjunctiva/sclera: Conjunctivae normal.   Cardiovascular:      Rate and Rhythm: Normal rate and regular rhythm.      Pulses: Normal pulses.   Pulmonary:      Effort: Pulmonary effort is normal.      Breath sounds: Normal breath sounds.   Abdominal:      General: Abdomen is flat.   Musculoskeletal:         General: Normal range of motion.      Cervical back: Normal range of motion.   Skin:     General: Skin is warm.   Neurological:      Mental Status: She is alert.         Comprehensive Neurological Exam:  Mental Status: Alert Oriented to Self, Date, and Place. Comprehension wnl. No dysarthria.   CN II - XII: GOMEZ, No APD, Fundus wnl OU, VFFC, No ptosis OU, EOMI without nystagmus LT/Temp symmetric in CN V1-3 distribution, Hearing grossly intact, Face Symmetric, Tongue and Uvula midline, Trapezius symmetric bilateral.   Motor: tone and bulk wnl throughout, no abnormal involuntary or voluntary movements, 5/5 to confrontation, Fine finger movements wnl b/l, No pronator drift.   Sensory: LT, Proprioception, Vibration, PP, Temp symmetric.   Reflexes: 2+ throughout, plantar reflexes downward bilateral.   Cerebellar: FNF wnl b/l, RAHM wnl b/l  Romberg: Negative  Gait: normal.     Assessment:     This is 29 y.o. female with history of morbid obesity, Chiari malformation type 1, anxiety, PCOS, insomnia, hypertension who is referred for chronic migraine without aura with bruxism and left stylohyaloid tendonitis.    Problem List Items Addressed This Visit          Neuro    Chronic migraine without aura, intractable, with status migrainosus - Primary    Relevant Medications    galcanezumab-gnlm (EMGALITY PEN) 120 mg/mL PnIj         Plan:     [] increase Gabapentin to 400 mg 3 times a day   [] continue with Ubrogepant 100 mg twice a day as needed   [] start Emgality 120 mg once per month     [] Migraine Botox Protocol:  A.  : Botox dosage: 10 units in 2 sites Right: 5 Left: 5   B. Procerus Botox dosage: 5 Units in 1 site   C. Frontalis Botox dosage: 20 Units divided in 4 sites Right: 10 Left: 10   D. Temporalis Botox dosage: 40 Units divided in 8 sites Right: 20 Left: 20   E. Occipitalis Botox dosage 30 Units divided in 6 sites Right: 15 Left: 15   F. Cervical Paraspinal Botox dosage: 20 Units divided in 4 sites: Right 10 Left: 10   G. Trapezius Botox dosage: 30 Units divided in 6 sites: Right: 15 Left: 15     Total Units Injected: 155 units   Total Units discarded: 45 units       RTC Botox    Headache education provided: good sleep hygiene and 7 hours of sleep per night, stress management, medication overuse education provided. Using more 3 OTC per week may worsen headaches, high intensity interval training has shown to reduce headache frequency. Low carb, high protein has shown to reduce headache frequency. Patient is instructed in keep headache diary.     I have explained the treatment plan, diagnosis, and prognosis to patient. All questions are answered to the best of my knowledge.     Face to face time 40 minutes, including documentation, chart review, counseling, education, review of test results, relevant medical records, and coordination of care.       Mary Fried MD   General Neurology  05/13/2025

## 2025-05-13 ENCOUNTER — OFFICE VISIT (OUTPATIENT)
Dept: NEUROLOGY | Facility: CLINIC | Age: 30
End: 2025-05-13
Payer: COMMERCIAL

## 2025-05-13 VITALS
DIASTOLIC BLOOD PRESSURE: 89 MMHG | WEIGHT: 289 LBS | BODY MASS INDEX: 53.18 KG/M2 | OXYGEN SATURATION: 98 % | HEIGHT: 62 IN | HEART RATE: 103 BPM | SYSTOLIC BLOOD PRESSURE: 129 MMHG

## 2025-05-13 DIAGNOSIS — G43.711 CHRONIC MIGRAINE WITHOUT AURA, INTRACTABLE, WITH STATUS MIGRAINOSUS: Primary | ICD-10-CM

## 2025-05-13 DIAGNOSIS — G43.909 MIGRAINE WITHOUT STATUS MIGRAINOSUS, NOT INTRACTABLE, UNSPECIFIED MIGRAINE TYPE: ICD-10-CM

## 2025-05-13 DIAGNOSIS — M54.50 CHRONIC BILATERAL LOW BACK PAIN WITHOUT SCIATICA: ICD-10-CM

## 2025-05-13 DIAGNOSIS — G89.29 CHRONIC BILATERAL LOW BACK PAIN WITHOUT SCIATICA: ICD-10-CM

## 2025-05-13 PROCEDURE — 1159F MED LIST DOCD IN RCRD: CPT | Mod: CPTII,,, | Performed by: PSYCHIATRY & NEUROLOGY

## 2025-05-13 PROCEDURE — 3074F SYST BP LT 130 MM HG: CPT | Mod: CPTII,,, | Performed by: PSYCHIATRY & NEUROLOGY

## 2025-05-13 PROCEDURE — 4010F ACE/ARB THERAPY RXD/TAKEN: CPT | Mod: CPTII,,, | Performed by: PSYCHIATRY & NEUROLOGY

## 2025-05-13 PROCEDURE — 3079F DIAST BP 80-89 MM HG: CPT | Mod: CPTII,,, | Performed by: PSYCHIATRY & NEUROLOGY

## 2025-05-13 PROCEDURE — G2211 COMPLEX E/M VISIT ADD ON: HCPCS | Mod: S$PBB,,, | Performed by: PSYCHIATRY & NEUROLOGY

## 2025-05-13 PROCEDURE — 3008F BODY MASS INDEX DOCD: CPT | Mod: CPTII,,, | Performed by: PSYCHIATRY & NEUROLOGY

## 2025-05-13 PROCEDURE — 99214 OFFICE O/P EST MOD 30 MIN: CPT | Mod: PBBFAC | Performed by: PSYCHIATRY & NEUROLOGY

## 2025-05-13 PROCEDURE — 99215 OFFICE O/P EST HI 40 MIN: CPT | Mod: S$PBB,,, | Performed by: PSYCHIATRY & NEUROLOGY

## 2025-05-13 RX ORDER — GALCANEZUMAB 120 MG/ML
120 INJECTION, SOLUTION SUBCUTANEOUS
Qty: 1 ML | Refills: 3 | Status: SHIPPED | OUTPATIENT
Start: 2025-05-13 | End: 2025-09-10

## 2025-05-13 RX ORDER — GABAPENTIN 400 MG/1
400 CAPSULE ORAL 3 TIMES DAILY
Qty: 270 CAPSULE | Refills: 1 | Status: SHIPPED | OUTPATIENT
Start: 2025-05-13 | End: 2025-11-09

## 2025-05-13 RX ORDER — UBROGEPANT 100 MG/1
100 TABLET ORAL DAILY PRN
Qty: 16 TABLET | Refills: 5 | Status: SHIPPED | OUTPATIENT
Start: 2025-05-13

## 2025-05-28 DIAGNOSIS — I10 PRIMARY HYPERTENSION: Chronic | ICD-10-CM

## 2025-05-28 RX ORDER — CLONIDINE HYDROCHLORIDE 0.3 MG/1
0.3 TABLET ORAL NIGHTLY
Qty: 30 TABLET | Refills: 1 | Status: SHIPPED | OUTPATIENT
Start: 2025-05-28

## 2025-06-18 ENCOUNTER — TELEPHONE (OUTPATIENT)
Dept: NEUROLOGY | Facility: CLINIC | Age: 30
End: 2025-06-18
Payer: COMMERCIAL

## 2025-06-18 NOTE — TELEPHONE ENCOUNTER
----- Message from Debo sent at 6/18/2025 10:57 AM CDT -----  Regarding: Insurance question  Pt called Clinic and wants to know what was her old Insurance? Pt can be reached at [phone]    Thank You

## 2025-06-19 ENCOUNTER — PROCEDURE VISIT (OUTPATIENT)
Dept: NEUROLOGY | Facility: CLINIC | Age: 30
End: 2025-06-19
Payer: COMMERCIAL

## 2025-06-19 VITALS
BODY MASS INDEX: 53 KG/M2 | OXYGEN SATURATION: 99 % | WEIGHT: 288 LBS | HEIGHT: 62 IN | DIASTOLIC BLOOD PRESSURE: 89 MMHG | RESPIRATION RATE: 16 BRPM | SYSTOLIC BLOOD PRESSURE: 133 MMHG | TEMPERATURE: 98 F | HEART RATE: 77 BPM

## 2025-06-19 DIAGNOSIS — G24.4 OROMANDIBULAR DYSTONIA: ICD-10-CM

## 2025-06-19 DIAGNOSIS — G43.711 CHRONIC MIGRAINE WITHOUT AURA, INTRACTABLE, WITH STATUS MIGRAINOSUS: Primary | ICD-10-CM

## 2025-06-19 PROCEDURE — 64615 CHEMODENERV MUSC MIGRAINE: CPT | Mod: PBBFAC | Performed by: PSYCHIATRY & NEUROLOGY

## 2025-06-19 PROCEDURE — 64612 DESTROY NERVE FACE MUSCLE: CPT | Mod: PBBFAC | Performed by: PSYCHIATRY & NEUROLOGY

## 2025-06-19 RX ORDER — BREXPIPRAZOLE 1 MG/1
1 TABLET ORAL
COMMUNITY
Start: 2025-06-11

## 2025-06-19 NOTE — PROCEDURES
[facility] Neurology Outpatient Botox Procedure Note    History of Present Illness     This is 29 y.o. female with history of morbid obesity, Chiari malformation type 1, anxiety, PCOS, insomnia, hypertension who is referred for chronic migraine without aura with bruxism.  Patient is here for Botox 8.     Age of Onset : 10 years old     Headache Description:   occipital, paracervical, stabbing, throbbing, pressure sensation, moderate to severe, lasting >24 hours to 7 days, with nausea, with photophobia and phonophobia.   Left cheek, lower jaw, gum numbness and occasional periorbital throbbing pain.  Also reports jaw clicking sensation at times.     Baseline Headache Frequency: 15 migraine headache days per month for the past 3 years.   Interval Headache Frequency:  8 migraine headache days per month     Current Prophylactic  Emgality 120 mg once per month (5/13/2025 to present)  Lisinopril 30 mg daily  Celexa 40 mg daily  Gabapentin 400 mg 3 times a day (5/13/2025 to present)     Current Abortive  Ubrogepant 100 mg twice a day as needed (12/12/2022 to present): effective.   Cyclobenzaprine     Review of System      reviewed. stable.    Focused Exam     Reviewed.  Stable.    Assessment     This is 29 y.o. female with history of morbid obesity, Chiari malformation type 1, anxiety, PCOS, insomnia, hypertension who is referred for chronic migraine without aura with bruxism.  Patient is here for Botox 7.     Procedure     Date of procedure: 6/19/2024    Procedure: Chronic Migraine Chemodenervation with Botulinum toxin, chemodenervation of bilateral facial muscles     The patient was identified and informed consent was reviewed with the patient, and we discussed the risks, benefits and alternatives. Specifically, we discussed the risks of bleeding, infection and nerve injury with worsened pain and function. Specifically, we discussed the most frequently reported adverse reactions following injection of BOTOX include headache  (5%), eyelid ptosis (4%), muscular weakness (4%), bronchitis (3%), injection-site pain (3%), musculoskeletal pain (3%), myalgia (3%), facial paresis (2%), hypertension (2%), and muscle spasms (2%). The patient verbalized an understanding of these risks and the symptoms and the potentially catastrophic consequences of this occurrence. The patient verbalized an understanding that if she should begin to have these symptoms that she should immediately go to the nearest emergency room for evaluation. The patient was then positioned. The injection sites were identified and was prepped with Alcohol. 4cc of preservative free normal saline was mixed with 200 units of Botox. A 30-gauge, 0.5 inch needle was then used to inject a total 155 units of Botox. Muscles injected as below. Patient tolerated the procedure well with no complaints.    A. : Botox dosage: 10 units in 2 sites Right: 5 Left: 5   B. Procerus Botox dosage: 5 Units in 1 site   C. Frontalis Botox dosage: 20 Units divided in 4 sites Right: 10 Le ft: 10   D. Temporalis Botox dosage: 40 Units divided in 8 sites Right: 20 Left: 20   E. Occipitalis Botox dosage 30 Units divided in 6 sites Right: 15 Left: 15   F. Cervical Paraspinal Botox dosage: 20 Units divided in 4 sites: Right 10 Left: 10   G. Trapezius Botox dosage: 30 Units divided in 6 sites: Right: 15 Left: 15   H. Masseter Botox dosage: 10 Units divided in 2 sites: Right: 5 Left: 5    Total Units Injected: 165 units   Total Units discarded: 35 units     Follow Up       RTC Botox      Mary Fried MD   [facility] General Neurology

## 2025-07-22 DIAGNOSIS — I10 PRIMARY HYPERTENSION: Chronic | ICD-10-CM

## 2025-07-22 DIAGNOSIS — Z00.01 ANNUAL VISIT FOR GENERAL ADULT MEDICAL EXAMINATION WITH ABNORMAL FINDINGS: Primary | ICD-10-CM

## 2025-07-22 RX ORDER — CLONIDINE HYDROCHLORIDE 0.3 MG/1
0.3 TABLET ORAL NIGHTLY
Qty: 30 TABLET | Refills: 0 | Status: SHIPPED | OUTPATIENT
Start: 2025-07-22

## 2025-07-22 NOTE — TELEPHONE ENCOUNTER
On 5/28/25, I sent her a message letting her know that she would need to schedule a wellness with labs prior. She read the message on 6/24/25. On 7/20/25, she scheduled an appt for 8/7/25 for medication refill. I had Ke call her to schedule the labs and change the appt to Wellness. Please advise on the refill. Last labs were 5/7/24

## 2025-08-07 ENCOUNTER — OFFICE VISIT (OUTPATIENT)
Dept: FAMILY MEDICINE | Facility: CLINIC | Age: 30
End: 2025-08-07
Payer: COMMERCIAL

## 2025-08-07 VITALS
WEIGHT: 293 LBS | DIASTOLIC BLOOD PRESSURE: 80 MMHG | BODY MASS INDEX: 53.92 KG/M2 | SYSTOLIC BLOOD PRESSURE: 136 MMHG | HEIGHT: 62 IN | HEART RATE: 78 BPM | OXYGEN SATURATION: 98 % | TEMPERATURE: 97 F

## 2025-08-07 DIAGNOSIS — G89.29 CHRONIC BILATERAL LOW BACK PAIN WITHOUT SCIATICA: ICD-10-CM

## 2025-08-07 DIAGNOSIS — E78.00 HIGH CHOLESTEROL: ICD-10-CM

## 2025-08-07 DIAGNOSIS — M54.50 CHRONIC BILATERAL LOW BACK PAIN WITHOUT SCIATICA: ICD-10-CM

## 2025-08-07 DIAGNOSIS — I10 PRIMARY HYPERTENSION: Chronic | ICD-10-CM

## 2025-08-07 DIAGNOSIS — F33.42 RECURRENT MAJOR DEPRESSIVE DISORDER, IN FULL REMISSION: ICD-10-CM

## 2025-08-07 DIAGNOSIS — E55.9 VITAMIN D DEFICIENCY: ICD-10-CM

## 2025-08-07 DIAGNOSIS — G93.5 ARNOLD-CHIARI MALFORMATION, TYPE I: ICD-10-CM

## 2025-08-07 DIAGNOSIS — J45.20 MILD INTERMITTENT ASTHMA WITHOUT COMPLICATION: ICD-10-CM

## 2025-08-07 DIAGNOSIS — K21.9 GASTROESOPHAGEAL REFLUX DISEASE WITHOUT ESOPHAGITIS: ICD-10-CM

## 2025-08-07 DIAGNOSIS — G43.709 CHRONIC MIGRAINE WITHOUT AURA WITHOUT STATUS MIGRAINOSUS, NOT INTRACTABLE: ICD-10-CM

## 2025-08-07 DIAGNOSIS — E66.813 CLASS 3 SEVERE OBESITY DUE TO EXCESS CALORIES WITH SERIOUS COMORBIDITY AND BODY MASS INDEX (BMI) OF 50.0 TO 59.9 IN ADULT: ICD-10-CM

## 2025-08-07 DIAGNOSIS — Z00.01 ENCOUNTER FOR WELL ADULT EXAM WITH ABNORMAL FINDINGS: Primary | ICD-10-CM

## 2025-08-07 DIAGNOSIS — F51.01 PRIMARY INSOMNIA: ICD-10-CM

## 2025-08-07 RX ORDER — ASPIRIN 325 MG
50000 TABLET, DELAYED RELEASE (ENTERIC COATED) ORAL
Qty: 12 CAPSULE | Refills: 1 | Status: SHIPPED | OUTPATIENT
Start: 2025-08-07

## 2025-08-07 RX ORDER — BREXPIPRAZOLE 0.5 MG/1
1 TABLET ORAL DAILY
COMMUNITY
Start: 2025-07-24

## 2025-08-07 NOTE — PROGRESS NOTES
Patient ID: Princess Colindres  : 1995    Chief Complaint: Annual Exam    Allergies: Patient is allergic to codeine.     History of Present Illness:  The patient is a 29 y.o.    Black or   White female who presents to clinic for annual wellness visit.      Feeling well in general. However, she is having issues with headaches again.  She has been on multiple migraine headaches in the past, eventually a an MRI was done and revealed a small Chiari malformation type 1.  Eventually she was referred to Neurology and has been fairly stable on Emgality, Botox injections and Ubrelvy for breakthrough migraines.  On her last visit with Dr. Fried in [place] she reports that the neurologist expressed an interest in maybe doing a repeat MRI as she is having some recurrence of her headaches.  At this point she is having migraines occurring about 4 times weekly.  She did have 1 particularly bad when last week that lasted for 32 hours.  They are her typical migraine pattern, associated with nausea occasional vomiting, photophobia phonophobia.      Past Medical History:  has a past medical history of Acute angle-closure glaucoma, Asthma, Cervical radiculopathy, Family history of diabetes mellitus, Fibromyalgia, BRITNEY (generalized anxiety disorder), Hypertension, Insomnia, Low TSH level, Migraine headache, Ovarian cyst, and PCOS (polycystic ovarian syndrome).    Surgical History:  has a past surgical history that includes  section (2018); Laparoscopic cholecystectomy with cholangiography (N/A, 2024); and Cholecystectomy (2024).    Family History: family history includes Melanoma in her maternal grandmother.    Social History:  reports that she has never smoked. She has never been exposed to tobacco smoke. She has never used smokeless tobacco. She reports that she does not currently use alcohol. She reports that she does not use drugs.    Care Team: Patient Care Team:  Florencia Em,  FNP-C as PCP - General (Family Medicine)  Emily Leon MD as Consulting Physician (Obstetrics and Gynecology)  Richard Spencer MD as Consulting Physician (General Surgery)  Elsa Schmidt ANP as Nurse Practitioner (Neurology)  Mary Fried MD as Consulting Physician (Neurology)     Current Medications:  Current Outpatient Medications   Medication Instructions    albuterol (PROVENTIL/VENTOLIN HFA) 90 mcg/actuation inhaler 2 puffs    cholecalciferol (vitamin D3) 50,000 Units, Oral, Every 7 days    citalopram (CELEXA) 40 mg, Oral, Daily    cloNIDine (CATAPRES) 0.3 mg, Oral, Nightly    cyclobenzaprine (FLEXERIL) 10 mg, Oral, 2 times daily PRN    EMGALITY  mg, Subcutaneous, Every 28 days    FLONASE ALLERGY RELIEF 50 mcg/actuation nasal spray 1 spray, 2 times daily    gabapentin (NEURONTIN) 400 mg, Oral, 3 times daily    lisinopriL (PRINIVIL,ZESTRIL) 30 mg, Oral, Daily    ondansetron (ZOFRAN-ODT) 4 MG TbDL DISSOLVE 1 TABLET IN MOUTH TWICE DAILY    REXULTI 0.5 mg Tab 1 tablet, Daily    REXULTI 1 mg Tab 1 tablet    UBRELVY 100 mg, Oral, Daily PRN    zolpidem (AMBIEN) 10 mg, Oral, Nightly PRN       Review of Systems   Constitutional:  Negative for activity change, appetite change, fatigue and unexpected weight change.   HENT:  Negative for ear pain and hearing loss.    Eyes:  Negative for visual disturbance.   Respiratory:  Negative for apnea, cough, chest tightness, shortness of breath and wheezing.    Cardiovascular:  Negative for chest pain, palpitations, leg swelling and claudication.   Gastrointestinal:  Negative for abdominal pain, anal bleeding, blood in stool, change in bowel habit, nausea, vomiting and reflux.   Endocrine: Negative for cold intolerance, heat intolerance, polydipsia, polyphagia and polyuria.   Genitourinary:  Negative for dysuria, frequency, hematuria and urgency.   Musculoskeletal:  Negative for arthralgias.   Integumentary:  Negative for rash and mole/lesion.  "  Allergic/Immunologic: Negative for environmental allergies.   Neurological:  Negative for dizziness, headaches and memory loss.        Visit Vitals  /80 (BP Location: Left arm)   Pulse 78   Temp 97.2 °F (36.2 °C) (Temporal)   Ht 5' 2" (1.575 m)   Wt 132.9 kg (293 lb)   SpO2 98%   BMI 53.59 kg/m²       Physical Exam  Vitals reviewed.   Constitutional:       Appearance: Normal appearance. She is normal weight.   Eyes:      Conjunctiva/sclera: Conjunctivae normal.   Cardiovascular:      Rate and Rhythm: Normal rate and regular rhythm.      Pulses: Normal pulses.      Heart sounds: Normal heart sounds.   Pulmonary:      Effort: Pulmonary effort is normal.      Breath sounds: Normal breath sounds.   Abdominal:      General: Bowel sounds are normal.      Palpations: Abdomen is soft.   Musculoskeletal:      Cervical back: Neck supple.   Skin:     General: Skin is warm and dry.      Capillary Refill: Capillary refill takes less than 2 seconds.   Neurological:      General: No focal deficit present.      Mental Status: She is alert and oriented to person, place, and time.      Cranial Nerves: No cranial nerve deficit.      Sensory: No sensory deficit.      Motor: No weakness.      Coordination: Coordination normal.      Gait: Gait normal.   Psychiatric:         Mood and Affect: Mood normal.         Behavior: Behavior normal.          Labs Reviewed:  Chemistry:  Lab Results   Component Value Date     08/01/2025    K 4.2 08/01/2025    CHLORIDE 102 12/01/2022    BUN 8 08/01/2025    CREATININE 0.81 08/01/2025    EGFRNORACEVR 99 08/01/2025    GLUCOSE 86 12/01/2022    CALCIUM 9.7 08/01/2025    ALKPHOS 83 05/07/2024    ALBUMIN 4.2 08/01/2025    AST 18 08/01/2025    ALT 16 08/01/2025    GLMMOHCL90YM 25.2 (L) 08/01/2025    TSH 1.440 08/01/2025    ETCWYZ8SCUH 1.11 08/16/2024        Lab Results   Component Value Date    HGBA1C 5.5 08/01/2025        Hematology:  Lab Results   Component Value Date    WBC 7.7 08/01/2025    " RBC 5.48 (H) 08/01/2025    HGB 13.0 08/01/2025    HCT 39.8 08/01/2025    MCV 73 (L) 08/01/2025    MCH 23.7 (L) 08/01/2025    MCHC 32.7 08/01/2025    RDW 14.6 08/01/2025    PLT Note: 08/01/2025    MPV 12.7 (H) 05/07/2024    MONO 7 08/01/2025    MONO 0.5 08/01/2025    BASO 0.1 08/01/2025    EOSINOPHIL 2 08/01/2025    BASOPHIL 1 08/01/2025       Lipid Panel:  Lab Results   Component Value Date    CHOL 212 (H) 08/01/2025    HDL 52 08/01/2025    LDLDIRECT 105.6 (H) 05/07/2024    TRIG 84 08/01/2025        Assessment & Plan:  1. Encounter for well adult exam with abnormal findings  -     CBC Auto Differential; Future; Expected date: 08/08/2026  -     Comprehensive Metabolic Panel; Future; Expected date: 08/08/2026  -     Lipid Panel; Future; Expected date: 08/08/2026  -     TSH; Future; Expected date: 08/08/2026  -     Hemoglobin A1C; Future; Expected date: 08/08/2026  -     Vitamin D; Future; Expected date: 08/08/2026    2. Arnold-Chiari malformation, type I  Overview:  Identified on MRI Dec 2022    Assessment & Plan:  Recurrence of persistent migraines despite multiple medications for intervention.  This includes Botox injections regularly, monthly Emgality shots and Ubrelvy for breakthrough migraines.  We will obtain another MRI to reassess malformation.    Orders:  -     MRI Brain Without Contrast; Future; Expected date: 08/08/2025    3. Mild intermittent asthma without complication  Overview:  Ventolin PRN    Assessment & Plan:  Stable; rarely needs inhaler, continue current therapy.         4. Chronic bilateral low back pain without sciatica  Overview:  On Gabapentin 300 mg TID and Naproxen prn    Assessment & Plan:  Stable; continue current therapy.         5. Chronic migraine without aura without status migrainosus, not intractable  Overview:  Est with Neurology; Botox injections routinely, on Emgality monthly.  Ubrelvy prn    Assessment & Plan:  Current therapy no longer effective, we will get an MRI    Orders:  -      MRI Brain Without Contrast; Future; Expected date: 08/08/2025    6. Class 3 severe obesity due to excess calories with serious comorbidity and body mass index (BMI) of 50.0 to 59.9 in adult  Assessment & Plan:  Body mass index is 53.59 kg/m².  Goal BMI <30.  Exercise 5 times a week for 30 minutes per day.  Avoid soda, simple sugars, excessive rice, potatoes or bread. Limit fast foods and fried foods.  Choose complex carbs in moderation (example: green vegetables, beans, oatmeal). Eat plenty of fresh fruits and vegetables with lean meats daily.  Do not skip meals. Eat a balanced portion size.  Avoid fad diets. Consider permanent healthy life style changes.         7. Recurrent major depressive disorder, in full remission  Overview:  Est with Psych provider.   On Celexa 40 mg daily.   Vraylar 1.5 mg added (12/04/2023)  Vraylar increased to 3 mg (03/2024)  Vraylar discontinued and Rexulti started (08/16/24)    Assessment & Plan:  Stable; continue current therapy.         8. Primary hypertension  Overview:  On lisinopril 30 mg daily    Assessment & Plan:  Well controlled.   Continue current regimen.   Low Sodium Diet (DASH Diet - Less than 2 grams of sodium per day).  Monitor blood pressure daily and log. Report consistent numbers greater than 140/90.  Maintain healthy weight with goal BMI <30. Exercise 30 minutes per day, 5 days per week.          9. High cholesterol  Assessment & Plan:  Lab Results   Component Value Date    CHOL 212 (H) 08/01/2025    HDL 52 08/01/2025    LDLCALC 145 (H) 08/01/2025    LDLDIRECT 105.6 (H) 05/07/2024    TRIG 84 08/01/2025     Stable. Continue current therapy.  LDL Goal:   Educated on dietary modifications. Follow a low cholesterol, low saturated fat diet with less that 200mg of cholesterol a day.  Avoid fried foods and high saturated fats.  Increase dietary fiber.  Regular exercise can reduce LDL (bad cholesterol) and raise HDL (good cholesterol). Encourage physical activity 5 times  per week for 30 minutes per day.         10. Gastroesophageal reflux disease without esophagitis  Overview:  Takes Prilosec OTC    Assessment & Plan:  Take medication: As needed only.   Preventive Measures:   Avoid spicy, acidic, fried foods and alcohol.  Eat 2-3 hours before going to bed. Remain upright for 30-60 minutes after eating.   Avoid tight clothing, chew food thoroughly.  Reduce caffeine intake, avoid soda.        11. Vitamin D deficiency  Overview:  Vitamin D level 20.0 (01/2024). Started on Cholecalciferol 1250 mcg weekly.     Assessment & Plan:  Vitamin D level 25.2; low normal; advise to resume vitamin D3 supplementation weekly for 6 months.  When that is complete advised to take OTC supplementation for life.    Orders:  -     cholecalciferol, vitamin D3, 1,250 mcg (50,000 unit) capsule; Take 1 capsule (50,000 Units total) by mouth every 7 days.  Dispense: 12 capsule; Refill: 1    12. Primary insomnia  Overview:  Ambien 10 mg Qhs prn    Assessment & Plan:  Stable; continue current therapy.              Cervical Cancer Screening-UTD  Breast Cancer Screening-not due  Osteoporosis Screening-not due  Colon Cancer Screening -  not due  Eye Exam- Recommend annually.  Dental Exam- Recommend biannually.    Vaccinations:  Immunization History   Administered Date(s) Administered    COVID-19 Vaccine 07/29/2022    COVID-19, MRNA, LN-S, PF (MODERNA FULL 0.5 ML DOSE) 07/29/2022    COVID-19, vector-nr, rS-Ad26, PF ([place]) 08/10/2022    DTP 02/17/1994, 04/21/1994, 06/13/1994, 05/15/1995    DTaP 08/03/1999    HIB 02/17/1994, 04/21/1994, 06/13/1994, 05/15/1995    Hepatitis B, Pediatric/Adolescent 1995, 02/07/1994, 04/21/1994, 05/21/2012    Influenza 09/25/2018    Influenza - Quadrivalent - PF *Preferred* (6 months and older) 09/25/2018    Influenza - Trivalent - Fluarix, Flulaval, Fluzone, Afluria - PF 10/29/2012, 09/25/2018    MMR 12/20/1994, 08/03/1999    Meningococcal Conjugate (MCV4P) 05/21/2012    OPV  02/17/1994, 04/21/1994, 05/15/1995, 08/03/1999    Tdap 05/21/2012, 08/21/2018    Varicella 12/30/1995, 05/21/2012       Future Appointments   Date Time Provider Department Center   8/26/2025  8:00 AM Elsa Schmidt ANP [facility] NEURO [place]   9/18/2025  2:30 PM BOTOX, [facility] NEUROLOGY [facility] NEURO [place]    8/13/2026  8:00 AM LAB, [facility] LABORATORY DRAW STATION [facility] LUCY [place]   8/20/2026  2:30 PM Florencia Em, FNP-C [facility]       Follow up for 1 year, Wellness, Fasting Labs prior. Call sooner if needed.    Florencia Em, REBECAP-C

## 2025-08-08 PROBLEM — Z90.49 HISTORY OF CHOLECYSTECTOMY: Status: ACTIVE | Noted: 2023-12-18

## 2025-08-08 PROBLEM — G43.909 MIGRAINE WITHOUT STATUS MIGRAINOSUS, NOT INTRACTABLE: Status: RESOLVED | Noted: 2024-12-19 | Resolved: 2025-08-08

## 2025-08-08 NOTE — ASSESSMENT & PLAN NOTE
Recurrence of persistent migraines despite multiple medications for intervention.  This includes Botox injections regularly, monthly Emgality shots and Ubrelvy for breakthrough migraines.  We will obtain another MRI to reassess malformation.

## 2025-08-08 NOTE — ASSESSMENT & PLAN NOTE
Vitamin D level 25.2; low normal; advise to resume vitamin D3 supplementation weekly for 6 months.  When that is complete advised to take OTC supplementation for life.

## 2025-08-08 NOTE — ASSESSMENT & PLAN NOTE
Lab Results   Component Value Date    CHOL 212 (H) 08/01/2025    HDL 52 08/01/2025    LDLCALC 145 (H) 08/01/2025    LDLDIRECT 105.6 (H) 05/07/2024    TRIG 84 08/01/2025     Stable. Continue current therapy.  LDL Goal:   Educated on dietary modifications. Follow a low cholesterol, low saturated fat diet with less that 200mg of cholesterol a day.  Avoid fried foods and high saturated fats.  Increase dietary fiber.  Regular exercise can reduce LDL (bad cholesterol) and raise HDL (good cholesterol). Encourage physical activity 5 times per week for 30 minutes per day.

## 2025-08-08 NOTE — ASSESSMENT & PLAN NOTE

## 2025-08-16 DIAGNOSIS — I10 PRIMARY HYPERTENSION: Chronic | ICD-10-CM

## 2025-08-18 RX ORDER — CLONIDINE HYDROCHLORIDE 0.3 MG/1
0.3 TABLET ORAL NIGHTLY
Qty: 30 TABLET | Refills: 11 | Status: SHIPPED | OUTPATIENT
Start: 2025-08-18

## 2025-08-20 DIAGNOSIS — I10 HYPERTENSION, UNSPECIFIED TYPE: ICD-10-CM

## 2025-08-21 ENCOUNTER — TELEPHONE (OUTPATIENT)
Dept: NEUROLOGY | Facility: CLINIC | Age: 30
End: 2025-08-21
Payer: COMMERCIAL

## 2025-08-21 ENCOUNTER — HOSPITAL ENCOUNTER (OUTPATIENT)
Dept: RADIOLOGY | Facility: HOSPITAL | Age: 30
Discharge: HOME | End: 2025-08-21
Attending: NURSE PRACTITIONER
Payer: COMMERCIAL

## 2025-08-21 ENCOUNTER — RESULTS FOLLOW-UP (OUTPATIENT)
Dept: FAMILY MEDICINE | Facility: CLINIC | Age: 30
End: 2025-08-21
Payer: COMMERCIAL

## 2025-08-21 DIAGNOSIS — G43.709 CHRONIC MIGRAINE WITHOUT AURA WITHOUT STATUS MIGRAINOSUS, NOT INTRACTABLE: ICD-10-CM

## 2025-08-21 DIAGNOSIS — M79.7 FIBROMYALGIA: Primary | ICD-10-CM

## 2025-08-21 DIAGNOSIS — G93.5 ARNOLD-CHIARI MALFORMATION, TYPE I: ICD-10-CM

## 2025-08-21 DIAGNOSIS — M25.50 POLYARTHRALGIA: ICD-10-CM

## 2025-08-21 PROCEDURE — 70551 MRI BRAIN STEM W/O DYE: CPT | Mod: TC

## 2025-08-21 RX ORDER — LISINOPRIL 30 MG/1
30 TABLET ORAL
Qty: 30 TABLET | Refills: 11 | Status: SHIPPED | OUTPATIENT
Start: 2025-08-21

## 2025-08-26 ENCOUNTER — PATIENT MESSAGE (OUTPATIENT)
Dept: NEUROLOGY | Facility: CLINIC | Age: 30
End: 2025-08-26

## 2025-08-26 ENCOUNTER — OFFICE VISIT (OUTPATIENT)
Dept: NEUROLOGY | Facility: CLINIC | Age: 30
End: 2025-08-26
Payer: COMMERCIAL

## 2025-08-26 ENCOUNTER — LAB VISIT (OUTPATIENT)
Dept: LAB | Facility: HOSPITAL | Age: 30
End: 2025-08-26
Attending: NURSE PRACTITIONER
Payer: COMMERCIAL

## 2025-08-26 DIAGNOSIS — G43.711 CHRONIC MIGRAINE WITHOUT AURA, INTRACTABLE, WITH STATUS MIGRAINOSUS: Chronic | ICD-10-CM

## 2025-08-26 DIAGNOSIS — E66.813 CLASS 3 SEVERE OBESITY DUE TO EXCESS CALORIES WITH SERIOUS COMORBIDITY AND BODY MASS INDEX (BMI) OF 50.0 TO 59.9 IN ADULT: Primary | Chronic | ICD-10-CM

## 2025-08-26 DIAGNOSIS — G43.909 MIGRAINE WITHOUT STATUS MIGRAINOSUS, NOT INTRACTABLE, UNSPECIFIED MIGRAINE TYPE: ICD-10-CM

## 2025-08-26 PROCEDURE — 3044F HG A1C LEVEL LT 7.0%: CPT | Mod: CPTII,95,, | Performed by: NURSE PRACTITIONER

## 2025-08-26 PROCEDURE — 98005 SYNCH AUDIO-VIDEO EST LOW 20: CPT | Mod: 95,,, | Performed by: NURSE PRACTITIONER

## 2025-08-26 PROCEDURE — 1160F RVW MEDS BY RX/DR IN RCRD: CPT | Mod: CPTII,95,, | Performed by: NURSE PRACTITIONER

## 2025-08-26 PROCEDURE — 4010F ACE/ARB THERAPY RXD/TAKEN: CPT | Mod: CPTII,95,, | Performed by: NURSE PRACTITIONER

## 2025-08-26 PROCEDURE — 1159F MED LIST DOCD IN RCRD: CPT | Mod: CPTII,95,, | Performed by: NURSE PRACTITIONER

## 2025-08-26 RX ORDER — GABAPENTIN 400 MG/1
400 CAPSULE ORAL 3 TIMES DAILY
Qty: 270 CAPSULE | Refills: 1 | Status: SHIPPED | OUTPATIENT
Start: 2025-08-26 | End: 2026-02-22

## 2025-08-26 RX ORDER — UBROGEPANT 100 MG/1
100 TABLET ORAL DAILY PRN
Qty: 16 TABLET | Refills: 5 | Status: SHIPPED | OUTPATIENT
Start: 2025-08-26

## 2025-08-26 RX ORDER — GALCANEZUMAB 120 MG/ML
120 INJECTION, SOLUTION SUBCUTANEOUS
Qty: 1 ML | Refills: 3 | Status: SHIPPED | OUTPATIENT
Start: 2025-08-26 | End: 2025-12-24

## 2025-09-04 ENCOUNTER — PATIENT MESSAGE (OUTPATIENT)
Dept: FAMILY MEDICINE | Facility: CLINIC | Age: 30
End: 2025-09-04
Payer: COMMERCIAL

## (undated) DEVICE — CATH CHOLANGIOGRAM 13IN

## (undated) DEVICE — SYR 10CC LUER LOCK

## (undated) DEVICE — BAG SPEC RETRV ENDO 4X6IN DISP

## (undated) DEVICE — SYR LUER LOCK 20ML

## (undated) DEVICE — NDL INSUFFLATION VERRES 120MM

## (undated) DEVICE — HOLDER SCALPEL SURGICAL GOLD

## (undated) DEVICE — SUT GUT PL. 4-0 27 FS-2

## (undated) DEVICE — TROCAR KII SHLD BLDED 5X100MM

## (undated) DEVICE — DRAPE DEVON INSTRUMENT 10X16IN

## (undated) DEVICE — SUT 0 VICRYL / UR6 (J603)

## (undated) DEVICE — Device

## (undated) DEVICE — DRAPE C ARM 42 X 120 10/BX

## (undated) DEVICE — APPLIER CLIP EPIX UNIV 5X34

## (undated) DEVICE — SYS HYDRO-SURG IRR SMOKE EVAC

## (undated) DEVICE — SCISSOR 5MMX35CM DIRECT DRIVE

## (undated) DEVICE — SLEEVE KII ADV FIX 5X100MM

## (undated) DEVICE — ADHESIVE DERMABOND ADVANCED

## (undated) DEVICE — ELECTRODE MEGADYNE L-WIRE 33CM

## (undated) DEVICE — IRRIGATOR HYDRO-SURG PLUS 5MM

## (undated) DEVICE — DRAPE INCISE IOBAN 2 23X17IN

## (undated) DEVICE — SET TUB INSUFFLATION SUC 20L

## (undated) DEVICE — NDL PNEUMO INSUFFLATI 120MM

## (undated) DEVICE — DISSECTOR EPIX LAPA 5MMX35CM

## (undated) DEVICE — GLOVE PROTEXIS HYDROGEL SZ6.5

## (undated) DEVICE — NDL SAFETY 22G X 1.5 ECLIPSE

## (undated) DEVICE — FILTER LAPAROSCOPIC SMOKE EVAC